# Patient Record
Sex: MALE | Race: WHITE | NOT HISPANIC OR LATINO | Employment: UNEMPLOYED | URBAN - METROPOLITAN AREA
[De-identification: names, ages, dates, MRNs, and addresses within clinical notes are randomized per-mention and may not be internally consistent; named-entity substitution may affect disease eponyms.]

---

## 2021-06-15 ENCOUNTER — TRANSFERRED RECORDS (OUTPATIENT)
Dept: HEALTH INFORMATION MANAGEMENT | Facility: CLINIC | Age: 26
End: 2021-06-15

## 2021-06-21 ENCOUNTER — MEDICAL CORRESPONDENCE (OUTPATIENT)
Dept: HEALTH INFORMATION MANAGEMENT | Facility: CLINIC | Age: 26
End: 2021-06-21

## 2021-06-22 ENCOUNTER — TRANSCRIBE ORDERS (OUTPATIENT)
Dept: OTHER | Age: 26
End: 2021-06-22

## 2021-06-22 DIAGNOSIS — G35 MS (MULTIPLE SCLEROSIS) (H): Primary | ICD-10-CM

## 2021-07-08 NOTE — TELEPHONE ENCOUNTER
RECORDS RECEIVED FROM: External   Date of Appt: 7/29/21   NOTES (FOR ALL VISITS) STATUS DETAILS   OFFICE NOTE from referring provider Care Everywhere Dr Antony Bianchi @ [facility]:  6/15/21  5/18/21  2/18/21  8/14/20  2/14/20  2/14/19   OFFICE NOTE from other specialist Care Everywhere Dr Mary Kay Gonsalez @ [facility]:  3/2/21  12/29/20  3/30/20  11/18/19    Melvin El @ [facility]:  9/9/20  7/2/19  3/25/19    Neuropsych Eval @ [facility]:  1/6/20 11/4/19   DISCHARGE SUMMARY from hospital N/A    DISCHARGE REPORT from the ER N/A    OPERATIVE REPORT N/A    MEDICATION LIST Care Everywhere    IMAGING  (FOR ALL VISITS)     EMG N/A    EEG N/A    LUMBAR PUNCTURE N/A    PATTI SCAN N/A    ULTRASOUND (CAROTID BILAT) *VASCULAR* N/A    MRI (HEAD, NECK, SPINE) Received [facility]:  MRI Lumbar Spine 3/9/21  MRI Cervical Spine 11/9/20  MRI Thoracic Spine 11/9/20  MRI Brain 11/9/20   CT (HEAD, NECK, SPINE) Received [facility]:  CT Head 10/22/20      Action 7/8/21 MV 4.41pm   Action Taken Imaging request faxed to [facility] for:  MRI Lumbar Spine 3/9/21  MRI Cervical Spine 11/9/20  MRI Thoracic Spine 11/9/20  MRI Brain 11/9/20  CT Head 10/22/20     Action 7/19/21 MV 10.05am   Action Taken 2nd request faxed to [facility]     Action 7/26/21 MV 7.53am   Action Taken Images resolved in PACS

## 2021-07-29 ENCOUNTER — PRE VISIT (OUTPATIENT)
Dept: NEUROLOGY | Facility: CLINIC | Age: 26
End: 2021-07-29

## 2021-08-22 ENCOUNTER — HEALTH MAINTENANCE LETTER (OUTPATIENT)
Age: 26
End: 2021-08-22

## 2021-10-17 ENCOUNTER — HEALTH MAINTENANCE LETTER (OUTPATIENT)
Age: 26
End: 2021-10-17

## 2021-12-12 ENCOUNTER — HEALTH MAINTENANCE LETTER (OUTPATIENT)
Age: 26
End: 2021-12-12

## 2021-12-23 ENCOUNTER — TELEPHONE (OUTPATIENT)
Dept: NEUROLOGY | Facility: CLINIC | Age: 26
End: 2021-12-23

## 2021-12-23 ENCOUNTER — LAB (OUTPATIENT)
Dept: LAB | Facility: CLINIC | Age: 26
End: 2021-12-23
Payer: MEDICARE

## 2021-12-23 ENCOUNTER — OFFICE VISIT (OUTPATIENT)
Dept: NEUROLOGY | Facility: CLINIC | Age: 26
End: 2021-12-23
Attending: PSYCHIATRY & NEUROLOGY
Payer: MEDICARE

## 2021-12-23 VITALS
SYSTOLIC BLOOD PRESSURE: 117 MMHG | DIASTOLIC BLOOD PRESSURE: 70 MMHG | OXYGEN SATURATION: 96 % | RESPIRATION RATE: 17 BRPM | TEMPERATURE: 99.3 F | HEART RATE: 103 BPM

## 2021-12-23 DIAGNOSIS — G35 MS (MULTIPLE SCLEROSIS) (H): ICD-10-CM

## 2021-12-23 DIAGNOSIS — U07.1 COVID-19: ICD-10-CM

## 2021-12-23 DIAGNOSIS — E55.9 VITAMIN D DEFICIENCY: ICD-10-CM

## 2021-12-23 DIAGNOSIS — G35 MS (MULTIPLE SCLEROSIS) (H): Primary | ICD-10-CM

## 2021-12-23 DIAGNOSIS — R50.9 FEVER, UNSPECIFIED FEVER CAUSE: ICD-10-CM

## 2021-12-23 PROBLEM — M21.622 TAILOR'S BUNION OF LEFT FOOT: Status: ACTIVE | Noted: 2020-06-30

## 2021-12-23 PROBLEM — M79.672 LEFT FOOT PAIN: Status: ACTIVE | Noted: 2020-06-30

## 2021-12-23 PROBLEM — G47.00 INSOMNIA: Status: ACTIVE | Noted: 2019-01-24

## 2021-12-23 PROBLEM — E66.9 OBESITY WITH BODY MASS INDEX OF 30.0-39.9: Status: ACTIVE | Noted: 2019-06-17

## 2021-12-23 PROBLEM — F41.9 ANXIETY: Status: ACTIVE | Noted: 2019-01-24

## 2021-12-23 PROBLEM — R45.89 SUICIDAL BEHAVIOR: Status: ACTIVE | Noted: 2020-10-23

## 2021-12-23 PROBLEM — F31.60 BIPOLAR 1 DISORDER, MIXED (H): Status: ACTIVE | Noted: 2020-10-23

## 2021-12-23 LAB
ALBUMIN SERPL-MCNC: 4.2 G/DL (ref 3.4–5)
ALP SERPL-CCNC: 61 U/L (ref 40–150)
ALT SERPL W P-5'-P-CCNC: 19 U/L (ref 0–70)
AST SERPL W P-5'-P-CCNC: 20 U/L (ref 0–45)
BASOPHILS # BLD AUTO: 0 10E3/UL (ref 0–0.2)
BASOPHILS NFR BLD AUTO: 0 %
BILIRUB DIRECT SERPL-MCNC: 0.2 MG/DL (ref 0–0.2)
BILIRUB SERPL-MCNC: 0.4 MG/DL (ref 0.2–1.3)
DEPRECATED CALCIDIOL+CALCIFEROL SERPL-MC: 10 UG/L (ref 20–75)
EOSINOPHIL # BLD AUTO: 0 10E3/UL (ref 0–0.7)
EOSINOPHIL NFR BLD AUTO: 0 %
ERYTHROCYTE [DISTWIDTH] IN BLOOD BY AUTOMATED COUNT: 11.6 % (ref 10–15)
HCT VFR BLD AUTO: 46.3 % (ref 40–53)
HGB BLD-MCNC: 15.8 G/DL (ref 13.3–17.7)
IGG SERPL-MCNC: 816 MG/DL (ref 610–1616)
IMM GRANULOCYTES # BLD: 0 10E3/UL
IMM GRANULOCYTES NFR BLD: 0 %
LYMPHOCYTES # BLD AUTO: 1 10E3/UL (ref 0.8–5.3)
LYMPHOCYTES NFR BLD AUTO: 24 %
MCH RBC QN AUTO: 29.4 PG (ref 26.5–33)
MCHC RBC AUTO-ENTMCNC: 34.1 G/DL (ref 31.5–36.5)
MCV RBC AUTO: 86 FL (ref 78–100)
MONOCYTES # BLD AUTO: 0.7 10E3/UL (ref 0–1.3)
MONOCYTES NFR BLD AUTO: 16 %
NEUTROPHILS # BLD AUTO: 2.5 10E3/UL (ref 1.6–8.3)
NEUTROPHILS NFR BLD AUTO: 60 %
NRBC # BLD AUTO: 0 10E3/UL
NRBC BLD AUTO-RTO: 0 /100
PLATELET # BLD AUTO: 133 10E3/UL (ref 150–450)
PROT SERPL-MCNC: 7.3 G/DL (ref 6.8–8.8)
RBC # BLD AUTO: 5.37 10E6/UL (ref 4.4–5.9)
SARS-COV-2 RNA RESP QL NAA+PROBE: POSITIVE
WBC # BLD AUTO: 4.2 10E3/UL (ref 4–11)

## 2021-12-23 PROCEDURE — G0463 HOSPITAL OUTPT CLINIC VISIT: HCPCS

## 2021-12-23 PROCEDURE — 80076 HEPATIC FUNCTION PANEL: CPT | Performed by: PATHOLOGY

## 2021-12-23 PROCEDURE — 99204 OFFICE O/P NEW MOD 45 MIN: CPT | Performed by: PSYCHIATRY & NEUROLOGY

## 2021-12-23 PROCEDURE — 85025 COMPLETE CBC W/AUTO DIFF WBC: CPT | Performed by: PATHOLOGY

## 2021-12-23 PROCEDURE — 82784 ASSAY IGA/IGD/IGG/IGM EACH: CPT | Performed by: PSYCHIATRY & NEUROLOGY

## 2021-12-23 PROCEDURE — 36415 COLL VENOUS BLD VENIPUNCTURE: CPT | Performed by: PATHOLOGY

## 2021-12-23 PROCEDURE — U0003 INFECTIOUS AGENT DETECTION BY NUCLEIC ACID (DNA OR RNA); SEVERE ACUTE RESPIRATORY SYNDROME CORONAVIRUS 2 (SARS-COV-2) (CORONAVIRUS DISEASE [COVID-19]), AMPLIFIED PROBE TECHNIQUE, MAKING USE OF HIGH THROUGHPUT TECHNOLOGIES AS DESCRIBED BY CMS-2020-01-R: HCPCS | Performed by: PSYCHIATRY & NEUROLOGY

## 2021-12-23 PROCEDURE — 82306 VITAMIN D 25 HYDROXY: CPT | Performed by: PSYCHIATRY & NEUROLOGY

## 2021-12-23 RX ORDER — MEPERIDINE HYDROCHLORIDE 25 MG/ML
25 INJECTION INTRAMUSCULAR; INTRAVENOUS; SUBCUTANEOUS EVERY 30 MIN PRN
Status: CANCELLED | OUTPATIENT
Start: 2021-12-23

## 2021-12-23 RX ORDER — DIPHENHYDRAMINE HCL 25 MG
50 CAPSULE ORAL ONCE
Status: CANCELLED | OUTPATIENT
Start: 2021-12-23

## 2021-12-23 RX ORDER — METHYLPREDNISOLONE SODIUM SUCCINATE 125 MG/2ML
125 INJECTION, POWDER, LYOPHILIZED, FOR SOLUTION INTRAMUSCULAR; INTRAVENOUS
Status: CANCELLED
Start: 2021-12-23

## 2021-12-23 RX ORDER — EPINEPHRINE 1 MG/ML
0.3 INJECTION, SOLUTION, CONCENTRATE INTRAVENOUS EVERY 5 MIN PRN
Status: CANCELLED | OUTPATIENT
Start: 2021-12-23

## 2021-12-23 RX ORDER — DIPHENHYDRAMINE HYDROCHLORIDE 50 MG/ML
50 INJECTION INTRAMUSCULAR; INTRAVENOUS
Status: CANCELLED
Start: 2021-12-23

## 2021-12-23 RX ORDER — METHYLPREDNISOLONE SODIUM SUCCINATE 125 MG/2ML
125 INJECTION, POWDER, LYOPHILIZED, FOR SOLUTION INTRAMUSCULAR; INTRAVENOUS ONCE
Status: CANCELLED | OUTPATIENT
Start: 2021-12-23

## 2021-12-23 RX ORDER — HEPARIN SODIUM,PORCINE 10 UNIT/ML
5 VIAL (ML) INTRAVENOUS
Status: CANCELLED | OUTPATIENT
Start: 2021-12-23

## 2021-12-23 RX ORDER — HEPARIN SODIUM (PORCINE) LOCK FLUSH IV SOLN 100 UNIT/ML 100 UNIT/ML
5 SOLUTION INTRAVENOUS
Status: CANCELLED | OUTPATIENT
Start: 2021-12-23

## 2021-12-23 RX ORDER — NALOXONE HYDROCHLORIDE 0.4 MG/ML
0.2 INJECTION, SOLUTION INTRAMUSCULAR; INTRAVENOUS; SUBCUTANEOUS
Status: CANCELLED | OUTPATIENT
Start: 2021-12-23

## 2021-12-23 RX ORDER — DEXTROAMPHETAMINE SACCHARATE, AMPHETAMINE ASPARTATE, DEXTROAMPHETAMINE SULFATE AND AMPHETAMINE SULFATE 2.5; 2.5; 2.5; 2.5 MG/1; MG/1; MG/1; MG/1
10 TABLET ORAL 2 TIMES DAILY
COMMUNITY

## 2021-12-23 RX ORDER — ALBUTEROL SULFATE 90 UG/1
1-2 AEROSOL, METERED RESPIRATORY (INHALATION)
Status: CANCELLED
Start: 2021-12-23

## 2021-12-23 RX ORDER — ALBUTEROL SULFATE 0.83 MG/ML
2.5 SOLUTION RESPIRATORY (INHALATION)
Status: CANCELLED | OUTPATIENT
Start: 2021-12-23

## 2021-12-23 RX ORDER — ACETAMINOPHEN 325 MG/1
650 TABLET ORAL ONCE
Status: CANCELLED | OUTPATIENT
Start: 2021-12-23

## 2021-12-23 NOTE — PATIENT INSTRUCTIONS
1. Blood tests and test for COVID-19 today    2. We will arrange your next Ocrevus infusion in February, target date 2/18/2022    3. Return to clinic in 6 months with MRI scans of the brain and cervical spine prior to visit

## 2021-12-23 NOTE — TELEPHONE ENCOUNTER
Guzman has an office visit today and it was determined he would continue Ocrevus infusions.  Would prefer to infuse at [facility].  Next infusion due 2/18/22. Therapy plan routed to Dr. Gross for review and signature.    Akanksha Gupta RN

## 2021-12-23 NOTE — PROGRESS NOTES
"Date of service: December 23, 2021    Referral source: Self-referred    Chief complaint: Multiple sclerosis    History of the Present Illness: Mr. Guzman Davidson is a 26-year-old man who is evaluated in the Multiple Sclerosis Clinic today at his request for the purpose of establishing care here for ongoing treatment of his diagnosis of multiple sclerosis.  I should note that the patient is registered in this system under the name, Angel HALLLupis  Juanitawing, but indicated to me that his name has been \"legally changed\" to Guzman Davidson, and the latter is the name that he prefers.    The patient's history of symptoms of demyelinating disease dates back to age 15.  He was living in [place] at that time, and none of the records from that initial evaluation are available to me.  In any event, he relates that he developed double vision and gait impairment that he likens to a \"walking like (he) was drunk.\" He relates that within a couple of days of onset of his symptoms, he also developed a severe vertiginous sensation that was bothersome enough that he was vomiting.      Investigations apparently led to a diagnosis of multiple sclerosis, and he was placed on disease-modifying therapy with glatiramer acetate.  He remained on that therapy intermittently over the next several years.  He relates that interruptions in therapy were related to difficulties with insurance and various moves.  In any event, he did have ongoing clinical relapses on this treatment and then was transitioned to ocrelizumab in 2018.  He tells me that since he started these infusions, he has not had any new episodic changes in vision, balance, strength or sensation suggestive of new relapse of multiple sclerosis.  He is tolerating the infusions without difficulty and underwent the last treatment on 08/18/2021.    Regarding neuroimaging, he last had MRIs about one year ago, as described further below.  The official radiology report from the most recent brain scan " suggested that there was a new non-enhancing lesion in the right frontal lobe, but his treating neurologist was not convinced that this was a genuine finding.    The patient recently moved to the [facility] from [facility], and is here today to establish care with our clinic.    Of note, the patient reported fever and active respiratory symptoms on presentation to our clinic.    Past Medical History:    1.  Attention deficit hyperactivity disorder.  2.  Posttraumatic stress disorder, per patient report.  3.  Borderline personality disorder, per patient report.    I also noted that he was diagnosed with bipolar I disorder in the past, but the patient indicates to me that his current psychiatrist does not agree with that diagnosis.    Medications:  1.  Adderall 20 mg p.o. b.i.d.  2.  Vitamin D  3.  [place] medical cannabis products.  4.  Ocrelizumab 600 mg IV every 6 months.    Family History: Positive for a diagnosis of multiple sclerosis in the patient's mother.  His maternal grandfather also had this condition, apparently.    Social History:  The patient relates that he smoked cigarettes in the past, but has not smoked regularly for a number of years.  He quit smoking tobacco altogether about 6 months ago.  He is not currently employed, but indicates that he is pursuing vocational rehabilitation at this time.    PHYSICAL EXAMINATION:    VITAL SIGNS:  Blood pressure 117/70; pulse 103; respiratory rate 17 breaths per minute; temperature 99.3 degrees; oxygen saturation 96%.  GENERAL:  Well-nourished man who presents to the evaluation alone, awake and alert and in no acute distress.    Remainder of physical examination was deferred to avoid close physical contact due to suspicion for COVID-19 infection.    Investigations: I reviewed images from MRI scans of the brain and spinal cord previously performed at an outside facility on 11/09/2020, and the following is my independent  interpretation of those images.  There is no abnormal enhancement with gadolinium contrast throughout the brain and cord parenchyma.  MRI scan of the brain demonstrates multiple periventricular and juxtacortical foci of T2 hyperintensity in the white matter of the cerebral hemispheres bilaterally in a pattern that is highly consistent with demyelinating disease of the type seen in multiple sclerosis.  As noted above, the interpreting radiologist made note of a possible new lesion in the right frontal lobe, but I do not have access to the comparison MRI images and cannot comment independently on that interpretation.  MRI scans of the brain and cervical spine show multiple small short segment foci of T2 hyperintensity in the cord parenchyma, also compatible with demyelinating plaques.    Assessment/plan:    1.  Multiple sclerosis  Per report, the patient has been clinically stable on treatment with ocrelizumab over the past 3 years.  His imaging is entirely consistent with the clinical diagnosis of multiple sclerosis rendered elsewhere, and I do not have any doubt that this diagnosis is accurate.    Today, I will obtain laboratory studies for routine monitoring of this medication to include complete blood counts with differential, hepatic panel and total antibody (IgG) level.  I will also check a vitamin D level and advise him on supplementation of vitamin D as needed to maintain his level within the goal range of 60-80 mcg per liter.    We will submit orders for his next ocrelizumab infusion, which is due to be performed with a target date of 02/18/2022.  He would like to attempt the rapid infusion protocol and this would be fine with me given that he reportedly tolerated previous ocrelizumab infusions without difficulty.    I am going to see him back in 6 months with MRI scans of the brain and cervical spine to be performed prior to that appointment to monitor the radiologic stability of his condition.    2.  Upper  respiratory infection  The patient will obtain a nasal swab for COVID-19 PCR today, and we will contact him with those results when available.    I spent a total of 47 minutes on patient care activities related to this encounter on the date of service, including time spent in reviewing internal and external medical records, independent review of neuroimaging, obtaining history from and counseling the patient, and in documentation in the electronic medical record.    ADDENDUM (2021): Patient tested positive for COVID-19. He has not read the [employer] message sent to him regarding this diagnosis, and will attempt to have our nursing staff contact him by telephone.    Also noted is severely low vitamin D level at 10 mcg/L, and the patient will be advised to add 5000 units of vitamin D daily in addition to any current supplements that he may be taking.    Brandon Gross MD        D: 2021   T: 2021   MT: ANAYELI    Name:     JANAE FLORES  MRN:      [id number]        Account:      [id number]   :      1995           Service Date: 2021       Document: [id number]

## 2021-12-23 NOTE — NURSING NOTE
Chief Complaint   Patient presents with     MS     [facility] NEW MULTIPLE SCLEROSIS        Rizwan Fuller, EMT

## 2021-12-23 NOTE — TELEPHONE ENCOUNTER
Prior Authorization Infusion/Clinic Administered Request    Location: [facility]  Diagnosis and ICD:Multiple Sclerosis, G35  Drug/Therapy: Ocrevus 600 mg    Previously Tried and Failed Therapies: Patient stable on Ocrevus; new to our clinic but has been on Ocrevus since 2018    Date of provider note with supporting information: 12/23/21    Urgency (When is the patient scheduled?):     Would you like to include any research articles?         If yes please call [phone] for further instructions about sending that information

## 2021-12-23 NOTE — LETTER
"12/23/2021      RE: Angel Baker  [address]  [address]  [facility] [address]     Referral source: Self-referred    Chief complaint: Multiple sclerosis    History of the Present Illness: Mr. Guzman Davidson is a 26-year-old man who is evaluated in the Multiple Sclerosis Clinic today at his request for the purpose of establishing care here for ongoing treatment of his diagnosis of multiple sclerosis.  I should note that the patient is registered in this system under the name, Angel Baker, but indicated to me that his name has been \"legally changed\" to Guzman Davidson, and the latter is the name that he prefers.    The patient's history of symptoms of demyelinating disease dates back to age 15.  He was living in [place] at that time, and none of the records from that initial evaluation are available to me.  In any event, he relates that he developed double vision and gait impairment that he likens to a \"walking like (he) was drunk.\" He relates that within a couple of days of onset of his symptoms, he also developed a severe vertiginous sensation that was bothersome enough that he was vomiting.      Investigations apparently led to a diagnosis of multiple sclerosis, and he was placed on disease-modifying therapy with glatiramer acetate.  He remained on that therapy intermittently over the next several years.  He relates that interruptions in therapy were related to difficulties with insurance and various moves.  In any event, he did have ongoing clinical relapses on this treatment and then was transitioned to ocrelizumab in 2018.  He tells me that since he started these infusions, he has not had any new episodic changes in vision, balance, strength or sensation suggestive of new relapse of multiple sclerosis.  He is tolerating the infusions without difficulty and underwent the last treatment on 08/18/2021.    Regarding neuroimaging, he last had MRIs about one year ago, as described further below.  The " official radiology report from the most recent brain scan suggested that there was a new non-enhancing lesion in the right frontal lobe, but his treating neurologist was not convinced that this was a genuine finding.    The patient recently moved to the [facility] from [facility], and is here today to establish care with our clinic.    Of note, the patient reported fever and active respiratory symptoms on presentation to our clinic.      Past Medical History:    1.  Attention deficit hyperactivity disorder.  2.  Posttraumatic stress disorder, per patient report.  3.  Borderline personality disorder, per patient report.    I also noted that he was diagnosed with bipolar I disorder in the past, but the patient indicates to me that his current psychiatrist does not agree with that diagnosis.    Medications:  1.  Adderall 20 mg p.o. b.i.d.  2.  Vitamin D  3.  [place] medical cannabis products.  4.  Ocrelizumab 600 mg IV every 6 months.    Family History: Positive for a diagnosis of multiple sclerosis in the patient's mother.  His maternal grandfather also had this condition, apparently.    Social History:  The patient relates that he smoked cigarettes in the past, but has not smoked regularly for a number of years.  He quit smoking tobacco altogether about 6 months ago.  He is not currently employed, but indicates that he is pursuing vocational rehabilitation at this time.    PHYSICAL EXAMINATION:    VITAL SIGNS:  Blood pressure 117/70; pulse 103; respiratory rate 17 breaths per minute; temperature 99.3 degrees; oxygen saturation 96%.  GENERAL:  Well-nourished man who presents to the evaluation alone, awake and alert and in no acute distress.    Remainder of physical examination was deferred to avoid close physical contact due to suspicion for COVID-19 infection.    Investigations: I reviewed images from MRI scans of the brain and spinal cord previously performed at an outside facility  on 11/09/2020, and the following is my independent interpretation of those images.  There is no abnormal enhancement with gadolinium contrast throughout the brain and cord parenchyma.  MRI scan of the brain demonstrates multiple periventricular and juxtacortical foci of T2 hyperintensity in the white matter of the cerebral hemispheres bilaterally in a pattern that is highly consistent with demyelinating disease of the type seen in multiple sclerosis.  As noted above, the interpreting radiologist made note of a possible new lesion in the right frontal lobe, but I do not have access to the comparison MRI images and cannot comment independently on that interpretation.  MRI scans of the brain and cervical spine show multiple small short segment foci of T2 hyperintensity in the cord parenchyma, also compatible with demyelinating plaques.    Assessment/plan:    1.  Multiple sclerosis  Per report, the patient has been clinically stable on treatment with ocrelizumab over the past 3 years.  His imaging is entirely consistent with the clinical diagnosis of multiple sclerosis rendered elsewhere, and I do not have any doubt that this diagnosis is accurate.    Today, I will obtain laboratory studies for routine monitoring of this medication to include complete blood counts with differential, hepatic panel and total antibody (IgG) level.  I will also check a vitamin D level and advise him on supplementation of vitamin D as needed to maintain his level within the goal range of 60-80 mcg per liter.    We will submit orders for his next ocrelizumab infusion, which is due to be performed with a target date of 02/18/2022.  He would like to attempt the rapid infusion protocol and this would be fine with me given that he reportedly tolerated previous ocrelizumab infusions without difficulty.    I am going to see him back in 6 months with MRI scans of the brain and cervical spine to be performed prior to that appointment to monitor the  radiologic stability of his condition.    2.  Upper respiratory infection  The patient will obtain a nasal swab for COVID-19 PCR today, and we will contact him with those results when available.    I spent a total of 47 minutes on patient care activities related to this encounter on the date of service, including time spent in reviewing internal and external medical records, independent review of neuroimaging, obtaining history from and counseling the patient, and in documentation in the electronic medical record.    ADDENDUM (12-): Patient tested positive for COVID-19. He has not read the [employer] message sent to him regarding this diagnosis, and will attempt to have our nursing staff contact him by telephone.    Also noted is severely low vitamin D level at 10 mcg/L, and the patient will be advised to add 5000 units of vitamin D daily in addition to any current supplements that he may be taking.    Brandon Gross MD   of Neurology  [facility]    Cc:  Patient

## 2021-12-26 ENCOUNTER — MYC MEDICAL ADVICE (OUTPATIENT)
Dept: NEUROLOGY | Facility: CLINIC | Age: 26
End: 2021-12-26
Payer: MEDICARE

## 2021-12-28 ENCOUNTER — TELEPHONE (OUTPATIENT)
Dept: NEUROLOGY | Facility: CLINIC | Age: 26
End: 2021-12-28
Payer: MEDICARE

## 2021-12-28 DIAGNOSIS — G35 MS (MULTIPLE SCLEROSIS) (H): Primary | ICD-10-CM

## 2021-12-28 DIAGNOSIS — E55.9 VITAMIN D DEFICIENCY: ICD-10-CM

## 2021-12-28 NOTE — TELEPHONE ENCOUNTER
Spoke with pt and informed him of his lab results, with instructions from Dr Gross regarding Covid isolation and vitamin D supplementation. Pt was already aware of Covid positive status and has been isolating. Pt requesting vitamin D prescription be sent to [facility] on [address] in [place]. Informed Dr Gross of pt's request.    Angie Blunt RN

## 2022-01-11 NOTE — TELEPHONE ENCOUNTER
Per infusion finance team, no PA required. Sent Guzman a my chart message that he can schedule his infusion at [facility] at this time; target date of 2/18.    Akanksha Gupta RN

## 2022-01-14 DIAGNOSIS — Z11.59 ENCOUNTER FOR SCREENING FOR OTHER VIRAL DISEASES: Primary | ICD-10-CM

## 2022-01-25 ENCOUNTER — DOCUMENTATION ONLY (OUTPATIENT)
Dept: OTHER | Facility: CLINIC | Age: 27
End: 2022-01-25

## 2022-02-05 ENCOUNTER — HOSPITAL ENCOUNTER (INPATIENT)
Age: 27
End: 2022-02-05
Payer: MEDICARE

## 2022-02-05 ENCOUNTER — HOSPITAL ENCOUNTER (EMERGENCY)
Facility: CLINIC | Age: 27
Discharge: HOME | End: 2022-02-06
Attending: EMERGENCY MEDICINE | Admitting: EMERGENCY MEDICINE
Payer: MEDICARE

## 2022-02-05 ENCOUNTER — TELEPHONE (OUTPATIENT)
Dept: BEHAVIORAL HEALTH | Facility: CLINIC | Age: 27
End: 2022-02-05

## 2022-02-05 VITALS
RESPIRATION RATE: 16 BRPM | OXYGEN SATURATION: 97 % | TEMPERATURE: 98.2 F | DIASTOLIC BLOOD PRESSURE: 67 MMHG | SYSTOLIC BLOOD PRESSURE: 119 MMHG | WEIGHT: 200 LBS | HEART RATE: 88 BPM

## 2022-02-05 DIAGNOSIS — R45.1 AGITATION REQUIRING SEDATION PROTOCOL: ICD-10-CM

## 2022-02-05 DIAGNOSIS — F31.9 BIPOLAR AFFECTIVE DISORDER, REMISSION STATUS UNSPECIFIED (H): ICD-10-CM

## 2022-02-05 DIAGNOSIS — F63.81 INTERMITTENT EXPLOSIVE DISORDER IN ADULT: ICD-10-CM

## 2022-02-05 DIAGNOSIS — F30.9 MANIA (H): ICD-10-CM

## 2022-02-05 DIAGNOSIS — Z20.822 COVID-19 RULED OUT BY LABORATORY TESTING: ICD-10-CM

## 2022-02-05 LAB
AMPHETAMINES UR QL SCN: ABNORMAL
BARBITURATES UR QL: ABNORMAL
BENZODIAZ UR QL: ABNORMAL
CANNABINOIDS UR QL SCN: ABNORMAL
COCAINE UR QL: ABNORMAL
OPIATES UR QL SCN: ABNORMAL
SARS-COV-2 RNA RESP QL NAA+PROBE: POSITIVE

## 2022-02-05 PROCEDURE — 99291 CRITICAL CARE FIRST HOUR: CPT | Mod: 25 | Performed by: EMERGENCY MEDICINE

## 2022-02-05 PROCEDURE — 99285 EMERGENCY DEPT VISIT HI MDM: CPT | Performed by: EMERGENCY MEDICINE

## 2022-02-05 PROCEDURE — 90791 PSYCH DIAGNOSTIC EVALUATION: CPT

## 2022-02-05 PROCEDURE — U0005 INFEC AGEN DETEC AMPLI PROBE: HCPCS | Performed by: EMERGENCY MEDICINE

## 2022-02-05 PROCEDURE — 99285 EMERGENCY DEPT VISIT HI MDM: CPT | Mod: 25 | Performed by: EMERGENCY MEDICINE

## 2022-02-05 PROCEDURE — 250N000013 HC RX MED GY IP 250 OP 250 PS 637: Performed by: EMERGENCY MEDICINE

## 2022-02-05 PROCEDURE — 80307 DRUG TEST PRSMV CHEM ANLYZR: CPT | Performed by: EMERGENCY MEDICINE

## 2022-02-05 PROCEDURE — C9803 HOPD COVID-19 SPEC COLLECT: HCPCS | Performed by: EMERGENCY MEDICINE

## 2022-02-05 RX ORDER — LORAZEPAM 0.5 MG/1
1 TABLET ORAL ONCE
Status: COMPLETED | OUTPATIENT
Start: 2022-02-05 | End: 2022-02-05

## 2022-02-05 RX ORDER — GABAPENTIN 300 MG/1
300 CAPSULE ORAL 3 TIMES DAILY
Status: DISCONTINUED | OUTPATIENT
Start: 2022-02-05 | End: 2022-02-06 | Stop reason: HOSPADM

## 2022-02-05 RX ORDER — DEXTROAMPHETAMINE SACCHARATE, AMPHETAMINE ASPARTATE, DEXTROAMPHETAMINE SULFATE AND AMPHETAMINE SULFATE 1.25; 1.25; 1.25; 1.25 MG/1; MG/1; MG/1; MG/1
10 TABLET ORAL 2 TIMES DAILY
Status: DISCONTINUED | OUTPATIENT
Start: 2022-02-05 | End: 2022-02-05

## 2022-02-05 RX ORDER — OLANZAPINE 5 MG/1
10 TABLET, ORALLY DISINTEGRATING ORAL ONCE
Status: COMPLETED | OUTPATIENT
Start: 2022-02-05 | End: 2022-02-05

## 2022-02-05 RX ORDER — DIAZEPAM 5 MG
5 TABLET ORAL 2 TIMES DAILY PRN
Status: DISCONTINUED | OUTPATIENT
Start: 2022-02-05 | End: 2022-02-06 | Stop reason: HOSPADM

## 2022-02-05 RX ORDER — VITAMIN B COMPLEX
125 TABLET ORAL DAILY
Status: DISCONTINUED | OUTPATIENT
Start: 2022-02-06 | End: 2022-02-06 | Stop reason: HOSPADM

## 2022-02-05 RX ORDER — DEXTROAMPHETAMINE SACCHARATE, AMPHETAMINE ASPARTATE, DEXTROAMPHETAMINE SULFATE AND AMPHETAMINE SULFATE 2.5; 2.5; 2.5; 2.5 MG/1; MG/1; MG/1; MG/1
10 TABLET ORAL 2 TIMES DAILY
Status: DISCONTINUED | OUTPATIENT
Start: 2022-02-06 | End: 2022-02-06 | Stop reason: HOSPADM

## 2022-02-05 RX ORDER — DIAZEPAM 5 MG
5 TABLET ORAL 2 TIMES DAILY PRN
COMMUNITY

## 2022-02-05 RX ADMIN — LORAZEPAM 1 MG: 0.5 TABLET ORAL at 14:32

## 2022-02-05 RX ADMIN — OLANZAPINE 10 MG: 5 TABLET, ORALLY DISINTEGRATING ORAL at 14:32

## 2022-02-05 ASSESSMENT — ENCOUNTER SYMPTOMS
ARTHRALGIAS: 0
DYSPHORIC MOOD: 1
VOMITING: 0
CHILLS: 0
POLYDIPSIA: 0
BRUISES/BLEEDS EASILY: 0
ADENOPATHY: 0
COLOR CHANGE: 0
EYE REDNESS: 0
HYPERACTIVE: 1
NECK PAIN: 0
ABDOMINAL PAIN: 0
DIFFICULTY URINATING: 0
SHORTNESS OF BREATH: 0
DECREASED CONCENTRATION: 1
HALLUCINATIONS: 0
CONFUSION: 0
NERVOUS/ANXIOUS: 1
FEVER: 0
NAUSEA: 0
HEADACHES: 0
NECK STIFFNESS: 0
SLEEP DISTURBANCE: 1

## 2022-02-05 NOTE — TELEPHONE ENCOUNTER
Pt presents in [facility] [place].  B: Pt brought self to Ed in fear of him killing someone. Pt states increase in rage episodes of screaming, punching holes in the wall, property damage.  Pt cannot control impuses and very small things will make him go off.  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 Pt appears very intense , intense stares at staff, racing thoughts, pacing. Pt prescribed Medicinal Mj, utox pos MJ only.  Pt escalating in ed, pounding head, posturing  A: Manic behaviors. Cooperating but impulsive outbiurstd in ED Wants help, vol.  R: sanamarin/ 55c  Patient cleared and ready for behavioral bed placement: Yes   Pt Covid positive but was also positive in December, consulting with IP for clearance

## 2022-02-05 NOTE — ED PROVIDER NOTES
"    [place] EMERGENCY DEPARTMENT ([facility])  22  History     Chief Complaint   Patient presents with     Psychiatric Evaluation     The history is provided by the patient, a significant other and medical records.     Guzman Davidson is a 26 year old male with a past medical history significant for MS, bipolar 1 disorder, and anxiety who presents to the Emergency Department due to worsening anger and agitation. Patient reports that he has been having \"serious rage issues\". He states he feels people are against him, which gives him \"serious anxiety\". He states that he has been having a more \"physical response\" to his rage. Patient reports that he will often \"throw tantrums\" when things do not go his way, or things do not happen \"quick enough\" for him. Patient reports that he has been doing this for a while, but more so in the last 1 week. He states that he has \"isolated this down to a childhood issue\" that he cannot stop fixating over. Patient reports that he is prescribed Valium, and took 20 mg of this today as well as medical cannabis. Additionally, patient also reports history of PTSD. Patient denies any active suicidal ideation. He denies any use of street drugs or alcohol use. Patient also reports history of MS and deals with chronic pain which he and his significant other feel as a contributor to his anger.    Past Medical History  No past medical history on file.  No past surgical history on file.  amphetamine-dextroamphetamine (ADDERALL) 10 MG tablet  Cholecalciferol (VITAMIN D-3) 125 MCG (5000 UT) TABS  diazepam (VALIUM) 5 MG tablet  gabapentin (NEURONTIN) 300 MG capsule  NONFORMULARY      Allergies   Allergen Reactions     Cocoa Butter Other (See Comments)     Other reaction(s): Other (See Comments)  Skin irritation with older or , fresh is ok   Skin irritation       Corticosteroids Other (See Comments)     Other reaction(s): Other (See Comments)  Steroids \"homocidal rage.\"  Steroids " "\"homicidal rage.\"  States 'rage'       Lactase Diarrhea     Bloating, gas     Sulfa Drugs Diarrhea     Bloating, gas  Bloating, gas     Family History  No family history on file.  Social History   Social History     Tobacco Use     Smoking status: Not on file     Smokeless tobacco: Not on file   Substance Use Topics     Alcohol use: Not on file     Drug use: Not on file      Past medical history, past surgical history, medications, allergies, family history, and social history were reviewed with the patient. No additional pertinent items.     I have reviewed the Medications, Allergies, Past Medical and Surgical History, and Social History in the Epic system.    Review of Systems   Constitutional: Negative for chills and fever.   HENT: Negative for congestion.    Eyes: Negative for redness.   Respiratory: Negative for shortness of breath.    Cardiovascular: Negative for chest pain.   Gastrointestinal: Negative for abdominal pain, nausea and vomiting.   Endocrine: Negative for polydipsia and polyuria.   Genitourinary: Negative for difficulty urinating.   Musculoskeletal: Negative for arthralgias, neck pain and neck stiffness.   Skin: Negative for color change.   Allergic/Immunologic: Negative for immunocompromised state.   Neurological: Negative for headaches.   Hematological: Negative for adenopathy. Does not bruise/bleed easily.   Psychiatric/Behavioral: Positive for behavioral problems, decreased concentration, dysphoric mood and sleep disturbance. Negative for confusion, hallucinations, self-injury and suicidal ideas. The patient is nervous/anxious and is hyperactive.    All other systems reviewed and are negative.    A complete review of systems was performed with pertinent positives and negatives noted in the HPI, and all other systems negative.    Physical Exam   BP: (!) 200/161  Pulse: 83  Temp: 97.8  F (36.6  C)  Resp: 18  Weight: 90.7 kg (200 lb)  SpO2: 99 %      Physical Exam  Vitals and nursing note " reviewed.   Constitutional:       General: He is not in acute distress.     Appearance: Normal appearance. He is obese. He is not ill-appearing, toxic-appearing or diaphoretic.      Comments: Patient is awake, alert, appears his stated age, and reasonably well-groomed.  He is pacing the room upon my entering.   HENT:      Head: Normocephalic and atraumatic.   Eyes:      General: No scleral icterus.     Extraocular Movements: Extraocular movements intact.      Conjunctiva/sclera: Conjunctivae normal.      Pupils: Pupils are equal, round, and reactive to light.   Cardiovascular:      Rate and Rhythm: Normal rate.      Pulses: Normal pulses.      Heart sounds: Normal heart sounds.   Pulmonary:      Effort: Pulmonary effort is normal. No respiratory distress.      Breath sounds: Normal breath sounds.   Abdominal:      General: Abdomen is flat.      Palpations: Abdomen is soft.      Tenderness: There is no abdominal tenderness.   Musculoskeletal:         General: No tenderness. Normal range of motion.      Cervical back: Normal range of motion and neck supple.   Skin:     General: Skin is warm.      Findings: No rash.   Neurological:      General: No focal deficit present.      Mental Status: He is alert and oriented to person, place, and time.      GCS: GCS eye subscore is 4. GCS verbal subscore is 5. GCS motor subscore is 6.      Cranial Nerves: No cranial nerve deficit.      Coordination: Coordination normal.   Psychiatric:      Comments: Patient pacing the room and difficult to calm down.  Unable to sit during the interview.  Pressured speech.  No obvious response to internal stimuli.  No active suicidal or homicidal thoughts.  Tangential thought process.  Manic.         ED Course     At 11:40 AM the patient was seen and examined by Cassidy Bonilla MD in Room ED29.       Procedures      Results for orders placed or performed during the hospital encounter of 02/05/22 (from the past 24 hour(s))   Asymptomatic COVID-19  Virus (Coronavirus) by PCR Nasopharyngeal    Specimen: Nasopharyngeal; Swab   Result Value Ref Range    SARS CoV2 PCR Positive (A) Negative, Testing sent to reference lab. Results will be returned via unsolicited result    Narrative    Testing was performed using the Xpert Xpress SARS-CoV-2 Assay on the  Cepheid Gene-Xpert Instrument Systems. Additional information about  this Emergency Use Authorization (EUA) assay can be found via the Lab  Guide. This test should be ordered for the detection of SARS-CoV-2 in  individuals who meet SARS-CoV-2 clinical and/or epidemiological  criteria. Test performance is unknown in asymptomatic patients. This  test is for in vitro diagnostic use under the FDA EUA for  laboratories certified under CLIA to perform high complexity testing.  This test has not been FDA cleared or approved. A negative result  does not rule out the presence of PCR inhibitors in the specimen or  target RNA in concentration below the limit of detection for the  assay. The possibility of a false negative should be considered if  the patient's recent exposure or clinical presentation suggests  COVID-19. This test was validated by the [facility]. This laboratory is certified under  the Clinical Laboratory Improvement Amendments of 1988 (CLIA-88) as  qualified to perform high complexity laboratory testing.     Urine Drugs of Abuse Screen    Narrative    The following orders were created for panel order Urine Drugs of Abuse Screen.  Procedure                               Abnormality         Status                     ---------                               -----------         ------                     Drug abuse screen 1 urin...[[id number]]  Abnormal            Final result                 Please view results for these tests on the individual orders.   Drug abuse screen 1 urine (ED)   Result Value Ref Range    Amphetamines Urine Screen Negative Screen Negative     Barbiturates Urine Screen Negative Screen Negative    Benzodiazepines Urine Screen Negative Screen Negative    Cannabinoids Urine Screen Positive (A) Screen Negative    Cocaine Urine Screen Negative Screen Negative    Opiates Urine Screen Negative Screen Negative     Medications   gabapentin (NEURONTIN) capsule 300 mg (has no administration in time range)   amphetamine-dextroamphetamine (ADDERALL) per tablet 10 mg (has no administration in time range)   diazepam (VALIUM) tablet 5 mg (has no administration in time range)   cholecalciferol (VITAMIN D3) 125 mcg (5000 units) capsule 125 mcg (has no administration in time range)   OLANZapine zydis (zyPREXA) ODT tab 10 mg (10 mg Oral Given 2/5/22 1432)   LORazepam (ATIVAN) tablet 1 mg (1 mg Oral Given 2/5/22 1432)             Assessments & Plan (with Medical Decision Making)   26-year-old man presenting to the emergency department with mental health complaint.  Differential diagnosis: Acute megha, schizophrenia, schizoaffective disorder, bipolar disorder.    After thorough history and physical exam patient appears to be no acute distress, however, he is pacing the room rapidly and exhibits signs of megha during the exam.  Despite his taking oral Valium prior to arriving I do believe that he needs additional medication to help control his behavior.  He will be given oral Zyprexa. DEC assessment was ordered for mental health evaluation.    Patient was seen and evaluated by DEC . Recommendations were made to admit the patient to inpatient psychiatry unit. I agree with this plan. Patient agrees with this plan as well.    4:40 PM  I spoke to mental health intake; it is unlikely the patient will receive a bed/room today.  He will be transferred to [place] emergency department to board awaiting mental health admission.  Case was discussed with the charge nurse and Dr. Alvarado who agreed to accept the patient.    This part of the medical record was transcribed by Sena  Kunal Medical Scribe, from a dictation done by Cassidy Bonilla MD.    I have reviewed the nursing notes.    I have reviewed the findings, diagnosis, plan and need for follow up with the patient.    New Prescriptions    No medications on file       Final diagnoses:   Leann (H)       IRamiro am serving as a trained medical scribe to document services personally performed by Cassidy Bonilla MD, based on the provider's statements to me.      I, Cassidy Bonilla MD, was physically present and have reviewed and verified the accuracy of this note documented by Ramiro Barba.     Cassidy Bonilla MD  2/5/2022   [facility] EMERGENCY DEPARTMENT     Cassidy Bonilla MD  02/05/22 1640
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
## 2022-02-05 NOTE — ED TRIAGE NOTES
"Seeking evaluation and help with \"Rage issues\". States he has trauma in his past that is creating problems that risk longterm. Cooperative in triage, reports his Rx for valium is not working, endorses depression, denies SI.   "

## 2022-02-06 PROCEDURE — 96372 THER/PROPH/DIAG INJ SC/IM: CPT | Performed by: EMERGENCY MEDICINE

## 2022-02-06 PROCEDURE — 250N000013 HC RX MED GY IP 250 OP 250 PS 637: Performed by: EMERGENCY MEDICINE

## 2022-02-06 PROCEDURE — 250N000011 HC RX IP 250 OP 636: Performed by: EMERGENCY MEDICINE

## 2022-02-06 RX ORDER — OLANZAPINE 10 MG/1
10 TABLET, ORALLY DISINTEGRATING ORAL ONCE
Status: DISCONTINUED | OUTPATIENT
Start: 2022-02-06 | End: 2022-02-06 | Stop reason: HOSPADM

## 2022-02-06 RX ORDER — OLANZAPINE 10 MG/2ML
10 INJECTION, POWDER, FOR SOLUTION INTRAMUSCULAR ONCE
Status: COMPLETED | OUTPATIENT
Start: 2022-02-06 | End: 2022-02-06

## 2022-02-06 RX ORDER — OLANZAPINE 10 MG/2ML
INJECTION, POWDER, FOR SOLUTION INTRAMUSCULAR
Status: DISCONTINUED
Start: 2022-02-06 | End: 2022-02-06 | Stop reason: HOSPADM

## 2022-02-06 RX ADMIN — OLANZAPINE 10 MG: 10 INJECTION, POWDER, FOR SOLUTION INTRAMUSCULAR at 01:34

## 2022-02-06 RX ADMIN — DEXTROAMPHETAMINE SACCHARATE, AMPHETAMINE ASPARTATE, DEXTROAMPHETAMINE SULFATE AND AMPHETAMINE SULFATE 10 MG: 2.5; 2.5; 2.5; 2.5 TABLET ORAL at 09:45

## 2022-02-06 RX ADMIN — GABAPENTIN 300 MG: 300 CAPSULE ORAL at 08:47

## 2022-02-06 RX ADMIN — Medication 125 MCG: at 08:47

## 2022-02-06 NOTE — ED NOTES
Pt awake and wanting to know the plan for today and was told of the plan to talk to the DEC .  Pt upset about being here and states he is fine and that he just wants to go home but agreed to talk to the DEC .

## 2022-02-06 NOTE — ED NOTES
"MD and psych associate in room talking with patient. Patient was agitated about waiting here for hours and that no bed was available yet. Patient was becoming more and more agitated about needing help and that \"YOU are not helping me by keeping me in this room, I am going crazy, and I want to leave\". Patient was offered that if he was calm and cooperative with staff that he may be able to go on his own terms as he did come in voluntarily looking for help. Patient was still getting very agitated and starting clenching his fist towards the MD. MD moved out of the room into safety and verbalized an order for a health officer hold. Code team was present during this time as well as security. RN's were present as well as writer.   "

## 2022-02-06 NOTE — ED NOTES
Pt talking to the DEC  via iPad after being given his PO meds.  Pt deferred the Atteral until closer to 10am.

## 2022-02-06 NOTE — ED NOTES
Patient appears calm and asleep, not in any physical distress., patient was taken out of seclusions at 0245 am

## 2022-02-06 NOTE — ED NOTES
Pt asked for something for anxiety about waiting.  ED provide made aware and was told the Pt was now up for discharge.  Pt cleared to be discharge and given discharge instructions and stated he was fine with not getting something for anxiety.  Pt left without incident but did decline vitals at discharge.

## 2022-02-06 NOTE — ED NOTES
"Patient looked at writer and stated \"I am a prisoner here, admit it\". And then went on to say, \"You did this to me\". And then said again, \"admitt it, just admit it\".   "

## 2022-02-06 NOTE — PROGRESS NOTES
Guzman Davidson is reviewed for [facility] service. Will follow and meet with patient/family/care team as able or requested.     Lisa Hall, [facility], [facility] Extended Care   [phone]

## 2022-02-06 NOTE — ED NOTES
"Patient yelling in room about the bathroom door being locked. \"Why does it have to be such a thing where I have to wait for a bathroom door to be unlocked?? Why is this such a big deal?!\" Patient supported and writer apologized for the wait. Patient went to the bathroom and came out, but continued to yell loudly at staff and went into room. MD was notified of patient escalating and was going to order PO Zyprexa if patient was willing to take.   "

## 2022-02-06 NOTE — ED NOTES
Pt was seen by the DEC  and the ED provider who reportedly talked to the Pt significant other who feels the Pt should stay in Pt and has come and talk to the Pt about considering admission.  Pt significant other is here now talking to the Pt about possibly staying.

## 2022-02-06 NOTE — ED NOTES
"Patient was given the choice to take PO Zyprexa or and injection of Zyprexa to help calm himself down. Patient refused to take either, and continued to get more agitated with staff. Patient threatening to \"hurt someone if anyone tries to stop me from getting out of here\". Patient was very loud, agitated, and not tracking well with staff.   "

## 2022-02-06 NOTE — DISCHARGE INSTRUCTIONS
Psychiatry Appointment    Date: Friday, 2/11/2022  Time: 10:00 am - 11:00 am  Provider: Chelsey MISTRY PA-C  Location: [facility], [address] [place] [address] [address], [address], [place] [address]  Phone: [phone]  Type: Medication Mgmt - Initial (In-Person)    Scheduling Instructions  THERE HAS TO BE CORRECT INSURANCE AND CORRECT PHONE NUMBER TO SCHEDULE AN APPT. Chelsey accepts ages up to 72- NO EXCEPTIONS!! NO DEMENTIA, ALZHEIMER'S OR PARKINSON'S! Patients must live in [place].  Patient Instructions  Please arrive 10-15 minutes prior to your scheduled appointment time. Also, please bring the following items: Insurance Card and 's license. If language other than English will need to supply their own     [facility] will contact you in the next 24-48 hours to go over other resources including partial hospitalization or day treatment options.           Aftercare Plan  If I am feeling unsafe or I am in a crisis, I will:   Contact my established care providers   Call the National Suicide Prevention Lifeline: 720.846.8481   Go to the nearest emergency room   Call 490     Warning signs that I or other people might notice when a crisis is developing for me: feels blood pulsing faster, experiencing tunnel vision     Things I am able to do on my own to cope or help me feel better: taking walks or going for a run; riding bike     Things that I am able to do with others to cope or help me better: yoga with girlfriend      Things I can use or do for distraction: reading, listen to music     Changes I can make to support my mental health and wellness: get connected to trauma therapists      People in my life that I can ask for help: girlfriend    Your [facility] has a mental health crisis team you can call 24/7: [facility] Mobile Crisis  [phone] (adults)  [phone] (children)    Other things that are important when I m in crisis: notices a desire to go and hide when in crisis       Crisis  "Lines  Crisis Text Line  Text 228634  You will be connected with a trained live crisis counselor to provide support.    National Hope Line  1.800.SUICIDE [0670031]      Community Resources  Fast Tracker  Linking people to mental health and substance use disorder resources  [employer]     [place] Mental Health Warm Line  Peer to peer support  Monday thru Saturday, 12 pm to 10 pm  171.238.5758 or 5.802.095.3536  Text \"Support\" to 39495    National [place] [employer] Mental Illness (ARCHIE)  286.738.3503 or 1.888.ARCHIE.HELPS        Mental Health Apps  My3  https://myVita Soundpp.org/    VirtualHopeBox  https://[employer].org/apps/virtual-hope-box/  .  "

## 2022-02-06 NOTE — ED PROVIDER NOTES
"  History     Chief Complaint   Patient presents with     Psychiatric Evaluation     HPI  Guzman Davidson is a 26 year old male with PMH notable for bipolar one, PTSD, intermittent explosive disorder who presented to the [facility] ED on 2/5 with agitation.  Patient was evaluated by emergency department personnel and mental health .  Patient had noted that he is a trained fighter and gets episodes of rage where he \"does not know how to stop\", further noted being more triggered into his fits of rage recently.  The mental health  recommended admission, and plans were made for the patient to admit on a voluntary basis.      Patient had been boarding here in the [facility] ED while awaiting inpatient psychiatric bed availability.  He then began feeling increasingly frustrated at the wait.  Patient desired to leave.    Past Medical and Surgical History, Medications, Allergies, and Social History were reviewed in the electronic medical record. Review with the patient was attempted but limited due to altered mental status.      Review of Systems  A complete review of systems was attempted but limited due to altered mental status.    Physical Exam   BP: (!) 200/161  Pulse: 83  Temp: 97.8  F (36.6  C)  Resp: 18  Weight: 90.7 kg (200 lb)  SpO2: 99 %    Physical Exam  General: agitated. Fists clenched. Appears stated age.   HENT: MMM, no oropharyngeal lesions  Eyes: PERRL, normal sclerae   Cardio: Regular rate, extremities well perfused  Resp: Normal work of breathing, normal respiratory rate  Neuro: alert and fully oriented. CN II-XII grossly intact. Grossly normal strength and sensation in all extremities.   MSK: no deformities. Grossly normal ROM in extremities.   Integumentary/Skin: no rash visualized, normal color  Psych: very agitated affect, erratic and somewhat violent behavior. Insight poor. Judgement poor.       ED Course      Procedures       --  Subsequent Critical Care Addendum (Modifier -25)  This " patient had an Emergency Department evaluation and management performed by Dr. Bonilla at an earlier time that the patient did not require critical care. Subsequently, the patient's state changed such that critical care was medically necessary. The critical care provided was separate and distinct from the Emergency Department evaluation and management performed prior.     Based on my evaluation of the patient, Guzman Davidson has severe agitation, which requires immediate intervention, and therefore he is critically ill.     The care team initiated behavioral code 21, administered IM olanzapine, and initiated restraints/seclusion to provide stabilization care. Due to the critical nature of this patient, I reassessed vital signs, physical exam and mental status multiple times prior to his disposition.     Time also spent performing documentation, reviewing test results and coordination of care.     Critical care time (excluding teaching time and procedures): 40 minutes.        Labs Ordered and Resulted from Time of ED Arrival to Time of ED Departure   COVID-19 VIRUS (CORONAVIRUS) BY PCR - Abnormal       Result Value    SARS CoV2 PCR Positive (*)    DRUG ABUSE SCREEN 1 URINE (ED) - Abnormal    Amphetamines Urine Screen Negative      Barbiturates Urine Screen Negative      Benzodiazepines Urine Screen Negative      Cannabinoids Urine Screen Positive (*)     Cocaine Urine Screen Negative      Opiates Urine Screen Negative       No orders to display          Assessments & Plan (with Medical Decision Making)   Patient was noted by nursing to be getting progressively more angry. This provider assessed the patient, who was noted to be clenching his fists and shouting.  Attempted de-escalation and discussion with the patient to determine if he was safe to discharge versus requiring a hold.  Patient was unable to calm himself, rushed providers and went toward one of the doors in the emergency department.  Patient demonstrating  high risk of harm to self and others in his current state.  Health officer authority hold placed.  Behavioral code 21 was called.  Security and behavioral health personnel responded promptly.  Patient was unable to de-escalate, required restraints and IM sedating medications.    Upon reassessment following the interventions, the patient had calmed sufficiently for restraints to be discontinued.  Patient showed no signs of negative outcomes related to the restraint/seclusion.    Patient signed out to oncoming ED provider with plan for continued boarding for inpatient psychiatry, repeat DEC assessment with the patient now more calm in the morning for potential need of a 72-hour hold versus patient potentially being able to discharge should he desire to.        Final diagnoses:   Leann (H)   Agitation requiring sedation protocol   Intermittent explosive disorder in adult     New Prescriptions    No medications on file       --  Jose Daniel Hernandez MD   Emergency Medicine   [facility] EMERGENCY DEPARTMENT  2/5/2022     Jose Daniel Hernandez MD  02/06/22 0701

## 2022-02-06 NOTE — ED NOTES
GF called for updates. No information could be given to her at this time as there was no note for consent from patient in chart. Patient asleep at this time.

## 2022-02-06 NOTE — ED NOTES
Within an hour after restraint an in person face to face assessment was completed at 0208, including an evaluation of the patient's immediate reaction to the intervention, behavioral assessment and review/assessment of history, drugs and medications, recent labs, etc., and behavioral condition.  The patient experienced: No adverse physical outcome from seclusion/restraint initiation.  The intervention of restraint may discontinue, seclusion should continue.    Jose Daniel Hernandez MD  Emergency Medicine       Jose Daniel Hernandez MD  02/06/22 0202

## 2022-02-06 NOTE — ED NOTES
"Patient starting making threats to staff. Patient very agitated at this time. Patient yelling \"If you dont let me leave, I will hurt somebody\". Patient continued to be in room but make multiple threats at various staff members. Various staff members talking with patient, acknowloging his frustrations.   "

## 2022-02-06 NOTE — ED NOTES
[facility] ED Mental Health Handoff Note:       Brief HPI:  This is a 26 year old male signed out to me by Dr. Bonilla.  See initial ED Provider note for full details of the presentation.     Home meds reviewed and ordered/administered: Yes    Medically stable for inpatient mental health admission: Yes.    Evaluated by mental health: Yes. The recommendation is for inpatient mental health treatment. Bed search in process    Safety concerns: At the time I received sign out, there were no safety concerns.    Hold Status:  Active Orders   N/A           Exam:   Patient Vitals for the past 24 hrs:   BP Temp Temp src Pulse Resp SpO2 Weight   02/05/22 1839 119/67 98.2  F (36.8  C) Oral 88 16 97 % --   02/05/22 1805 (!) 121/99 -- -- 100 18 97 % --   02/05/22 1750 (!) 121/99 -- -- 100 18 97 % --   02/05/22 1540 110/83 -- -- 63 -- 98 % --   02/05/22 1107 (!) 200/161 97.8  F (36.6  C) -- 83 18 99 % 90.7 kg (200 lb)           ED Course:    Medications   gabapentin (NEURONTIN) capsule 300 mg ( Oral Canceled Entry 2/5/22 1900)   diazepam (VALIUM) tablet 5 mg (has no administration in time range)   Vitamin D3 (CHOLECALCIFEROL) tablet 125 mcg (has no administration in time range)   amphetamine-dextroamphetamine (ADDERALL) per tablet 10 mg (has no administration in time range)   OLANZapine zydis (zyPREXA) ODT tab 10 mg (10 mg Oral Given 2/5/22 1432)   LORazepam (ATIVAN) tablet 1 mg (1 mg Oral Given 2/5/22 1432)            There were no significant events during my shift.    Patient was signed out to the oncoming provider, Dr. Hernandez      Impression:    ICD-10-CM    1. Leann (H)  F30.9        Plan:    1. Awaiting inpatient mental health admission/transfer.      RESULTS:   Results for orders placed or performed during the hospital encounter of 02/05/22 (from the past 24 hour(s))   Asymptomatic COVID-19 Virus (Coronavirus) by PCR Nasopharyngeal     Status: Abnormal    Collection Time: 02/05/22  1:05 PM    Specimen: Nasopharyngeal;  Swab   Result Value Ref Range    SARS CoV2 PCR Positive (A) Negative, Testing sent to reference lab. Results will be returned via unsolicited result    Narrative    Testing was performed using the Xpert Xpress SARS-CoV-2 Assay on the  Cepheid Gene-Xpert Instrument Systems. Additional information about  this Emergency Use Authorization (EUA) assay can be found via the Lab  Guide. This test should be ordered for the detection of SARS-CoV-2 in  individuals who meet SARS-CoV-2 clinical and/or epidemiological  criteria. Test performance is unknown in asymptomatic patients. This  test is for in vitro diagnostic use under the FDA EUA for  laboratories certified under CLIA to perform high complexity testing.  This test has not been FDA cleared or approved. A negative result  does not rule out the presence of PCR inhibitors in the specimen or  target RNA in concentration below the limit of detection for the  assay. The possibility of a false negative should be considered if  the patient's recent exposure or clinical presentation suggests  COVID-19. This test was validated by the [facility]. This laboratory is certified under  the Clinical Laboratory Improvement Amendments of 1988 (CLIA-88) as  qualified to perform high complexity laboratory testing.     Urine Drugs of Abuse Screen     Status: Abnormal    Collection Time: 02/05/22  1:06 PM    Narrative    The following orders were created for panel order Urine Drugs of Abuse Screen.  Procedure                               Abnormality         Status                     ---------                               -----------         ------                     Drug abuse screen 1 urin...[[id number]]  Abnormal            Final result                 Please view results for these tests on the individual orders.   Drug abuse screen 1 urine (ED)     Status: Abnormal    Collection Time: 02/05/22  1:06 PM   Result Value Ref Range     Amphetamines Urine Screen Negative Screen Negative    Barbiturates Urine Screen Negative Screen Negative    Benzodiazepines Urine Screen Negative Screen Negative    Cannabinoids Urine Screen Positive (A) Screen Negative    Cocaine Urine Screen Negative Screen Negative    Opiates Urine Screen Negative Screen Negative             MD Javier Gonzalez Gregory Townley, MD  02/05/22 3654

## 2022-02-06 NOTE — SAFE
Guzman IRAIS Davidson  February 5, 2022  SAFE Note    Critical Safety Issues: Pt presents in the ED today with clear severe anger/rage emotions. Pt appears to be alert and insightful, as well as was oriented x4. During time of assessment, Pt stated her plan to act was  starting my car up in the garage and passing out.  Pt endorses active anger/rage episodes with severe intensity. Although Pt does not have an intended target, Pt expressed inability to  stop [his] anger.  Recommend inpatient psychiatric hospitalization for acute stabilization.      Current Suicidal Ideation/Self-Injurious Concerns/Methods: None - N/A      Current or Historical Inappropriate Sexual Behavior: No      Current or Historical Aggression/Homicidal Ideation: Agitation/Hyperactivity and Rage      Triggers:  Pt reports  little things like this is what causes me to go into a rage.  For example, Pt reports he was triggered today when attempted to purchase silver, but his bank flagged his purchase, thus preventing him from making the purchase.    Updated care team: Yes:  notified central intake and coordinator of inpatient admission.    For additional details see full [facility] assessment.       Santiago Horan, [facility]

## 2022-02-06 NOTE — ED NOTES
Pt in talking with the ED provider and the DEC  has agreed to an outpatient program and is awaiting the DEC  in setting that up before discharge.

## 2022-02-06 NOTE — ED NOTES
Pt after to talking to his significant other had asked her to leave and she has request to talk to the provider taking care of the Pt.  Pt significant other moved to the consult room and ED provider made aware.

## 2022-02-06 NOTE — ED NOTES
SIGN-OUT:  - Assumed care of this patient from Dr. Hernandez.  In short he was a voluntary mental health admission for specific anger and PTSD symptoms.  No reported suicidal or homicidal ideation.  Overnight he became quite agitated requiring chemical sedation, was wanting to leave.  Plan was to reassess once patient was calm and cooperative.  - Pending at shift change: Patient awaiting mental health admission.  He is voluntary at this time.  Would need reassessment if you want to leave to decide if he is holdable.      UPDATES / REASSESSMENT:  - Results:   -Patient was reassessed by the mental health .  Patient denies any suicidal or homicidal ideation.  Patient does have anger issues which he identifies but does not have a specific targets that he identifies.  Does not feel that he is a threat to any specific person.  Discussed the case extensively with his girlfriend who also corroborates history stating she does not feel that she is physically in danger for him and that often his anger out versus results in destruction of property but is not violent towards people.  She also denies any suicidal concerns.  Patient went back and forth on whether or not he wanted to be admitted citing the long wait in the ER.  Ultimately patient was deemed not to be holdable per mental health assessment and my own assessment.  Patient felt like he did not want to wait any longer for an inpatient mental health bed so he was provided with outpatient resources including psychiatric appointment and referral for an outpatient day treatment program.  Both patient and his girlfriend are in agreement with this plan.  They were instructed to call 911 and return to the ER if they have any concerns about safety.    General: awake, alert, NAD  Head: normal cephalic  HEENT: pupils equal, conjugate gaze intact  Neck: Supple  CV: regular rate   Lungs: Breathing comfortably on room air  Neuro: awake, answers questions appropriately. No focal  deficits noted   Psych: Patient is calm and cooperative with me on exam.  Makes good eye contact.  Denies suicidal or homicidal ideation.  Has normal speech fluency does not appear to be responding to internal stimuli.  No pressured speech, no flight of ideas.    - Reassessment:   -Review mental health assessment performed, patient was deemed not holdable.  Outpatient recommendations were made.  --- No acute issues or interventions necessary while still in the emergency department.    DISCUSSIONS:  - w/ Patient:   --- Reviewed available findings, discussions/recommendations, plan, need for and importance of close follow-up, strict return/safety precautions.   ---     DISPOSITION:  - IMPRESSION: PTSD  - DISPOSITION: Home with outpatient follow-up  - FOLLOW-UP: Psychiatric appointment and referral for day treatment center    MD Odilia Zuñiga, Sylvain Centeno MD  02/06/22 0194

## 2022-02-06 NOTE — ED NOTES
"[facility] Crisis Reassessment      Guzman Davidson was reassessed at the request of Dr. Hernandez for the following reasons: rage and agitation. Pt was first seen on 2/5/2022 by Santiago Horan; see the initial assessment note for details.      Patient Presentation    Initial ED presentation details: Pt attends the ED due to ongoing concerns of rage and agitation that he's experienced for most of his life. While in the ED pt became agitated and hostile requiring a code 21 and needing to be placed in restraints. He began making threats towards staff of hurting them if \"you don't let me leave.\" Pt was seen clenching his fists. He required a PRN and was able to calm after being taken out of restraints.     Current patient presentation: Pt is highly cooperative and engaged in discussion. He does not display any agitation in behavior or speech remaining quite calm throughout the interview.  Patient believes that the main source of his frustration yesterday was due to the long wait of being admitted into the hospital.  Patient was anticipating only having to wait 6 to 8 hours but once he was told he could be at 24 hours he began to experience high levels of frustration.  He feels that he is actively in the empathy and support the hospital so wishes to discharge and seek out outpatient therapy with those who specialize in trauma therapy.  He feels that a lot of his anger outbursts that he experiences in life are largely due to unresolved trauma from his childhood.  He currently has no thoughts or plans to harm others or himself.      Changes observed since initial assessment: Patient is more calm and cooperative in conversation and appearing more levelheaded.  He has not been making any threats toward staff and is more coherent in his speech.  Patient able to demonstrate the usefulness of future trauma therapy to help address unresolved issues.       Risk of Harm    Is the patient experiencing current suicidal ideation: No    Does the " "patient have thoughts of harming others? No       Mental Status Exam   Affect: Appropriate   Appearance: Appropriate    Attention Span/Concentration: Attentive?    Eye Contact: Engaged   Fund of Knowledge: Appropriate    Language /Speech Content: Fluent   Language /Speech Volume: Normal    Language /Speech Rate/Productions: Normal    Recent Memory: Intact   Remote Memory: Intact   Mood: Normal    Orientation to Person: Yes    Orientation to Place: Yes   Orientation to Time of Day: Yes    Orientation to Date: Yes    Situation (Do they understand why they are here?): Yes    Psychomotor Behavior: Normal    Thought Content: Clear   Thought Form: Intact       Additional Collateral Information   Met with pt's partner, Estela, who feels pt needs to have more consistent follow through with their mental health sx. Pt feels that he's good at \"passing assessments\" in order to get out of things, yet also acknowledges that he is quite motivated to improve his anger outbursts. She feels that admitting the pt into the hospital doesn't seem necessary, although highly encourages setting up consistent outpatient resources for him going forward in order to stabilize PTSD sx. She denies feeling unsafe with him discharging back to their apartment.       Therapeutic Intervention  The following therapeutic methodologies were employed when working with the patient: Establishing rapport, Active listening and Assess dimensions of crisis. Patient response to intervention: positive.      Disposition  Recommended disposition: Individual Therapy and Medication Management      Reviewed case and recommendations with attending provider. Attending Name: Dr. Hartley    Attending concurs with disposition: Yes      Patient concurs with disposition: Yes      Final disposition: Individual therapy  and Medication management.       Clinical Substantiation of Recommendations  Pt will plan on discharging with the resource of medication management, but " ultimately would highly benefit from outpatient therapy with a heavy focus on pt's trauma background. Pt tends to struggle with emotional dysregulation in the context of feeling hs reacts to threats in his environment that trigger his defense mechanisms. Pt does not feel the hospital has the capacity to provide him with the help that he needs at the moment.     While writing this, pt feels now that admission may be appropriate for him at this time. Dr. Finley is open to the pt admitting, yet states the pt is free to discharge from the hospital if they wish as pt does not meet sufficient criteria to be placed on a hold.       Assessment Details  Total duration spent on the patient case in minutes: .50 hrs     CPT code(s) utilized: 40984 - Psychotherapy for Crisis - 60 (30-74*) min       Domenic Loyd       Aftercare Plan  If I am feeling unsafe or I am in a crisis, I will:   Contact my established care providers   Call the National Suicide Prevention Lifeline: 555.329.4704   Go to the nearest emergency room   Call 911     Warning signs that I or other people might notice when a crisis is developing for me: feels blood pulsing faster, experiencing tunnel vision     Things I am able to do on my own to cope or help me feel better: taking walks or going for a run; riding bike     Things that I am able to do with others to cope or help me better: yoga with girlfriend      Things I can use or do for distraction: reading, listen to music     Changes I can make to support my mental health and wellness: get connected to trauma therapists      People in my life that I can ask for help: girlfriend    Your [facility] has a mental health crisis team you can call 24/7: [facility] Mobile Crisis  [phone] (adults)  [phone] (children)    Other things that are important when I m in crisis: notices a desire to go and hide when in crisis       Crisis Lines  Crisis Text Line  Text 269422  You will be connected with a  "trained live crisis counselor to provide support.    National Hope Line  1.800.SUICIDE [3808595]      Community Resources  Fast Tracker  Linking people to mental health and substance use disorder resources  [place].org     [place] Mental Health Warm Line  Peer to peer support  Monday thru Saturday, 12 pm to 10 pm  551.598.5568 or 4.901.936.0448  Text \"Support\" to 62399    National [place] [employer] Mental [employer]ness (ARCHIE)  231.522.0180 or 1.888.ARCHIE.HELPS        Mental Health Apps  My3  https://MONTAJpp.org/    VirtualHopeBox  https://[email or web address]/apps/virtual-hope-box/        "

## 2022-02-10 ENCOUNTER — MEDICAL CORRESPONDENCE (OUTPATIENT)
Dept: HEALTH INFORMATION MANAGEMENT | Facility: CLINIC | Age: 27
End: 2022-02-10
Payer: MEDICARE

## 2022-02-15 ENCOUNTER — INFUSION THERAPY VISIT (OUTPATIENT)
Dept: INFUSION THERAPY | Facility: CLINIC | Age: 27
End: 2022-02-15
Attending: PSYCHIATRY & NEUROLOGY
Payer: MEDICARE

## 2022-02-15 VITALS
WEIGHT: 206 LBS | OXYGEN SATURATION: 97 % | DIASTOLIC BLOOD PRESSURE: 71 MMHG | HEART RATE: 76 BPM | SYSTOLIC BLOOD PRESSURE: 121 MMHG | TEMPERATURE: 98.4 F | RESPIRATION RATE: 16 BRPM

## 2022-02-15 DIAGNOSIS — G35 MULTIPLE SCLEROSIS (H): Primary | ICD-10-CM

## 2022-02-15 PROCEDURE — 96375 TX/PRO/DX INJ NEW DRUG ADDON: CPT

## 2022-02-15 PROCEDURE — 250N000011 HC RX IP 250 OP 636: Performed by: PSYCHIATRY & NEUROLOGY

## 2022-02-15 PROCEDURE — 96365 THER/PROPH/DIAG IV INF INIT: CPT

## 2022-02-15 PROCEDURE — 258N000003 HC RX IP 258 OP 636: Performed by: PSYCHIATRY & NEUROLOGY

## 2022-02-15 PROCEDURE — 96366 THER/PROPH/DIAG IV INF ADDON: CPT

## 2022-02-15 PROCEDURE — 250N000013 HC RX MED GY IP 250 OP 250 PS 637: Performed by: PSYCHIATRY & NEUROLOGY

## 2022-02-15 PROCEDURE — 999N000128 HC STATISTIC PERIPHERAL IV START W/O US GUIDANCE

## 2022-02-15 RX ORDER — METHYLPREDNISOLONE SODIUM SUCCINATE 125 MG/2ML
125 INJECTION, POWDER, LYOPHILIZED, FOR SOLUTION INTRAMUSCULAR; INTRAVENOUS ONCE
Status: COMPLETED | OUTPATIENT
Start: 2022-02-15 | End: 2022-02-15

## 2022-02-15 RX ORDER — ACETAMINOPHEN 325 MG/1
650 TABLET ORAL ONCE
Status: CANCELLED | OUTPATIENT
Start: 2022-08-14

## 2022-02-15 RX ORDER — DIPHENHYDRAMINE HYDROCHLORIDE 50 MG/ML
50 INJECTION INTRAMUSCULAR; INTRAVENOUS
Status: CANCELLED
Start: 2022-08-14

## 2022-02-15 RX ORDER — NALOXONE HYDROCHLORIDE 0.4 MG/ML
0.2 INJECTION, SOLUTION INTRAMUSCULAR; INTRAVENOUS; SUBCUTANEOUS
Status: CANCELLED | OUTPATIENT
Start: 2022-08-14

## 2022-02-15 RX ORDER — MEPERIDINE HYDROCHLORIDE 25 MG/ML
25 INJECTION INTRAMUSCULAR; INTRAVENOUS; SUBCUTANEOUS EVERY 30 MIN PRN
Status: CANCELLED | OUTPATIENT
Start: 2022-08-14

## 2022-02-15 RX ORDER — ALBUTEROL SULFATE 90 UG/1
1-2 AEROSOL, METERED RESPIRATORY (INHALATION)
Status: CANCELLED
Start: 2022-08-14

## 2022-02-15 RX ORDER — DIPHENHYDRAMINE HCL 25 MG
50 CAPSULE ORAL ONCE
Status: COMPLETED | OUTPATIENT
Start: 2022-02-15 | End: 2022-02-15

## 2022-02-15 RX ORDER — METHYLPREDNISOLONE SODIUM SUCCINATE 125 MG/2ML
125 INJECTION, POWDER, LYOPHILIZED, FOR SOLUTION INTRAMUSCULAR; INTRAVENOUS
Status: CANCELLED
Start: 2022-08-14

## 2022-02-15 RX ORDER — EPINEPHRINE 1 MG/ML
0.3 INJECTION, SOLUTION INTRAMUSCULAR; SUBCUTANEOUS EVERY 5 MIN PRN
Status: CANCELLED | OUTPATIENT
Start: 2022-08-14

## 2022-02-15 RX ORDER — ACETAMINOPHEN 325 MG/1
650 TABLET ORAL ONCE
Status: COMPLETED | OUTPATIENT
Start: 2022-02-15 | End: 2022-02-15

## 2022-02-15 RX ORDER — DIPHENHYDRAMINE HCL 25 MG
50 CAPSULE ORAL ONCE
Status: CANCELLED | OUTPATIENT
Start: 2022-08-14

## 2022-02-15 RX ORDER — ALBUTEROL SULFATE 0.83 MG/ML
2.5 SOLUTION RESPIRATORY (INHALATION)
Status: CANCELLED | OUTPATIENT
Start: 2022-08-14

## 2022-02-15 RX ORDER — HEPARIN SODIUM,PORCINE 10 UNIT/ML
5 VIAL (ML) INTRAVENOUS
Status: CANCELLED | OUTPATIENT
Start: 2022-08-14

## 2022-02-15 RX ORDER — HEPARIN SODIUM (PORCINE) LOCK FLUSH IV SOLN 100 UNIT/ML 100 UNIT/ML
5 SOLUTION INTRAVENOUS
Status: CANCELLED | OUTPATIENT
Start: 2022-08-14

## 2022-02-15 RX ORDER — METHYLPREDNISOLONE SODIUM SUCCINATE 125 MG/2ML
125 INJECTION, POWDER, LYOPHILIZED, FOR SOLUTION INTRAMUSCULAR; INTRAVENOUS ONCE
Status: CANCELLED | OUTPATIENT
Start: 2022-08-14

## 2022-02-15 RX ADMIN — DIPHENHYDRAMINE HYDROCHLORIDE 50 MG: 25 CAPSULE ORAL at 11:09

## 2022-02-15 RX ADMIN — SODIUM CHLORIDE 50 ML: 9 INJECTION, SOLUTION INTRAVENOUS at 15:38

## 2022-02-15 RX ADMIN — ACETAMINOPHEN 650 MG: 325 TABLET ORAL at 11:09

## 2022-02-15 RX ADMIN — METHYLPREDNISOLONE SODIUM SUCCINATE 125 MG: 125 INJECTION, POWDER, FOR SOLUTION INTRAMUSCULAR; INTRAVENOUS at 11:27

## 2022-02-15 RX ADMIN — OCRELIZUMAB 600 MG: 300 INJECTION INTRAVENOUS at 11:51

## 2022-02-15 NOTE — PATIENT INSTRUCTIONS
Dear Guzman Davidson    Thank you for choosing [facility] ([facility]) for your infusion.  The following information is a summary of our appointment as well as important reminders.      We look forward in seeing you on your next appointment here at [facility] ([facility]).  Please don t hesitate to call us at [phone] to reschedule any of your appointments or to speak with one of the [facility] registered nurses.  It was a pleasure taking care of you today.    Sincerely,    [facility]  [address] [place] [address]  [id number]  Phone:  [phone]    Patient Education     Ocrelizumab Injection 30 mg/mL  Uses  For multiple sclerosis.  Instructions  This is an IV medicine. It is given through a sterile tube directly into the vein by a healthcare provider.  This medicine is given gradually through the IV line.  Always inspect the medicine before using.  Check the medicine before each use. If the liquid medicine has any particles in it, appears discolored, or if the vial appears damaged, do not use it.  Do not shake the medicine before using.  Keep this medicine in the refrigerator. Do not freeze.  Protect medicine from light.  This medicine should be given by a trained health care provider.  You must be monitored by a healthcare professional for at least 1 hour after each dose is given.  It is important that you keep taking each dose of this medicine on time even if you are feeling well.  If you miss a dose, contact your doctor for instructions.  Please tell your doctor and pharmacist about all the medicines you take. Include both prescription and over-the-counter medicines. Also tell them about any vitamins, herbal medicines, or anything else you take for your health.  If your symptoms do not improve or they worsen while on this medicine, contact your doctor.  Your  doctor may prescribe other medications to reduce side effects. Follow instructions carefully.  Talk to your doctor before taking other medicines, including aspirins and ibuprofen containing products. Speak to your doctor about which medicines are safe to use while you are on this medicine.  Do not suddenly stop taking this medicine. Check with your doctor before stopping.  It is very important that you follow your doctor's instructions for all blood tests.  It is very important that you keep all appointments for medical exams and tests while on this medicine.  Do not take the medicine more than once during 24 hours.  Cautions  Tell your doctor and pharmacist if you ever had an allergic reaction to a medicine. Symptoms of an allergic reaction can include trouble breathing, skin rash, itching, swelling, or severe dizziness.  Your doctor should check you for hepatitis before you start this medicine and while you are using it. Tell your doctor if you are being treated for hepatitis or any other infection.  Some patients on this medicine have developed severe, life-threatening infections. Please speak with your doctor about the risks and benefits of using this medicine.  Some patients taking this medicine have experienced serious side effects. Please speak with your doctor to understand the risks and benefits associated with this medicine.  This medicine may increase the risk of some types of cancer. Please speak with your doctor about the risks and benefits of using this medicine.  This medicine may cause dizziness or fainting, especially after exercising or in hot weather. Be very careful when standing or sitting up quickly.  Your ability to stay alert or to react quickly may be impaired by this medicine. Do not drive or operate machinery until you know how this medicine will affect you.  Please check with your doctor before drinking alcohol while on this medicine.  If you drink more than a few alcoholic beverages each  day, ask your doctor whether you should be on this medicine.  If possible, avoid using with marijuana or other medicines that can cause dizziness or drowsiness. These include allergy/cold products, muscle relaxers, sleep aids, and pain relievers.  Call the doctor if there are any signs of confusion or unusual changes in behavior.  This medicine may reduce your body's ability to fight infections. Try to avoid contact with people with colds, flu or other infections.  Contact your doctor if you develop any signs of a new infection such as fever, cough, sore throat, or chills.  Wash your hands often and avoid close contact with people with infections such as colds and flu.  Speak with your health care provider before receiving any vaccinations.  Tell the doctor or pharmacist if you are pregnant, planning to be pregnant, or breastfeeding.  Do not use this medicine if you are pregnant. If you become pregnant while on this medicine, contact your doctor immediately.  This medicine can hurt a new baby in the womb. If you become pregnant while on this medicine, tell your doctor immediately. Your doctor may switch you to a different medicine.  Women must use reliable forms of birth control while taking this medicine and for 6 months after stopping to prevent pregnancy.  Ask your pharmacist if this medicine can interact with any of your other medicines. Be sure to tell them about all the medicines you take.  Please tell all your doctors and dentists that you are on this medicine before they provide care.  Do not start or stop any other medicines without first speaking to your doctor or pharmacist.  This medicine can cause serious side effects in some patients. Important information from the [place] Food and Drug Administration (FDA) is available from your pharmacist. Please review it carefully with your pharmacist to understand the risks associated with this medicine.  Side Effects  The following is a list of some common side  effects from this medicine. Please speak with your doctor about what you should do if you experience these or other side effects.    coughing    reaction at the area of the injection (pain, redness, swelling)    stuffy nose  Call your doctor or get medical help right away if you notice any of these more serious side effects:    severe allergic reaction    loss of balance    breast lump or discharge    breast pain or swelling    difficulty concentrating    confusion    cough that does not go away    dizziness    fainting    feeling of heat or flushing    fever or chills    severe or persistent headache    fast or irregular heart beats    itching    memory problems or loss    muscle weakness    skin irritation such as redness, itching, rash, or burning    seizures    shortness of breath    difficulty speaking    unusual or unexplained tiredness or weakness    unsteadiness while walking    upper respiratory infection    blurring or changes of vision  A few people may have an allergic reactions to this medicine. Symptoms can include difficulty breathing, skin rash, itching, swelling, or severe dizziness. If you notice any of these symptoms, seek medical help quickly.  Extra  Please speak with your doctor, nurse, or pharmacist if you have any questions about this medicine.  [email or web address]  IMPORTANT NOTE: This document tells you briefly how to take your medicine, but it does not tell you all there is to know about it.Your doctor or pharmacist may give you other documents about your medicine. Please talk to them if you have any questions.Always follow their advice. There is a more complete description of this medicine available in English.Scan this code on your smartphone or tablet or use the web address below. You can also ask your pharmacist for a printout. If you have any questions, please ask your pharmacist.     2021 [employer].

## 2022-02-15 NOTE — PROGRESS NOTES
Nursing Note  Guzman Davidson [facility]:   Chief Complaint   Patient presents with     Infusion     Ocrevus     During [facility]'s [facility] appointment, orders from Dr Kirk were completed.  Frequency: every 6 months    Progress note:  Patient identification verified by name and date of birth.  Assessment completed.  Vitals recorded in Doc Flowsheets.  Patient was provided with education regarding medication/procedure and possible side effects.  Patient verbalized understanding.    Treatment Conditions: ~~~ NOTE: If the patient answers yes to any of the questions below, hold the infusion and contact ordering provider or on-call provider.    1. Have you recently had an elevated temperature, fever, chills, productive cough, coughing for 3 weeks or longer or hemoptysis, abnormal vital signs, night sweats, chest pain or have you noticed a decrease in your appetite, unexplained weight loss or fatigue? No  2. Do you have any open wounds or new incisions? No  3. Do you have any recent or upcoming hospitalizations, surgeries or dental procedures? No  4. Do you currently have or recently have had any signs of illness or infection or are you on any antibiotics? No  5. Have you had any new, sudden or worsening abdominal pain? No  6. Have you or anyone in your household received a live vaccination in the past 4 weeks? Please note: No live vaccines while on biologic/chemotherapy until 6 months after the last treatment.  Patient can receive the flu vaccine (shot only) and the pneumovax.  It is optimal for the patient to get these vaccines mid cycle, but they can be given at any time as long as it is not on the day of the infusion. No  7. Have you recently been diagnosed with any new nervous system diseases (ie. Multiple sclerosis, Guillain [place], seizures, neurological changes) or cancer diagnosis? No  8. Are you on any form of radiation or  chemotherapy? No  9. Are you pregnant or breast feeding or do you have plans of pregnancy in the future? No  10. Have you been having any signs of worsening depression or suicidal ideations? Not applicable  11. Have there been any other new onset medical symptoms? Yes, COVID positive in late December and again 2/5. Has cold like symptoms, runny nose. Had eye exam today and there is a small possibility that he could have glaucoma (enlarged optical nerve).  Dr Kirk paged for concern of COVID and new opthamalogy finding. He would like patient to proceed with infusion today.    Premedications: administered per order. Per patient, he can tolerate solumedrol but he does not do well with long term predisone    Drug Waste Record: No    Infusion length and rate:  infusion starts at 40 ml/hr, then increased by 40 ml/hr every 30 minutes to final rate of 200 ml/hr. Total infusion time about 3.5 hours    Labs: were not ordered for this appointment.    Vascular access: peripheral IV was placed by vascular access nurse.    Is the next appt scheduled? Not applicable  Asymptomatic COVID test completed? no    Post Infusion Assessment:  Patient tolerated infusion without incident.  Site patent and intact, free from redness, edema or discomfort.  Access discontinued per protocol.  Patient left after infusion finished without doing the 1 hour observation that was ordered. Dr Kirk was notified.  Biologic Infusion Post Education: Call the triage nurse at your clinic or seek medical attention if you have chills and/or temperature greater than or equal to 100.5, uncontrolled nausea/vomiting, diarrhea, constipation, dizziness, shortness of breath, chest pain, heart palpitations, weakness or any other new or concerning symptoms, questions or concerns.  You cannot have any live virus vaccines prior to or during treatment or up to 6 months post infusion.  If you have an upcoming surgery, medical procedure or dental procedure  during treatment, this should be discussed with your ordering physician and your surgeon/dentist.  If you are having any concerning symptom, if you are unsure if you should get your next infusion or wish to speak to a provider before your next infusion, please call your care coordinator or triage nurse at your clinic to notify them so we can adequately serve you.     Discharge Plan:   Follow up plan of care with: ongoing infusions at [facility]. and ordering provider as scheduled.  Discharge instructions were reviewed with patient.  Patient/representative verbalized understanding of discharge instructions and all questions answered.  Patient discharged [facility] in stable condition.    Gill Camarena RN       Administrations This Visit     acetaminophen (TYLENOL) tablet 650 mg     Admin Date  02/15/2022 Action  Given Dose  650 mg Route  Oral Administered By  Gill Camarena RN          diphenhydrAMINE (BENADRYL) capsule 50 mg     Admin Date  02/15/2022 Action  Given Dose  50 mg Route  Oral Administered By  Gill Camarena RN          methylPREDNISolone sodium succinate (solu-MEDROL) injection 125 mg     Admin Date  02/15/2022 Action  Given Dose  125 mg Route  Intravenous Administered By  Gill Camarena RN          ocrelizumab (OCREVUS) 600 mg in sodium chloride 0.9 % 500 mL infusion     Admin Date  02/15/2022 Action  New Bag Dose  600 mg Route  Intravenous Administered By  Gill Camarena RN                /71 (BP Location: Left arm)   Pulse 57   Temp 98.4  F (36.9  C)   Resp 16   Wt 93.4 kg (206 lb)   SpO2 99%

## 2022-02-15 NOTE — LETTER
2/15/2022         RE: Guzman Davidson  [address]  [address]  [facility] [address]        Dear Colleague,    Thank you for referring your patient, Guzman Davidson, to the [facility]. Please see a copy of my visit note below.    Nursing Note  Guzman Davidson [facility] for:   Chief Complaint   Patient presents with     Infusion     Ocrevus     During [facility]'s [facility] appointment, orders from Dr Kirk were completed.  Frequency: every 6 months    Progress note:  Patient identification verified by name and date of birth.  Assessment completed.  Vitals recorded in Doc Flowsheets.  Patient was provided with education regarding medication/procedure and possible side effects.  Patient verbalized understanding.    Treatment Conditions: ~~~ NOTE: If the patient answers yes to any of the questions below, hold the infusion and contact ordering provider or on-call provider.    1. Have you recently had an elevated temperature, fever, chills, productive cough, coughing for 3 weeks or longer or hemoptysis, abnormal vital signs, night sweats, chest pain or have you noticed a decrease in your appetite, unexplained weight loss or fatigue? No  2. Do you have any open wounds or new incisions? No  3. Do you have any recent or upcoming hospitalizations, surgeries or dental procedures? No  4. Do you currently have or recently have had any signs of illness or infection or are you on any antibiotics? No  5. Have you had any new, sudden or worsening abdominal pain? No  6. Have you or anyone in your household received a live vaccination in the past 4 weeks? Please note: No live vaccines while on biologic/chemotherapy until 6 months after the last treatment.  Patient can receive the flu vaccine (shot only) and the pneumovax.  It is optimal for the patient to get these vaccines mid cycle, but they can be given  at any time as long as it is not on the day of the infusion. No  7. Have you recently been diagnosed with any new nervous system diseases (ie. Multiple sclerosis, Guillain [place], seizures, neurological changes) or cancer diagnosis? No  8. Are you on any form of radiation or chemotherapy? No  9. Are you pregnant or breast feeding or do you have plans of pregnancy in the future? No  10. Have you been having any signs of worsening depression or suicidal ideations? Not applicable  11. Have there been any other new onset medical symptoms? Yes, COVID positive in late December and again 2/5. Has cold like symptoms, runny nose. Had eye exam today and there is a small possibility that he could have glaucoma (enlarged optical nerve).  Dr Kirk paged for concern of COVID and new opthamalogy finding. He would like patient to proceed with infusion today.    Premedications: administered per order. Per patient, he can tolerate solumedrol but he does not do well with long term predisone    Drug Waste Record: No    Infusion length and rate:  infusion starts at 40 ml/hr, then increased by 40 ml/hr every 30 minutes to final rate of 200 ml/hr. Total infusion time about 3.5 hours    Labs: were not ordered for this appointment.    Vascular access: peripheral IV was placed by vascular access nurse.    Is the next appt scheduled? Not applicable  Asymptomatic COVID test completed? no    Post Infusion Assessment:  Patient tolerated infusion without incident.  Site patent and intact, free from redness, edema or discomfort.  Access discontinued per protocol.  Patient left after infusion finished without doing the 1 hour observation that was ordered. Dr Kirk was notified.  Biologic Infusion Post Education: Call the triage nurse at your clinic or seek medical attention if you have chills and/or temperature greater than or equal to 100.5, uncontrolled nausea/vomiting, diarrhea, constipation, dizziness, shortness of breath, chest  pain, heart palpitations, weakness or any other new or concerning symptoms, questions or concerns.  You cannot have any live virus vaccines prior to or during treatment or up to 6 months post infusion.  If you have an upcoming surgery, medical procedure or dental procedure during treatment, this should be discussed with your ordering physician and your surgeon/dentist.  If you are having any concerning symptom, if you are unsure if you should get your next infusion or wish to speak to a provider before your next infusion, please call your care coordinator or triage nurse at your clinic to notify them so we can adequately serve you.     Discharge Plan:   Follow up plan of care with: ongoing infusions at [facility]. and ordering provider as scheduled.  Discharge instructions were reviewed with patient.  Patient/representative verbalized understanding of discharge instructions and all questions answered.  Patient discharged from [facility] in stable condition.    Gill Camarena RN       Administrations This Visit     acetaminophen (TYLENOL) tablet 650 mg     Admin Date  02/15/2022 Action  Given Dose  650 mg Route  Oral Administered By  Gill Camarena RN          diphenhydrAMINE (BENADRYL) capsule 50 mg     Admin Date  02/15/2022 Action  Given Dose  50 mg Route  Oral Administered By  Gill Camarena RN          methylPREDNISolone sodium succinate (solu-MEDROL) injection 125 mg     Admin Date  02/15/2022 Action  Given Dose  125 mg Route  Intravenous Administered By  Gill Camarena RN          ocrelizumab (OCREVUS) 600 mg in sodium chloride 0.9 % 500 mL infusion     Admin Date  02/15/2022 Action  New Bag Dose  600 mg Route  Intravenous Administered By  Gill Camarena RN                /71 (BP Location: Left arm)   Pulse 57   Temp 98.4  F (36.9  C)   Resp 16   Wt 93.4  kg (206 lb)   SpO2 99%         Again, thank you for allowing me to participate in the care of your patient.        Sincerely,        [facility]

## 2022-02-24 ENCOUNTER — OFFICE VISIT (OUTPATIENT)
Dept: ANESTHESIOLOGY | Facility: CLINIC | Age: 27
End: 2022-02-24
Attending: PSYCHIATRY & NEUROLOGY
Payer: MEDICARE

## 2022-02-24 ENCOUNTER — TRANSFERRED RECORDS (OUTPATIENT)
Dept: HEALTH INFORMATION MANAGEMENT | Facility: CLINIC | Age: 27
End: 2022-02-24

## 2022-02-24 VITALS — OXYGEN SATURATION: 99 % | SYSTOLIC BLOOD PRESSURE: 104 MMHG | DIASTOLIC BLOOD PRESSURE: 74 MMHG | HEART RATE: 67 BPM

## 2022-02-24 DIAGNOSIS — G89.4 CHRONIC PAIN SYNDROME: ICD-10-CM

## 2022-02-24 LAB — PHQ9 SCORE: 16

## 2022-02-24 PROCEDURE — 99207 PR NO BILLABLE SERVICE THIS VISIT: CPT | Performed by: ANESTHESIOLOGY

## 2022-02-24 ASSESSMENT — PAIN SCALES - GENERAL: PAINLEVEL: MILD PAIN (3)

## 2022-02-24 NOTE — NURSING NOTE
Patient presents with:  Consult: UMP NEW, RM 4, patient reports, 3/10 pain in his low back and neck      Mild Pain (3)         What medications are you using for pain?   Medicanal cannabis    (New patients only) Have you been seen by another pain clinic/ provider? N/A      Patient is looking to treat pain to the point where he is not effected mentally.      Connor Ochoa, EMT

## 2022-02-24 NOTE — LETTER
2/24/2022       RE: Guzman Davidson  [address]  [address]  [facility] [address]     Dear Colleague,    Thank you for referring your patient, Guzman Davidson, to the [facility]. Please see a copy of my visit note below.    The patient left the clinic before I could see him.       Again, thank you for allowing me to participate in the care of your patient.      Sincerely,    Lois Walker MD

## 2022-02-24 NOTE — LETTER
Date:February 28, 2022      Provider requested that no letter be sent. Do not send.       [facility]

## 2022-04-05 ENCOUNTER — TELEPHONE (OUTPATIENT)
Dept: NEUROLOGY | Facility: CLINIC | Age: 27
End: 2022-04-05
Payer: MEDICARE

## 2022-04-05 NOTE — TELEPHONE ENCOUNTER
[facility] Call Center    Phone Message    May a detailed message be left on voicemail: yes     Reason for Call: Other: Patient requests a referral for a sleep specialists.  He is not sleeping.  Please call patient back with referral  [phone]     Action Taken: Message routed to:  [facility] ANGIE MS    Travel Screening: Not Applicable

## 2022-04-06 DIAGNOSIS — G47.00 INSOMNIA, UNSPECIFIED TYPE: Primary | ICD-10-CM

## 2022-04-11 NOTE — TELEPHONE ENCOUNTER
Called and left patient a message about referral being placed, also left phone number for patient to call if he would like to schedule his sleep study  Nancy Vang MA

## 2022-04-27 ENCOUNTER — TRANSFERRED RECORDS (OUTPATIENT)
Dept: HEALTH INFORMATION MANAGEMENT | Facility: CLINIC | Age: 27
End: 2022-04-27
Payer: MEDICARE

## 2022-05-06 ENCOUNTER — TRANSFERRED RECORDS (OUTPATIENT)
Dept: HEALTH INFORMATION MANAGEMENT | Facility: CLINIC | Age: 27
End: 2022-05-06
Payer: MEDICARE

## 2022-09-01 ENCOUNTER — OFFICE VISIT (OUTPATIENT)
Dept: NEUROLOGY | Facility: CLINIC | Age: 27
End: 2022-09-01
Attending: PSYCHIATRY & NEUROLOGY
Payer: MEDICARE

## 2022-09-01 VITALS
DIASTOLIC BLOOD PRESSURE: 73 MMHG | WEIGHT: 204 LBS | HEART RATE: 61 BPM | SYSTOLIC BLOOD PRESSURE: 109 MMHG | OXYGEN SATURATION: 95 %

## 2022-09-01 DIAGNOSIS — E55.9 VITAMIN D DEFICIENCY: ICD-10-CM

## 2022-09-01 DIAGNOSIS — G35 MULTIPLE SCLEROSIS (H): ICD-10-CM

## 2022-09-01 DIAGNOSIS — G35 MS (MULTIPLE SCLEROSIS) (H): Primary | ICD-10-CM

## 2022-09-01 PROCEDURE — 99214 OFFICE O/P EST MOD 30 MIN: CPT | Mod: GC | Performed by: PSYCHIATRY & NEUROLOGY

## 2022-09-01 RX ORDER — MULTIVITAMIN
1 TABLET ORAL DAILY
Qty: 90 TABLET | Refills: 3 | Status: SHIPPED | OUTPATIENT
Start: 2022-09-01

## 2022-09-01 RX ORDER — GABAPENTIN 300 MG/1
CAPSULE ORAL
Qty: 90 CAPSULE | Refills: 11 | Status: SHIPPED | OUTPATIENT
Start: 2022-09-01

## 2022-09-01 RX ORDER — ACETAMINOPHEN 325 MG/1
650 TABLET ORAL EVERY 6 HOURS PRN
COMMUNITY
Start: 2022-08-24

## 2022-09-01 RX ORDER — PSEUDOEPHEDRINE HCL 30 MG
TABLET ORAL EVERY 4 HOURS PRN
COMMUNITY

## 2022-09-01 RX ORDER — METAPROTERENOL SULFATE 10 MG
1000 TABLET ORAL DAILY
Qty: 180 CAPSULE | Refills: 3 | Status: SHIPPED | OUTPATIENT
Start: 2022-09-01

## 2022-09-01 NOTE — PROGRESS NOTES
Multiple Sclerosis Clinic Visit  09/01/2022    Reason: Multiple Sclerosis     Source of information: Patient and chart review. Accompanied by his girlfriend Elicia     History of Present Symptom:  Guzman Davidson is a 27 year old male with a PMH significant for tobacco use and multiple sclerosis who presents today for follow up.     States he is no longer homeless as of the past 4 months. He had a significant injury jumping off a wall doing parkour. States he did not see a similar wall a few feet in front of him which he collided with. He met with an orthopedist and was provided a brace for a fractured patella.     Has has pain, mainly back pain. Using marijuana for pain on his own.     Taking Zoloft 50 mg daily. Reports inability to climax. This has been off and on in the past few years, wonders if it is associated with the medication. Has tried viagra in the past. Not currently following with a psychiatrist but does have a mental health appointment coming up.     Disease onset: age 15 with double vision and gait abnormalities and severe vertigo and vomitting.   Most recent relapse: Reports a few minor relapses since initial symptoms. Mainly loss of function of the left leg.   Previous disease modifying therapy:   Glatiramer acetate (intermitent use due to insurance and moving) resulting in subsequent relapse.  Ocrelizumab started 2018, last infusion 2/15/2022     There was concern for new non-enhancing lesion in the right frontal lobe but this was questioned by his previous neurologist. No comparison available for our review.     He is taking vitamin D dose unclear, level was 10 mcg/L in the past   Uses cannabis, helps with spasticity.      The patient's medical, surgical, social, and family history were personally reviewed with the patient.       No family history on file.  Current Outpatient Medications   Medication    amphetamine-dextroamphetamine (ADDERALL) 10 MG tablet    Cholecalciferol (VITAMIN D-3) 125  "MCG (5000 UT) TABS    diazepam (VALIUM) 5 MG tablet    gabapentin (NEURONTIN) 300 MG capsule    NONFORMULARY     No current facility-administered medications for this visit.     Allergies   Allergen Reactions    Cocoa Butter Other (See Comments)     Other reaction(s): Other (See Comments)  Skin irritation with older or , fresh is ok   Skin irritation      Corticosteroids Other (See Comments)     Other reaction(s): With long term prednisone usage    Steroids \"homicidal rage.\"  States 'rage'    Lactase Diarrhea     Bloating, gas    Sulfa Drugs Diarrhea     Bloating, gas  Bloating, gas         Review of Systems:  14-point review of systems was completed. The pertinent positives and negatives are in the HPI.    Physical Examination   General: Patient appears comfortable in no acute distress.   HEENT: NC/AT, no icterus, moist mucous membranes  Chest: non-labored on RA  Extremities: Warm, no edema  Skin: No rash or lesion   Psych: Affect appropriate for situation   Neuro:  Mental status: Awake, alert, attentive. Language is fluent with intact comprehension of commands.  Cranial nerves: PERRL with no relative afferent pupillary defect, conjugate gaze, EOMI, visual fields intact, face symmetric, shoulder shrug strong, tongue protrusion/uvula midline, no dysarthria.   Motor: Normal muscle bulk and tone. No abnormal movements. 5/5 strength in 4/4 extremities.     R L  Deltoid  5 5  Biceps  5 5  Triceps  5 5  Wrist ext 5 5  Finger ext 5 5  Finger abd 5 5    Hip flexion 5 5  Knee flexion 5 5  Knee ext 5 5  Dorsiflexion 5 5    Reflexes: brisk reflexes symmetric in biceps, brachioradialis, 3+ patellae with cross, and achilles. No clonus.  Sensory: Intact to light touch, vibration. Romberg is negative.   Coordination: FNF without ataxia or dysmetria.    Gait: Wide base gait, stride length. Tandem walk impaired. Some difficulty balancing on right foot.     Laboratory:  All laboratory data reviewed    Imaging:    MRI  " reviewed in PACs.     Assessment/Plan:  Guzman Davidson is a 27 year old male who presents for follow up for multiple sclerosis. He has been stable on Ocrevus clinically although he has not had imaging recently. He is tolerating the infusions well. He reports he has mild balance difficulty and a few falls, particularly on stairs. He also endorses sexual dysfunction. We discussed that his cervical cord lesions may be contributing to this.     We discussed that considering his complex mental health history including recent episodes of megha and mood swings it would be best if he continues his zoloft as prescribed for now and establish with a regular mental health provider who can best advise him on how to procceed. We discussed that something in another drug class like Wellbutrin may not have similar side effects but that decision and transition should be followed closely by a specialist.     Gabapentin was helpful in the past for spasticity but he has run out.     -we will re-order gabapentin 300 mg TID   -MRI   -continue ocrevus (scheduled for this month)   -blood tests on day of infusion   -we ordered vitamin D and other multivitamins for him     Return to clinic in 6 months     Patient seen and discussed with Dr. Kirk.   I have reviewed the plan with the patient, who is in agreement.      Akanksha Hardin DO  Multiple Sclerosis Fellow     Attending physician: I saw and evaluated the patient with Dr. Hardin and I agree with her findings and plan of care as documented above, with the following additions.    Mr. Davidson has transferred his ocrelizumab infusions to our center and will proceed with the next infusion later this month. Prior to the infusion I will check routine laboratory studies for monitoring of this medication to include complete blood counts with differential, hepatic panel, and CD19 count. I will also check COVID-19 spike protein antibody and advise him as to whether he needs to receive the  Evusheld antibody product for prevention of COVID-19 infection based on these results.    We will also obtain MRI scans of his brain and cervical spine at his convenience to monitor the radiologic stability of his condition.    He requests prescription for gabapentin 300 TID today, which he perceives as helpful for pain related to spasticity, and this was provided.    Return to clinic in 6 months. Remainder as above.    Brandon Gross MD   of Neurology  [facility]    Diagnoses:    1) Multiple sclerosis  2) Spasticity

## 2022-09-01 NOTE — Clinical Note
9/1/2022       RE: Guzman Davidson  [address]  [id number]  [facility] [address]     Dear Colleague,    Thank you for referring your patient, Guzman Davidson, to the [facility] [place] [facility]. Please see a copy of my visit note below.    Multiple Sclerosis Clinic Visit  09/01/2022    Reason: Multiple Sclerosis     Source of information: Patient and chart review. Accompanied by his girlfriend Elicia     History of Present Symptom:  Guzman Davidson is a 27 year old male with a PMH significant for tobacco use and multiple sclerosis who presents today for follow up.     States he is no longer homeless as of the past 4 months. He had a significant injury jumping off a wall doing parkour. States he did not see a similar wall a few feet in front of him which he collided with. He met with an orthopedist and was provided a brace for a fractured patella.     Has has pain, mainly back pain. Using marijuana for pain on his own.     Taking Zoloft 50 mg daily. Reports inability to climax. This has been off and on in the past few years, wonders if it is associated with the medication. Has tried viagra in the past. Not currently following with a psychiatrist but does have a mental health appointment coming up.     Disease onset: age 15 with double vision and gait abnormalities and severe vertigo and vomitting.   Most recent relapse: Reports a few minor relapses since initial symptoms. Mainly loss of function of the left leg.   Previous disease modifying therapy:   Glatiramer acetate (intermitent use due to insurance and moving) resulting in subsequent relapse.  Ocrelizumab started 2018, last infusion 2/15/2022     There was concern for new non-enhancing lesion in the right frontal lobe but this was questioned by his previous neurologist. No comparison available for our review.     He is taking vitamin D dose unclear, level was 10 mcg/L  "in the past   Uses cannabis, helps with spasticity.      The patient's medical, surgical, social, and family history were personally reviewed with the patient.       No family history on file.  Current Outpatient Medications   Medication    amphetamine-dextroamphetamine (ADDERALL) 10 MG tablet    Cholecalciferol (VITAMIN D-3) 125 MCG (5000 UT) TABS    diazepam (VALIUM) 5 MG tablet    gabapentin (NEURONTIN) 300 MG capsule    NONFORMULARY     No current facility-administered medications for this visit.     Allergies   Allergen Reactions    Cocoa Butter Other (See Comments)     Other reaction(s): Other (See Comments)  Skin irritation with older or , fresh is ok   Skin irritation      Corticosteroids Other (See Comments)     Other reaction(s): With long term prednisone usage    Steroids \"homicidal rage.\"  States 'rage'    Lactase Diarrhea     Bloating, gas    Sulfa Drugs Diarrhea     Bloating, gas  Bloating, gas         Review of Systems:  14-point review of systems was completed. The pertinent positives and negatives are in the HPI.    Physical Examination   General: Patient appears comfortable in no acute distress.   HEENT: NC/AT, no icterus, moist mucous membranes  Chest: non-labored on RA  Extremities: Warm, no edema  Skin: No rash or lesion   Psych: Affect appropriate for situation   Neuro:  Mental status: Awake, alert, attentive. Language is fluent with intact comprehension of commands.  Cranial nerves: PERRL with no relative afferent pupillary defect, conjugate gaze, EOMI, visual fields intact, face symmetric, shoulder shrug strong, tongue protrusion/uvula midline, no dysarthria.   Motor: Normal muscle bulk and tone. No abnormal movements. 5/5 strength in 4/4 extremities.     R L  Deltoid  5 5  Biceps  5 5  Triceps  5 5  Wrist ext 5 5  Finger ext 5 5  Finger abd 5 5    Hip flexion 5 5  Knee flexion 5 5  Knee ext 5 5  Dorsiflexion 5 5    Reflexes: brisk reflexes symmetric in biceps, brachioradialis, 3+ " patellae with cross, and achilles. No clonus.  Sensory: Intact to light touch, vibration. Romberg is negative.   Coordination: FNF without ataxia or dysmetria.    Gait: Wide base gait, stride length. Tandem walk impaired. Some difficulty balancing on right foot.     Laboratory:  All laboratory data reviewed    Imaging:    MRI 2021 reviewed in PACs.     Assessment/Plan:  Guzman Davidson is a 27 year old male who presents for follow up for multiple sclerosis. He has been stable on Ocrevus clinically although he has not had imaging recently. He is tolerating the infusions well. He reports he has mild balance difficulty and a few falls, particularly on stairs. He also endorses sexual dysfunction. We discussed that his cervical cord lesions may be contributing to this.     We discussed that considering his complex mental health history including recent episodes of megha and mood swings it would be best if he continues his zoloft as prescribed for now and establish with a regular mental health provider who can best advise him on how to procceed. We discussed that something in another drug class like Wellbutrin may not have similar side effects but that decision and transition should be followed closely by a specialist.     Gabapentin was helpful in the past for spasticity but he has run out.     -we will re-order gabapentin 300 mg TID   -MRI   -continue ocrevus (scheduled for this month)   -blood tests on day of infusion   -we ordered vitamin D and other multivitamins for him     Return to clinic in 6 months     Patient seen and discussed with Dr. Kirk.   I have reviewed the plan with the patient, who is in agreement.      Akanksha Hardin DO  Multiple Sclerosis Fellow     Attending physician: I saw and evaluated the patient with Dr. Hardin and I agree with her findings and plan of care as documented above, with the following additions.    Mr. Davidson has transferred his ocrelizumab infusions to our center and will  proceed with the next infusion later this month. Prior to the infusion I will check routine laboratory studies for monitoring of this medication to include complete blood counts with differential, hepatic panel, and CD19 count. I will also check COVID-19 spike protein antibody and advise him as to whether he needs to receive the Evusheld antibody product for prevention of COVID-19 infection based on these results.    We will also obtain MRI scans of his brain and cervical spine at his convenience to monitor the radiologic stability of his condition.    He requests prescription for gabapentin 300 TID today, which he perceives as helpful for pain related to spasticity, and this was provided.    Return to clinic in 6 months. Remainder as above.    Brandon Gross MD   of Neurology  [facility]    Diagnoses:    1) Multiple sclerosis  2) Spasticity            Again, thank you for allowing me to participate in the care of your patient.      Sincerely,    Brandon Gross MD

## 2022-09-01 NOTE — LETTER
9/1/2022      RE: Guzman Davidson  [address]  [id number]  [facility] [address]       Multiple Sclerosis Clinic Visit  09/01/2022    Reason: Multiple Sclerosis     Source of information: Patient and chart review. Accompanied by his girlfriend Elicia     History of Present Symptom:  Guzman Davidson is a 27 year old male with a PMH significant for tobacco use and multiple sclerosis who presents today for follow up.     States he is no longer homeless as of the past 4 months. He had a significant injury jumping off a wall doing parkour. States he did not see a similar wall a few feet in front of him which he collided with. He met with an orthopedist and was provided a brace for a fractured patella.     Has has pain, mainly back pain. Using marijuana for pain on his own.     Taking Zoloft 50 mg daily. Reports inability to climax. This has been off and on in the past few years, wonders if it is associated with the medication. Has tried viagra in the past. Not currently following with a psychiatrist but does have a mental health appointment coming up.     Disease onset: age 15 with double vision and gait abnormalities and severe vertigo and vomitting.   Most recent relapse: Reports a few minor relapses since initial symptoms. Mainly loss of function of the left leg.   Previous disease modifying therapy:   Glatiramer acetate (intermitent use due to insurance and moving) resulting in subsequent relapse.  Ocrelizumab started 2018, last infusion 2/15/2022     There was concern for new non-enhancing lesion in the right frontal lobe but this was questioned by his previous neurologist. No comparison available for our review.     He is taking vitamin D dose unclear, level was 10 mcg/L in the past   Uses cannabis, helps with spasticity.      The patient's medical, surgical, social, and family history were personally reviewed with the patient.       No family history on file.  Current Outpatient Medications   Medication  "    amphetamine-dextroamphetamine (ADDERALL) 10 MG tablet     Cholecalciferol (VITAMIN D-3) 125 MCG (5000 UT) TABS     diazepam (VALIUM) 5 MG tablet     gabapentin (NEURONTIN) 300 MG capsule     NONFORMULARY     No current facility-administered medications for this visit.     Allergies   Allergen Reactions     Cocoa Butter Other (See Comments)     Other reaction(s): Other (See Comments)  Skin irritation with older or , fresh is ok   Skin irritation       Corticosteroids Other (See Comments)     Other reaction(s): With long term prednisone usage    Steroids \"homicidal rage.\"  States 'rage'     Lactase Diarrhea     Bloating, gas     Sulfa Drugs Diarrhea     Bloating, gas  Bloating, gas         Review of Systems:  14-point review of systems was completed. The pertinent positives and negatives are in the HPI.    Physical Examination   General: Patient appears comfortable in no acute distress.   HEENT: NC/AT, no icterus, moist mucous membranes  Chest: non-labored on RA  Extremities: Warm, no edema  Skin: No rash or lesion   Psych: Affect appropriate for situation   Neuro:  Mental status: Awake, alert, attentive. Language is fluent with intact comprehension of commands.  Cranial nerves: PERRL with no relative afferent pupillary defect, conjugate gaze, EOMI, visual fields intact, face symmetric, shoulder shrug strong, tongue protrusion/uvula midline, no dysarthria.   Motor: Normal muscle bulk and tone. No abnormal movements. 5/5 strength in 4/4 extremities.     R L  Deltoid  5 5  Biceps  5 5  Triceps  5 5  Wrist ext 5 5  Finger ext 5 5  Finger abd 5 5    Hip flexion 5 5  Knee flexion 5 5  Knee ext 5 5  Dorsiflexion 5 5    Reflexes: brisk reflexes symmetric in biceps, brachioradialis, 3+ patellae with cross, and achilles. No clonus.  Sensory: Intact to light touch, vibration. Romberg is negative.   Coordination: FNF without ataxia or dysmetria.    Gait: Wide base gait, stride length. Tandem walk impaired. Some " difficulty balancing on right foot.     Laboratory:  All laboratory data reviewed    Imaging:    MRI 2021 reviewed in PACs.     Assessment/Plan:  Guzman Davidson is a 27 year old male who presents for follow up for multiple sclerosis. He has been stable on Ocrevus clinically although he has not had imaging recently. He is tolerating the infusions well. He reports he has mild balance difficulty and a few falls, particularly on stairs. He also endorses sexual dysfunction. We discussed that his cervical cord lesions may be contributing to this.     We discussed that considering his complex mental health history including recent episodes of megha and mood swings it would be best if he continues his zoloft as prescribed for now and establish with a regular mental health provider who can best advise him on how to procceed. We discussed that something in another drug class like Wellbutrin may not have similar side effects but that decision and transition should be followed closely by a specialist.     Gabapentin was helpful in the past for spasticity but he has run out.     -we will re-order gabapentin 300 mg TID   -MRI   -continue ocrevus (scheduled for this month)   -blood tests on day of infusion   -we ordered vitamin D and other multivitamins for him     Return to clinic in 6 months     Patient seen and discussed with Dr. Kirk.   I have reviewed the plan with the patient, who is in agreement.      Akanksha Hardin DO  Multiple Sclerosis Fellow     Attending physician: I saw and evaluated the patient with Dr. Hardin and I agree with her findings and plan of care as documented above, with the following additions.    Mr. Davidson has transferred his ocrelizumab infusions to our center and will proceed with the next infusion later this month. Prior to the infusion I will check routine laboratory studies for monitoring of this medication to include complete blood counts with differential, hepatic panel, and CD19 count. I  will also check COVID-19 spike protein antibody and advise him as to whether he needs to receive the Evusheld antibody product for prevention of COVID-19 infection based on these results.    We will also obtain MRI scans of his brain and cervical spine at his convenience to monitor the radiologic stability of his condition.    He requests prescription for gabapentin 300 TID today, which he perceives as helpful for pain related to spasticity, and this was provided.    Return to clinic in 6 months. Remainder as above.    Brandon Gross MD   of Neurology  [facility]    Diagnoses:    1) Multiple sclerosis  2) Spasticity    Cc:  NAZ Young (PCP)  Patient          Brandon Gross MD

## 2022-09-01 NOTE — PATIENT INSTRUCTIONS
Proceed with Ocrevus infusion later this month as scheduled    2.   Restart gabapentin 300 mg three times daily    3.   Take vitamin D 5000 units daily, multivitamin, and fish oil    4.   Blood tests on day of Ocrevus infusion prior to appointment    5.   Schedule MRI scans of the brain and cervical spine at your convenience    6.   Return to clinic in 6 months

## 2022-09-12 ENCOUNTER — INFUSION THERAPY VISIT (OUTPATIENT)
Dept: INFUSION THERAPY | Facility: CLINIC | Age: 27
End: 2022-09-12
Attending: PSYCHIATRY & NEUROLOGY
Payer: MEDICARE

## 2022-09-12 VITALS
WEIGHT: 215.2 LBS | TEMPERATURE: 98 F | RESPIRATION RATE: 16 BRPM | SYSTOLIC BLOOD PRESSURE: 112 MMHG | HEART RATE: 64 BPM | OXYGEN SATURATION: 98 % | DIASTOLIC BLOOD PRESSURE: 72 MMHG

## 2022-09-12 DIAGNOSIS — G35 MULTIPLE SCLEROSIS (H): Primary | ICD-10-CM

## 2022-09-12 DIAGNOSIS — E55.9 VITAMIN D DEFICIENCY: ICD-10-CM

## 2022-09-12 DIAGNOSIS — G35 MS (MULTIPLE SCLEROSIS) (H): ICD-10-CM

## 2022-09-12 LAB
ALBUMIN SERPL BCG-MCNC: 4.1 G/DL (ref 3.5–5.2)
ALP SERPL-CCNC: 65 U/L (ref 40–129)
ALT SERPL W P-5'-P-CCNC: 19 U/L (ref 10–50)
AST SERPL W P-5'-P-CCNC: 23 U/L (ref 10–50)
BASOPHILS # BLD AUTO: 0 10E3/UL (ref 0–0.2)
BASOPHILS NFR BLD AUTO: 0 %
BILIRUB DIRECT SERPL-MCNC: <0.2 MG/DL (ref 0–0.3)
BILIRUB SERPL-MCNC: 0.2 MG/DL
CD19 CELLS # BLD: 3 CELLS/UL (ref 107–698)
CD19 CELLS NFR BLD: <1 % (ref 6–27)
DEPRECATED CALCIDIOL+CALCIFEROL SERPL-MC: 22 UG/L (ref 20–75)
EOSINOPHIL # BLD AUTO: 0.1 10E3/UL (ref 0–0.7)
EOSINOPHIL NFR BLD AUTO: 2 %
ERYTHROCYTE [DISTWIDTH] IN BLOOD BY AUTOMATED COUNT: 12.3 % (ref 10–15)
HCT VFR BLD AUTO: 43.6 % (ref 40–53)
HGB BLD-MCNC: 14.8 G/DL (ref 13.3–17.7)
IMM GRANULOCYTES # BLD: 0 10E3/UL
IMM GRANULOCYTES NFR BLD: 0 %
LYMPHOCYTES # BLD AUTO: 1.8 10E3/UL (ref 0.8–5.3)
LYMPHOCYTES NFR BLD AUTO: 31 %
MCH RBC QN AUTO: 29.7 PG (ref 26.5–33)
MCHC RBC AUTO-ENTMCNC: 33.9 G/DL (ref 31.5–36.5)
MCV RBC AUTO: 87 FL (ref 78–100)
MONOCYTES # BLD AUTO: 0.7 10E3/UL (ref 0–1.3)
MONOCYTES NFR BLD AUTO: 12 %
NEUTROPHILS # BLD AUTO: 3.2 10E3/UL (ref 1.6–8.3)
NEUTROPHILS NFR BLD AUTO: 55 %
NRBC # BLD AUTO: 0 10E3/UL
NRBC BLD AUTO-RTO: 0 /100
PLATELET # BLD AUTO: 205 10E3/UL (ref 150–450)
PROT SERPL-MCNC: 6.2 G/DL (ref 6.4–8.3)
RBC # BLD AUTO: 4.99 10E6/UL (ref 4.4–5.9)
WBC # BLD AUTO: 5.8 10E3/UL (ref 4–11)

## 2022-09-12 PROCEDURE — 96375 TX/PRO/DX INJ NEW DRUG ADDON: CPT

## 2022-09-12 PROCEDURE — 96366 THER/PROPH/DIAG IV INF ADDON: CPT

## 2022-09-12 PROCEDURE — 82306 VITAMIN D 25 HYDROXY: CPT | Performed by: PSYCHIATRY & NEUROLOGY

## 2022-09-12 PROCEDURE — 36415 COLL VENOUS BLD VENIPUNCTURE: CPT

## 2022-09-12 PROCEDURE — 96365 THER/PROPH/DIAG IV INF INIT: CPT

## 2022-09-12 PROCEDURE — 250N000013 HC RX MED GY IP 250 OP 250 PS 637: Performed by: PSYCHIATRY & NEUROLOGY

## 2022-09-12 PROCEDURE — 258N000003 HC RX IP 258 OP 636: Performed by: PSYCHIATRY & NEUROLOGY

## 2022-09-12 PROCEDURE — 250N000011 HC RX IP 250 OP 636: Performed by: PSYCHIATRY & NEUROLOGY

## 2022-09-12 PROCEDURE — 86769 SARS-COV-2 COVID-19 ANTIBODY: CPT

## 2022-09-12 PROCEDURE — 86355 B CELLS TOTAL COUNT: CPT | Performed by: PSYCHIATRY & NEUROLOGY

## 2022-09-12 PROCEDURE — 82040 ASSAY OF SERUM ALBUMIN: CPT

## 2022-09-12 PROCEDURE — 999N000248 HC STATISTIC IV INSERT WITH US BY RN

## 2022-09-12 PROCEDURE — 85025 COMPLETE CBC W/AUTO DIFF WBC: CPT

## 2022-09-12 RX ORDER — HEPARIN SODIUM,PORCINE 10 UNIT/ML
5 VIAL (ML) INTRAVENOUS
Status: CANCELLED | OUTPATIENT
Start: 2023-02-10

## 2022-09-12 RX ORDER — DIPHENHYDRAMINE HCL 25 MG
50 CAPSULE ORAL ONCE
Status: CANCELLED | OUTPATIENT
Start: 2023-02-10

## 2022-09-12 RX ORDER — MEPERIDINE HYDROCHLORIDE 25 MG/ML
25 INJECTION INTRAMUSCULAR; INTRAVENOUS; SUBCUTANEOUS EVERY 30 MIN PRN
Status: CANCELLED | OUTPATIENT
Start: 2023-02-10

## 2022-09-12 RX ORDER — EPINEPHRINE 1 MG/ML
0.3 INJECTION, SOLUTION INTRAMUSCULAR; SUBCUTANEOUS EVERY 5 MIN PRN
Status: CANCELLED | OUTPATIENT
Start: 2023-02-10

## 2022-09-12 RX ORDER — ALBUTEROL SULFATE 0.83 MG/ML
2.5 SOLUTION RESPIRATORY (INHALATION)
Status: CANCELLED | OUTPATIENT
Start: 2023-02-10

## 2022-09-12 RX ORDER — METHYLPREDNISOLONE SODIUM SUCCINATE 125 MG/2ML
125 INJECTION, POWDER, LYOPHILIZED, FOR SOLUTION INTRAMUSCULAR; INTRAVENOUS ONCE
Status: COMPLETED | OUTPATIENT
Start: 2022-09-12 | End: 2022-09-12

## 2022-09-12 RX ORDER — NALOXONE HYDROCHLORIDE 0.4 MG/ML
0.2 INJECTION, SOLUTION INTRAMUSCULAR; INTRAVENOUS; SUBCUTANEOUS
Status: CANCELLED | OUTPATIENT
Start: 2023-02-10

## 2022-09-12 RX ORDER — HEPARIN SODIUM (PORCINE) LOCK FLUSH IV SOLN 100 UNIT/ML 100 UNIT/ML
5 SOLUTION INTRAVENOUS
Status: CANCELLED | OUTPATIENT
Start: 2023-02-10

## 2022-09-12 RX ORDER — METHYLPREDNISOLONE SODIUM SUCCINATE 125 MG/2ML
125 INJECTION, POWDER, LYOPHILIZED, FOR SOLUTION INTRAMUSCULAR; INTRAVENOUS ONCE
Status: CANCELLED | OUTPATIENT
Start: 2023-02-10

## 2022-09-12 RX ORDER — DIPHENHYDRAMINE HCL 25 MG
50 CAPSULE ORAL ONCE
Status: COMPLETED | OUTPATIENT
Start: 2022-09-12 | End: 2022-09-12

## 2022-09-12 RX ORDER — METHYLPREDNISOLONE SODIUM SUCCINATE 125 MG/2ML
125 INJECTION, POWDER, LYOPHILIZED, FOR SOLUTION INTRAMUSCULAR; INTRAVENOUS
Status: CANCELLED
Start: 2023-02-10

## 2022-09-12 RX ORDER — ACETAMINOPHEN 325 MG/1
650 TABLET ORAL ONCE
Status: CANCELLED | OUTPATIENT
Start: 2023-02-10

## 2022-09-12 RX ORDER — DIPHENHYDRAMINE HYDROCHLORIDE 50 MG/ML
50 INJECTION INTRAMUSCULAR; INTRAVENOUS
Status: CANCELLED
Start: 2023-02-10

## 2022-09-12 RX ORDER — ACETAMINOPHEN 325 MG/1
650 TABLET ORAL ONCE
Status: COMPLETED | OUTPATIENT
Start: 2022-09-12 | End: 2022-09-12

## 2022-09-12 RX ORDER — ALBUTEROL SULFATE 90 UG/1
1-2 AEROSOL, METERED RESPIRATORY (INHALATION)
Status: CANCELLED
Start: 2023-02-10

## 2022-09-12 RX ADMIN — METHYLPREDNISOLONE SODIUM SUCCINATE 125 MG: 125 INJECTION, POWDER, FOR SOLUTION INTRAMUSCULAR; INTRAVENOUS at 08:16

## 2022-09-12 RX ADMIN — ACETAMINOPHEN 650 MG: 325 TABLET ORAL at 08:07

## 2022-09-12 RX ADMIN — DIPHENHYDRAMINE HYDROCHLORIDE 50 MG: 25 CAPSULE ORAL at 08:07

## 2022-09-12 RX ADMIN — OCRELIZUMAB 600 MG: 300 INJECTION INTRAVENOUS at 08:36

## 2022-09-12 NOTE — PROGRESS NOTES
Infusion Nursing Note:  Guzman Davidson presents today for Ocrevus q6mo.    Patient seen by provider today: No   present during visit today: Not Applicable.    Note:   -Premedications: Tylenol, Benadryl, Solu-Medrol  -Patient qualifies for rapid infusion today. Ocrevus started at 100 mL/hr for the first 15 minutes, then increase to 200 mL/hr for the next 15 minutes, then increase to 250 mL/hr for the next 30 minutes, then increase to 300 mL/hr for the remaining 60 minutes    Intravenous Access:  Labs drawn without difficulty.  Peripheral IV placed.    Treatment Conditions:  Biological Infusion Checklist:  ~~~ NOTE: If the patient answers yes to any of the questions below, hold the infusion and contact ordering provider or on-call provider.    1. Have you recently had an elevated temperature, fever, chills, productive cough, coughing for 3 weeks or longer or hemoptysis, abnormal vital signs, night sweats,  chest pain or have you noticed a decrease in your appetite, unexplained weight loss or fatigue? No  2. Do you have any open wounds or new incisions? No  3. Do you have any recent or upcoming hospitalizations, surgeries or dental procedures? No  4. Do you currently have or recently have had any signs of illness or infection or are you on any antibiotics? No  5. Have you had any new, sudden or worsening abdominal pain? No  6. Have you or anyone in your household received a live vaccination in the past 4 weeks? Please note:  No live vaccines while on biologic/chemotherapy until 6 months after the last treatment.  Patient can receive the flu vaccine (shot only) and the pneumovax.  It is optimal for the patient to get these vaccines mid cycle, but they can be given at any time as long as it is not on the day of the infusion. No  7. Have you recently been diagnosed with any new nervous system diseases (ie. Multiple sclerosis, Guillain [place], seizures, neurological changes) or cancer diagnosis? No  8. Are you on  any form of radiation or chemotherapy? No  9. Are you pregnant or breast feeding or do you have plans of pregnancy in the future? No  10. Have you been having any signs of worsening depression or suicidal ideations?  (benlysta only) No  11. Have there been any other new onset medical symptoms? No    Post Infusion Assessment:  Patient tolerated infusion without incident.  Patient observed for 30 minutes post infusion. Pt did not stay for the last 30 minutes of observation - AMA form signed.   Site patent and intact, free from redness, edema or discomfort.  No evidence of extravasations.  Access discontinued per protocol.  Biologic Infusion Post Education: Call the triage nurse at your clinic or seek medical attention if you have chills and/or temperature greater than or equal to 100.5, uncontrolled nausea/vomiting, diarrhea, constipation, dizziness, shortness of breath, chest pain, heart palpitations, weakness or any other new or concerning symptoms, questions or concerns.  You cannot have any live virus vaccines prior to or during treatment or up to 6 months post infusion.  If you have an upcoming surgery, medical procedure or dental procedure during treatment, this should be discussed with your ordering physician and your surgeon/dentist.  If you are having any concerning symptom, if you are unsure if you should get your next infusion or wish to speak to a provider before your next infusion, please call your care coordinator or triage nurse at your clinic to notify them so we can adequately serve you.     Discharge Plan:   AVS to patient via [employer].  Patient will return in 6 months for next appointment, message sent to   Patient discharged in stable condition accompanied by: friend.  Departure Mode: Ambulatory.    Jena Álvarez RN    /75 (BP Location: Left arm)   Pulse 68   Temp 98  F (36.7  C) (Oral)   Resp 16   Wt 97.6 kg (215 lb 3.2 oz)   SpO2 98%     Administrations This Visit      acetaminophen (TYLENOL) tablet 650 mg     Admin Date  09/12/2022 Action  Given Dose  650 mg Route  Oral Administered By  Jena Álvarez RN          diphenhydrAMINE (BENADRYL) capsule 50 mg     Admin Date  09/12/2022 Action  Given Dose  50 mg Route  Oral Administered By  Jena Álvarez RN          methylPREDNISolone sodium succinate (solu-MEDROL) injection 125 mg     Admin Date  09/12/2022 Action  Given Dose  125 mg Route  Intravenous Administered By  Jena Álvarez RN          ocrelizumab (OCREVUS) 600 mg in sodium chloride 0.9 % 500 mL infusion     Admin Date  09/12/2022 Action  New Bag Dose  600 mg Route  Intravenous Administered By  Jena Álvarez RN           Admin Date  09/12/2022 Action  Restarted Dose   Route  Intravenous Administered By  Jena Álvarez RN

## 2022-09-12 NOTE — PATIENT INSTRUCTIONS
Dear Guzman Davidson    Thank you for choosing [facility] for your infusion.  The following information is a summary of our appointment as well as important reminders.      We look forward in seeing you on your next appointment here at [facility]  Please don t hesitate to call us at [phone] to reschedule any of your appointments or to speak with one of the [facility] registered nurses.  It was a pleasure taking care of you today.    Sincerely,    [facility]  [address] [place] [address]  [id number]  Phone:  [phone]

## 2022-09-12 NOTE — LETTER
9/12/2022         RE: Guzman Davidson  [address]  [address]  [facility] [address]        Dear Colleague,    Thank you for referring your patient, Guzman Davidson, to the [facility]. Please see a copy of my visit note below.    Infusion Nursing Note:  Guzman Davidson presents today for Ocrevus q6mo.    Patient seen by provider today: No   present during visit today: Not Applicable.    Note:   -Premedications: Tylenol, Benadryl, Solu-Medrol  -Patient qualifies for rapid infusion today. Ocrevus started at 100 mL/hr for the first 15 minutes, then increase to 200 mL/hr for the next 15 minutes, then increase to 250 mL/hr for the next 30 minutes, then increase to 300 mL/hr for the remaining 60 minutes    Intravenous Access:  Labs drawn without difficulty.  Peripheral IV placed.    Treatment Conditions:  Biological Infusion Checklist:  ~~~ NOTE: If the patient answers yes to any of the questions below, hold the infusion and contact ordering provider or on-call provider.    1. Have you recently had an elevated temperature, fever, chills, productive cough, coughing for 3 weeks or longer or hemoptysis, abnormal vital signs, night sweats,  chest pain or have you noticed a decrease in your appetite, unexplained weight loss or fatigue? No  2. Do you have any open wounds or new incisions? No  3. Do you have any recent or upcoming hospitalizations, surgeries or dental procedures? No  4. Do you currently have or recently have had any signs of illness or infection or are you on any antibiotics? No  5. Have you had any new, sudden or worsening abdominal pain? No  6. Have you or anyone in your household received a live vaccination in the past 4 weeks? Please note:  No live vaccines while on biologic/chemotherapy until 6 months after the last treatment.  Patient can receive the flu vaccine (shot only) and the pneumovax.  It is optimal for the patient to get these  vaccines mid cycle, but they can be given at any time as long as it is not on the day of the infusion. No  7. Have you recently been diagnosed with any new nervous system diseases (ie. Multiple sclerosis, Guillain [place], seizures, neurological changes) or cancer diagnosis? No  8. Are you on any form of radiation or chemotherapy? No  9. Are you pregnant or breast feeding or do you have plans of pregnancy in the future? No  10. Have you been having any signs of worsening depression or suicidal ideations?  (benlysta only) No  11. Have there been any other new onset medical symptoms? No    Post Infusion Assessment:  Patient tolerated infusion without incident.  Patient observed for 30 minutes post infusion. Pt did not stay for the last 30 minutes of observation - AMA form signed.   Site patent and intact, free from redness, edema or discomfort.  No evidence of extravasations.  Access discontinued per protocol.  Biologic Infusion Post Education: Call the triage nurse at your clinic or seek medical attention if you have chills and/or temperature greater than or equal to 100.5, uncontrolled nausea/vomiting, diarrhea, constipation, dizziness, shortness of breath, chest pain, heart palpitations, weakness or any other new or concerning symptoms, questions or concerns.  You cannot have any live virus vaccines prior to or during treatment or up to 6 months post infusion.  If you have an upcoming surgery, medical procedure or dental procedure during treatment, this should be discussed with your ordering physician and your surgeon/dentist.  If you are having any concerning symptom, if you are unsure if you should get your next infusion or wish to speak to a provider before your next infusion, please call your care coordinator or triage nurse at your clinic to notify them so we can adequately serve you.     Discharge Plan:   AVS to patient via [employer].  Patient will return in 6 months for next appointment, message sent to    Patient discharged in stable condition accompanied by: friend.  Departure Mode: Ambulatory.    Jena Álvarez RN    /75 (BP Location: Left arm)   Pulse 68   Temp 98  F (36.7  C) (Oral)   Resp 16   Wt 97.6 kg (215 lb 3.2 oz)   SpO2 98%     Administrations This Visit     acetaminophen (TYLENOL) tablet 650 mg     Admin Date  09/12/2022 Action  Given Dose  650 mg Route  Oral Administered By  Jena Álvarez RN          diphenhydrAMINE (BENADRYL) capsule 50 mg     Admin Date  09/12/2022 Action  Given Dose  50 mg Route  Oral Administered By  Jena Álvarez RN          methylPREDNISolone sodium succinate (solu-MEDROL) injection 125 mg     Admin Date  09/12/2022 Action  Given Dose  125 mg Route  Intravenous Administered By  Jena Álvarez RN          ocrelizumab (OCREVUS) 600 mg in sodium chloride 0.9 % 500 mL infusion     Admin Date  09/12/2022 Action  New Bag Dose  600 mg Route  Intravenous Administered By  Jena Álvarez RN           Admin Date  09/12/2022 Action  Restarted Dose   Route  Intravenous Administered By  Jena Álvarez RN                                  Again, thank you for allowing me to participate in the care of your patient.        Sincerely,        [facility]

## 2022-09-13 LAB
SARS-COV-2 AB SERPL IA-ACNC: 5.1 U/ML
SARS-COV-2 AB SERPL-IMP: POSITIVE

## 2022-09-28 ENCOUNTER — TELEPHONE (OUTPATIENT)
Dept: NEUROLOGY | Facility: CLINIC | Age: 27
End: 2022-09-28

## 2022-09-28 RX ORDER — METHYLPREDNISOLONE SODIUM SUCCINATE 125 MG/2ML
125 INJECTION, POWDER, LYOPHILIZED, FOR SOLUTION INTRAMUSCULAR; INTRAVENOUS ONCE
Status: CANCELLED | OUTPATIENT
Start: 2022-09-28

## 2022-09-28 RX ORDER — ALBUTEROL SULFATE 90 UG/1
1-2 AEROSOL, METERED RESPIRATORY (INHALATION)
Status: CANCELLED
Start: 2022-09-28

## 2022-09-28 RX ORDER — HEPARIN SODIUM (PORCINE) LOCK FLUSH IV SOLN 100 UNIT/ML 100 UNIT/ML
5 SOLUTION INTRAVENOUS
Status: CANCELLED | OUTPATIENT
Start: 2022-09-28

## 2022-09-28 RX ORDER — DIPHENHYDRAMINE HCL 25 MG
50 CAPSULE ORAL ONCE
Status: CANCELLED | OUTPATIENT
Start: 2022-09-28

## 2022-09-28 RX ORDER — EPINEPHRINE 1 MG/ML
0.3 INJECTION, SOLUTION, CONCENTRATE INTRAVENOUS EVERY 5 MIN PRN
Status: CANCELLED | OUTPATIENT
Start: 2022-09-28

## 2022-09-28 RX ORDER — ACETAMINOPHEN 325 MG/1
650 TABLET ORAL ONCE
Status: CANCELLED | OUTPATIENT
Start: 2022-09-28

## 2022-09-28 RX ORDER — DIPHENHYDRAMINE HYDROCHLORIDE 50 MG/ML
50 INJECTION INTRAMUSCULAR; INTRAVENOUS
Status: CANCELLED
Start: 2022-09-28

## 2022-09-28 RX ORDER — ALBUTEROL SULFATE 0.83 MG/ML
2.5 SOLUTION RESPIRATORY (INHALATION)
Status: CANCELLED | OUTPATIENT
Start: 2022-09-28

## 2022-09-28 RX ORDER — METHYLPREDNISOLONE SODIUM SUCCINATE 125 MG/2ML
125 INJECTION, POWDER, LYOPHILIZED, FOR SOLUTION INTRAMUSCULAR; INTRAVENOUS
Status: CANCELLED
Start: 2022-09-28

## 2022-09-28 RX ORDER — HEPARIN SODIUM,PORCINE 10 UNIT/ML
5 VIAL (ML) INTRAVENOUS
Status: CANCELLED | OUTPATIENT
Start: 2022-09-28

## 2022-09-28 NOTE — TELEPHONE ENCOUNTER
Guzman will be due for his next Ocrevus infusion in March; target date of 3/12/23. Ordered pended to Dr. Gross for signature. Once signed, a message will be sent to [facility] so they can assist Guzman with scheduling.    Akanksha Gupta RN

## 2022-10-03 ENCOUNTER — HEALTH MAINTENANCE LETTER (OUTPATIENT)
Age: 27
End: 2022-10-03

## 2023-02-11 ENCOUNTER — HEALTH MAINTENANCE LETTER (OUTPATIENT)
Age: 28
End: 2023-02-11

## 2023-02-19 ENCOUNTER — NURSE TRIAGE (OUTPATIENT)
Dept: NURSING | Facility: CLINIC | Age: 28
End: 2023-02-19
Payer: MEDICARE

## 2023-02-19 NOTE — TELEPHONE ENCOUNTER
Nurse Triage SBAR    Is this a 2nd Level Triage? NO    Situation/background: Patient having flank pain.     Assessment: Patient rates his pain a constant 3/10, but in the mornings it is at a 5/10. When he feels the urge to urinate, it takes him 20 minutes for the urine to start coming out. Does feel some light burning while urinating. He does not have a thermometer so unable to check for fever.    Protocol Recommended Disposition:   See HCP Within 4 Hours (Or PCP Triage)    Recommendation: Advised [facility]. Patient says he will call his insurance and see if he can get a medical ride. Discussed when to call us back. Patient verbalizes understanding.     Julian Cadena RN on 2/19/2023 at 1:02 PM    Reason for Disposition    Pain or burning with passing urine (urination)    Additional Information    Negative: Passed out (i.e., lost consciousness, collapsed and was not responding)    Negative: Shock suspected (e.g., cold/pale/clammy skin, too weak to stand, low BP, rapid pulse)    Negative: Difficult to awaken or acting confused (e.g., disoriented, slurred speech)    Negative: Sounds like a life-threatening emergency to the triager    Negative: [1] SEVERE pain (e.g., excruciating, scale 8-10) AND [2] present > 1 hour    Negative: [1] SEVERE pain (e.g., excruciating, scale 8-10) AND [2] not improved after pain medicine    Negative: [1] Sudden onset of severe flank pain AND [2] age > 60 years    Negative: [1] Abdominal pain AND [2] age > 60 years    Negative: [1] Unable to urinate (or only a few drops) > 4 hours AND [2] bladder feels very full (e.g., palpable bladder or strong urge to urinate)    Negative: Vomiting    Negative: Weakness of a leg or foot (e.g., unable to bear weight, dragging foot)    Negative: Patient sounds very sick or weak to the triager    Negative: Fever > 100.4 F (38.0 C)     No thermometer    Protocols used: FLANK PAIN-A-AH

## 2023-03-08 NOTE — TELEPHONE ENCOUNTER
Left [facility] for Guzman, letting him know that he has an Ocrevus infusion scheduled at [facility] on 3/17 at 0700.    Akanksha Gupta RN

## 2023-03-17 ENCOUNTER — PATIENT OUTREACH (OUTPATIENT)
Dept: CARE COORDINATION | Facility: CLINIC | Age: 28
End: 2023-03-17
Payer: MEDICARE

## 2023-03-17 ENCOUNTER — INFUSION THERAPY VISIT (OUTPATIENT)
Dept: INFUSION THERAPY | Facility: CLINIC | Age: 28
End: 2023-03-17
Attending: PSYCHIATRY & NEUROLOGY
Payer: MEDICARE

## 2023-03-17 VITALS
DIASTOLIC BLOOD PRESSURE: 77 MMHG | OXYGEN SATURATION: 98 % | SYSTOLIC BLOOD PRESSURE: 112 MMHG | RESPIRATION RATE: 16 BRPM | WEIGHT: 231.7 LBS | HEART RATE: 60 BPM

## 2023-03-17 DIAGNOSIS — G35 MULTIPLE SCLEROSIS (H): Primary | ICD-10-CM

## 2023-03-17 DIAGNOSIS — K59.00 CONSTIPATION, UNSPECIFIED CONSTIPATION TYPE: Primary | ICD-10-CM

## 2023-03-17 PROCEDURE — 258N000003 HC RX IP 258 OP 636: Performed by: PSYCHIATRY & NEUROLOGY

## 2023-03-17 PROCEDURE — 96375 TX/PRO/DX INJ NEW DRUG ADDON: CPT

## 2023-03-17 PROCEDURE — 999N000248 HC STATISTIC IV INSERT WITH US BY RN

## 2023-03-17 PROCEDURE — 96366 THER/PROPH/DIAG IV INF ADDON: CPT

## 2023-03-17 PROCEDURE — 250N000011 HC RX IP 250 OP 636: Performed by: PSYCHIATRY & NEUROLOGY

## 2023-03-17 PROCEDURE — 999N000128 HC STATISTIC PERIPHERAL IV START W/O US GUIDANCE

## 2023-03-17 PROCEDURE — 96365 THER/PROPH/DIAG IV INF INIT: CPT

## 2023-03-17 PROCEDURE — 250N000013 HC RX MED GY IP 250 OP 250 PS 637: Performed by: PSYCHIATRY & NEUROLOGY

## 2023-03-17 RX ORDER — ALBUTEROL SULFATE 0.83 MG/ML
2.5 SOLUTION RESPIRATORY (INHALATION)
OUTPATIENT
Start: 2023-09-13

## 2023-03-17 RX ORDER — ACETAMINOPHEN 325 MG/1
650 TABLET ORAL ONCE
Status: COMPLETED | OUTPATIENT
Start: 2023-03-17 | End: 2023-03-17

## 2023-03-17 RX ORDER — EPINEPHRINE 1 MG/ML
0.3 INJECTION, SOLUTION INTRAMUSCULAR; SUBCUTANEOUS EVERY 5 MIN PRN
OUTPATIENT
Start: 2023-09-13

## 2023-03-17 RX ORDER — ALBUTEROL SULFATE 90 UG/1
1-2 AEROSOL, METERED RESPIRATORY (INHALATION)
Start: 2023-09-13

## 2023-03-17 RX ORDER — DIPHENHYDRAMINE HYDROCHLORIDE 50 MG/ML
50 INJECTION INTRAMUSCULAR; INTRAVENOUS
Start: 2023-09-13

## 2023-03-17 RX ORDER — ACETAMINOPHEN 325 MG/1
650 TABLET ORAL ONCE
Status: CANCELLED | OUTPATIENT
Start: 2023-09-13

## 2023-03-17 RX ORDER — DIPHENHYDRAMINE HCL 25 MG
50 CAPSULE ORAL ONCE
Status: CANCELLED | OUTPATIENT
Start: 2023-09-13

## 2023-03-17 RX ORDER — METHYLPREDNISOLONE SODIUM SUCCINATE 125 MG/2ML
125 INJECTION, POWDER, LYOPHILIZED, FOR SOLUTION INTRAMUSCULAR; INTRAVENOUS ONCE
Status: COMPLETED | OUTPATIENT
Start: 2023-03-17 | End: 2023-03-17

## 2023-03-17 RX ORDER — METHYLPREDNISOLONE SODIUM SUCCINATE 125 MG/2ML
125 INJECTION, POWDER, LYOPHILIZED, FOR SOLUTION INTRAMUSCULAR; INTRAVENOUS
Start: 2023-09-13

## 2023-03-17 RX ORDER — ASPIRIN 81 MG
TABLET, DELAYED RELEASE (ENTERIC COATED) ORAL
Qty: 30 TABLET | Refills: 0 | Status: SHIPPED | OUTPATIENT
Start: 2023-03-17

## 2023-03-17 RX ORDER — HEPARIN SODIUM,PORCINE 10 UNIT/ML
5 VIAL (ML) INTRAVENOUS
OUTPATIENT
Start: 2023-09-13

## 2023-03-17 RX ORDER — DIPHENHYDRAMINE HCL 25 MG
50 CAPSULE ORAL ONCE
Status: COMPLETED | OUTPATIENT
Start: 2023-03-17 | End: 2023-03-17

## 2023-03-17 RX ORDER — HEPARIN SODIUM (PORCINE) LOCK FLUSH IV SOLN 100 UNIT/ML 100 UNIT/ML
5 SOLUTION INTRAVENOUS
OUTPATIENT
Start: 2023-09-13

## 2023-03-17 RX ORDER — METHYLPREDNISOLONE SODIUM SUCCINATE 125 MG/2ML
125 INJECTION, POWDER, LYOPHILIZED, FOR SOLUTION INTRAMUSCULAR; INTRAVENOUS ONCE
Status: CANCELLED | OUTPATIENT
Start: 2023-09-13

## 2023-03-17 RX ADMIN — METHYLPREDNISOLONE SODIUM SUCCINATE 125 MG: 125 INJECTION, POWDER, FOR SOLUTION INTRAMUSCULAR; INTRAVENOUS at 07:43

## 2023-03-17 RX ADMIN — DIPHENHYDRAMINE HYDROCHLORIDE 50 MG: 25 CAPSULE ORAL at 07:43

## 2023-03-17 RX ADMIN — OCRELIZUMAB 600 MG: 300 INJECTION INTRAVENOUS at 07:58

## 2023-03-17 RX ADMIN — ACETAMINOPHEN 650 MG: 325 TABLET ORAL at 07:42

## 2023-03-17 NOTE — PROGRESS NOTES
Social Work Intervention   Health Clinics    Data/Intervention:    Patient Name:  Guzman Davidson  /Age:  1995 (27 year old)    Visit Type: telephone  Referral Source: Infusion Center  Reason for Referral:  Can't afford medications    Collaborated With:    -Guzman- [phone]    Psychosocial Information/Concerns:  Message received from Infusion Center RN Marilin reporting that Guzman was seen in clinic today and reported he is not taking any of his medications because he can't afford them and can't even afford a multivitamin.   Marilin also reported that Guzman now has a home and is no longer homeless.     Intervention/Education/Resources Provided:  I spoke with Guzman and he was still in the infusion center but ok with talking and said he is doing ok.   Guzman reported he doesn't take vitamins because he can't afford them and they are never covered by insurance. Guzman said he does not take his mental health medication, and hasn't been for the past 6 months, because he doesn't like the way it makes him feel and he hasn't been able to find the right medication yet. Guzman reported he would rather deal with some pain and be able to be productive. Guzman reported feeling his mental health symptoms have been ok without medications and said that without the medications he has trouble finding words or ideas sometimes, but when on mental health medication he has a hard time getting out of bed, feels lazy, and has a tendency to fixate on things he can't fix. Guzman said that overall, the symptoms are worse for him when on medication and said that without mental health medications he is able to feel emotions and get over it. Guzman reported he has a mental health therapist he sees and noted this person is supportive of what he is and is not taking medication matt.     Guzman reported he would like to be back on his vitamins (vitamin D, fish oil, multi vitamin) and said he would like something for skin health as well. Guzman  reported his income is SSDI and half of his monthly check goes to rent and almost the rest to food. Guzman said he reapplied for SNAP benefits last week which he really hopes will get approved, and this would free up money for him to use for vitamins. I explained I will see what I can find in terms of options for ways to get vitamins for cheaper and Guzman asked that I call him with any information.     Guzman denied further SW needs or questions at this time.    Guzman then had to end the visit because a vascular access staff person arrived to adjust his IV in the infusion center.     I updated VALENTÍN Gaston and Dr. Gross as to my conversation with Guzman regarding medications.     I asked Dr. Gross if vitamins in Guzman's med list (and one for skin health/rashes) would be medically recommended for Guzman, and if so I indicated they could be officially prescribed which would help getting Medicare to cover them. Awaiting response before updating Guzman.       Assessment/Plan:  I will explore ways to get cheaper cost vitamins and update Guzman with what I find.     Provided patient/family with contact information and availability.    ADIS Burt, [facility]    [facility]  Ph: [phone], Pgr: [phone]  3/17/2023

## 2023-03-17 NOTE — PROGRESS NOTES
Nursing Note  Guzman Davidson [facility] for:   Chief Complaint   Patient presents with     Infusion     Ocrevus       During today's [facility] appointment, orders from Dr. Gross were completed.  Frequency: every 6 months    Progress note:  Patient identification verified by name and date of birth.  Assessment completed.  Vitals recorded in Doc Flowsheets.  Patient was provided with education regarding medication/procedure and possible side effects.  Patient verbalized understanding.     present during visit today: Not Applicable.    Treatment Conditions: ~~~ NOTE: If the patient answers yes to any of the questions below, hold the infusion and contact ordering provider or on-call provider.    1. Have you recently had an elevated temperature, fever, chills, productive cough, coughing for 3 weeks or longer or hemoptysis, abnormal vital signs, night sweats,  chest pain or have you noticed a decrease in your appetite, unexplained weight loss or fatigue? No  2. Do you have any open wounds or new incisions? Yes, area between fingers open area, healing  3. Do you have any recent or upcoming hospitalizations, surgeries or dental procedures? No  4. Do you currently have or recently have had any signs of illness or infection or are you on any antibiotics? Yes, chest congestion, breathing in allergens  5. Have you had any new, sudden or worsening abdominal pain? No  6. Have you or anyone in your household received a live vaccination in the past 4 weeks? Please note:  No live vaccines while on biologic/chemotherapy until 6 months after the last treatment.  Patient can receive the flu vaccine (shot only) and the pneumovax.  It is optimal for the patient to get these vaccines mid cycle, but they can be given at any time as long as it is not on the day of the infusion. No  7. Have you recently been diagnosed with any new nervous system  diseases (ie. Multiple sclerosis, Guillain Bennett, seizures, neurological changes) or cancer diagnosis? No  8. Are you on any form of radiation or chemotherapy? No  9. Are you pregnant or breast feeding or do you have plans of pregnancy in the future? No  10. Have you been having any signs of worsening depression or suicidal ideations?  (benlysta only) No  11. Have there been any other new onset medical symptoms? Yes, chest congestion due to allergens    Premedications: administered per order.    Drug Waste Record: No    Infusion length and rate:  Rapid rate:    100 ml/hr x 15 minutes  200 ml/hr x 15 minutes  250 ml/hr x 30 minutes  30 ml/hr for remaining hour    Patient refused one hour observation period and signed AMA form. Patient understands the need to go to the ED if symptoms arise.     Labs: were not ordered for this appointment.    Vascular access: peripheral IV was placed by vascular access nurse.    Is the next appt scheduled? No, message sent to scheduling team.    Post Infusion Assessment:  Patient tolerated infusion without incident.     Discharge Plan:   Follow up plan of care with: ongoing infusions at [facility]. and ordering provider as scheduled.  Discharge instructions were reviewed with patient.  Patient/representative verbalized understanding of discharge instructions and all questions answered.  Patient discharged from [facility] in stable condition.    Shalonda Kumar RN       Administrations This Visit     acetaminophen (TYLENOL) tablet 650 mg     Admin Date  03/17/2023 Action  $Given Dose  650 mg Route  Oral Administered By  Shalonda Kumar RN          diphenhydrAMINE (BENADRYL) capsule 50 mg     Admin Date  03/17/2023 Action  $Given Dose  50 mg Route  Oral Administered By  Shalonda Kumar, VALENTÍN          methylPREDNISolone sodium succinate (solu-MEDROL) injection 125 mg     Admin Date  03/17/2023 Action  $Given Dose  125 mg  Route  Intravenous Administered By  Shalonda Kumar, RN          ocrelizumab (OCREVUS) 600 mg in sodium chloride 0.9 % 500 mL infusion     Admin Date  03/17/2023 Action  $New Bag Dose  600 mg Route  Intravenous Administered By  Shalonda Kumar, RN                /72 (BP Location: Left arm, Patient Position: Sitting, Cuff Size: Adult Large)   Pulse 71   Resp 16   Wt 105.1 kg (231 lb 11.2 oz)   SpO2 98%

## 2023-03-22 ENCOUNTER — PATIENT OUTREACH (OUTPATIENT)
Dept: CARE COORDINATION | Facility: CLINIC | Age: 28
End: 2023-03-22
Payer: MEDICARE

## 2023-03-22 NOTE — PROGRESS NOTES
Social Work Follow-Up  [facility]    Data/Intervention:  Patient Name:  Guzman Davidson  /Age:  1995 (27 year old)    Reason for Follow-Up:  Concerns about cost of vitamins    Collaborated With:    -Guzman- [phone]  -Dr. Gross and his RN Angie    Intervention/Education/Resources Provided:  I followed up with Guzman regarding ways to obtain vitamins for a cheaper cruz. Guzman reported he just got a job so is doing really well!    I explained having a message out to Dr. Gross regarding writing an official prescription for vitamins as this would help with insurance coverage and Guzman was very appreciative of this. Guzman also said that if he can't find a way to afford the vitamins he will focus on getting them through various foods. I asked if Guzman would be interested in meeting with a Dietician to go over his diet and he said when he was first diagnosed with MS he spent so much time in the hospital so isn't interested in this. When I explained that seeing a Dietician wouldn't involve the hospital Guzman said he was open to it then. I explained I will request a referral.   Guzman asked that he be called if a prescription is sent in for vitamins as he doesn't have a way to check My Chart.     Guzman asked for help establishing a PCP and I provided him with the [facility] phone number.     Guzman denied having further SW needs or questions at this time.     I sent a message to Dr. Gross and his RN Angie explaining that if vitamins are medically recommended for Guzman, a prescription could be sent to Guzman's pharmacy and this could help with insurance coverage. I also asked that Angie call Guzman to notify him if a prescription is submitted as he doesn't have My Chart access.   I also asked for a Dietician referral for Guzman.     Assessment/Plan:  No follow up is planned on my part. I will remain available if further assistance is needed.     Previously provided patient/family with writer's  contact information and availability.       ADIS Burt, [facility]    [facility]  Ph: [phone], Pgr: [phone]  3/22/2023

## 2023-05-25 ENCOUNTER — OFFICE VISIT (OUTPATIENT)
Dept: NEUROLOGY | Facility: CLINIC | Age: 28
End: 2023-05-25
Attending: PSYCHIATRY & NEUROLOGY
Payer: MEDICARE

## 2023-05-25 VITALS
WEIGHT: 215.2 LBS | HEART RATE: 66 BPM | OXYGEN SATURATION: 95 % | SYSTOLIC BLOOD PRESSURE: 109 MMHG | DIASTOLIC BLOOD PRESSURE: 71 MMHG

## 2023-05-25 DIAGNOSIS — E55.9 VITAMIN D DEFICIENCY: ICD-10-CM

## 2023-05-25 DIAGNOSIS — G35 MS (MULTIPLE SCLEROSIS) (H): Primary | ICD-10-CM

## 2023-05-25 PROCEDURE — 99214 OFFICE O/P EST MOD 30 MIN: CPT | Mod: GC | Performed by: PSYCHIATRY & NEUROLOGY

## 2023-05-25 PROCEDURE — G0463 HOSPITAL OUTPT CLINIC VISIT: HCPCS

## 2023-05-25 ASSESSMENT — PAIN SCALES - GENERAL: PAINLEVEL: NO PAIN (0)

## 2023-05-25 NOTE — PATIENT INSTRUCTIONS
Proceed with next Ocrevus infusion in September on or around September 17, 2023: you can call [phone] to schedule    2.   Blood tests on the day of infusion    3.   I would suggest adding 5000 units of vitamin D on a daily basis     4.   MRI scans in 4 months    5.  Return to clinic in 6 months

## 2023-05-25 NOTE — PROGRESS NOTES
Multiple Sclerosis Clinic Visit  5/25/2023    Reason: Multiple Sclerosis     Source of information: Patient and chart review    History of Present Symptom:  Guzman Davidson is a 27 year old male with a PMH significant for tobacco use and multiple sclerosis who presents today for follow up.      His last Ocrevus infusion was on 03/17/2023 without any side effects. Overall, he feels a lot better than before, mostly related to not being homeless. He has not been using gabapentin as he feels his spasticity occurs at night only.  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 He can walk as much as he wants and does not feel fatigued. He feels he has a 'little bit of a wobble' and sometimes will have to catch his step when he is trying to go upstairs or move around somebody mostly when he is distracted. He has not had any double vision. Sometimes he has episodes of b/l blurry vision (like adjusting binoculars and comes back in a few seconds when he focuses) that he noted since March 2023 during his work.    He continues to have issues with inability to climax. Feels part of it might be his choices in partner, related to boredom or stimulation. He has not used viagra. He feels he might need to try for hours and will not climax. He stopped zoloft as soon as it was prescribed and feels he read enough about the medication on the internet. He felt mood swings, increased paranoia and difficulty maintaining erection. Off the medication he feels he might get more tired before he loses his ability to maintain erection.     There was concern for new non-enhancing lesion in the right frontal lobe on MRI in 2020 but this was questioned by his previous neurologist. No comparison available for our review. He is not taking vitamin D, feels he cannot afford multivitamin. He did not get his recent MRI done as he feels he cannot take 1 out his 2 days off a week for an MRI.     Disease onset:  "age 15 with double vision, gait abnormalities, severe vertigo and vomitting.   Most recent relapse: Reports a few minor relapses since initial symptoms. Mainly loss of function of the left leg.   Previous disease modifying therapy:   Glatiramer acetate (intermitent use due to insurance and moving) resulting in subsequent relapse.  Ocrelizumab started 2018, last infusion 3/17/2022     The patient's medical, surgical, social, and family history were personally reviewed with the patient.  No past medical history on file.   No past surgical history on file.  Social History     Tobacco Use     Smoking status: Every Day     Years: 12.00     Types: Cigarettes     Smokeless tobacco: Never   Substance Use Topics     Alcohol use: Yes     Comment: SOCIAL     Drug use: Yes     Frequency: 7.0 times per week     Types: Marijuana     No family history on file.  Current Outpatient Medications   Medication     acetaminophen (TYLENOL) 325 MG tablet     amphetamine-dextroamphetamine (ADDERALL) 10 MG tablet     Cholecalciferol (VITAMIN D-3) 125 MCG (5000 UT) TABS     diazepam (VALIUM) 5 MG tablet     docusate sodium (COLACE) 100 MG tablet     gabapentin (NEURONTIN) 300 MG capsule     multivitamin (ONE-DAILY) tablet     NONFORMULARY     Omega-3 Fish Oil 500 MG capsule     pseudoePHEDrine (SUDAFED) 30 MG tablet     sertraline (ZOLOFT) 50 MG tablet     No current facility-administered medications for this visit.     Allergies   Allergen Reactions     Cocoa Butter Other (See Comments)     Other reaction(s): Other (See Comments)  Skin irritation with older or , fresh is ok   Skin irritation       Corticosteroids Other (See Comments)     Other reaction(s): With long term prednisone usage    Steroids \"homicidal rage.\"  States 'rage'     Sulfa Antibiotics Diarrhea     Bloating, gas  Bloating, gas     Tilactase Diarrhea     Bloating, gas       Physical Examination    General: Patient appears comfortable in no acute distress.   HEENT: " NC/AT, no icterus, moist mucous membranes  Chest: non-labored on RA  Extremities: Warm, no edema  Skin: No rash or lesion   Psych: Affect appropriate for situation   Neuro:  Mental status: Awake, alert, attentive. Language is fluent with intact comprehension.   Cranial nerves: PERRL with no relative afferent pupillary defect, conjugate gaze, EOMI, visual fields intact, face symmetric, shoulder shrug strong, tongue protrusion/uvula midline, no dysarthria.   Motor: Normal muscle bulk and tone. No abnormal movements. 5/5 strength in 4/4 extremities.     R L  Deltoid  5 5  Biceps  5 5  Triceps 5 5  Wrist ext 5 5  Finger ext 5 5  Finger abd 5 5    Hip flexion 5 5  Knee flexion 5 5  Knee ext 5 5  Dorsiflexion 5 5    Reflexes: Brisk reflexes symmetric in biceps, brachioradialis, patellae, and achilles. No clonus, toes down-going.  Sensory: Intact to light touch, pin, vibration, and proprioception. Romberg is negative.   Coordination: FNF and HS without ataxia or dysmetria.    Gait: Wide base gait, stride length. Tandem walk impaired. Some difficulty balancing on right foot.     Laboratory:  2022  CD 19 <1% (absolute 3)  Vit D 10  Imagin2020  MRI brain  Single new inactive demyelinating lesion in the RIGHT frontal periventricular white matter. Otherwise stable demyelinating disease burden compared to the prior MRI of 2019. No actively demyelinating lesions on today's MRI.   MRI C & T spine  There are four inactive demyelinating plaques within the cervical cord and one inactive demyelinating plaque in the thoracic cord. No actively demyelinating lesions within the visualized cervical or thoracic spinal cord. No comparison exams available to evaluate for possible progression.     Assessment/Plan:  Guzman Davidosn is a 27 year old male who presents for follow up for multiple sclerosis. He has been stable on Ocrevus clinically although he has not had imaging recently. He is tolerating the infusions well. He  reports he has mild balance difficulty and no falls. He also endorses sexual dysfunction. We discussed that this is likely related to MS and there are not great medication options. Vibratory stimulation may help. He is not taking Vitamin D for cost concerns for 'multi vitamins'. We discussed that we do not see indication for replacement of other vitamins at this time and he can take Vitamin D 5000 international unit(s) daily and the patient agreed. We discussed that he could potentially schedule his next MRI the same day of his Ocrevus infusion so he does not need to take another day off work.      - MRI Brain and C spine with and without contrast in 4 months  - Ocrevus infusion in 4 months  - Blood work on day of next Ocrevus infusion  - Vitamin D 5000 international unit(s) daily     Return to clinic in 6 months      Patient seen and discussed with Dr. Gross.     I have reviewed the plan with the patient, who is in agreement.    Chapito Bailey  Neurology Resident PGY-2    Attending physician: I saw and evaluated the patient with Dr. Bailey, and I agree with his findings and plan of care as documented above, with the following additions.    The patient is clinically stable with no evidence of active inflammatory demyelination on current disease modifying therapy with ocrelizumab. He will remain on this medication, with the next infusion due on or about 9-. He would like to have follow-up MRI imaging performed on the same date, which is fine, and will order MRI scans of the brain and cervical spine to be performed at that time.    Additionally, on the day of the infusion I will check routine blood tests for monitoring of this medication to include CBC with differential, hepatic panel, and total antibody (IgG) level. Patient relates vague history of having been told to take vitamin B12 previously, and will also check a B12 level to make sure that there is no indication of deficiency of this  vitamin.    I did advise him to take 5000 units of vitamin D daily, and will recheck this value at the time of the next infusion to follow up on known vitamin D deficiency.    Brandon Gross MD   of Neurology  [facility]    Diagnoses:  1) MS  2) Vitamin D deficiency

## 2023-05-25 NOTE — LETTER
5/25/2023      RE: Guzman Davidson  [address] [id number]       Multiple Sclerosis Clinic Visit  5/25/2023    Reason: Multiple Sclerosis     Source of information: Patient and chart review    History of Present Symptom:  Guzman Davidson is a 27 year old male with a PMH significant for tobacco use and multiple sclerosis who presents today for follow up.      His last Ocrevus infusion was on 03/17/2023 without any side effects. Overall, he feels a lot better than before, mostly related to not being homeless. He has not been using gabapentin as he feels his spasticity occurs at night only.  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 He can walk as much as he wants and does not feel fatigued. He feels he has a 'little bit of a wobble' and sometimes will have to catch his step when he is trying to go upstairs or move around somebody mostly when he is distracted. He has not had any double vision. Sometimes he has episodes of b/l blurry vision (like adjusting binoculars and comes back in a few seconds when he focuses) that he noted since March 2023 during his work.    He continues to have issues with inability to climax. Feels part of it might be his choices in partner, related to boredom or stimulation. He has not used viagra. He feels he might need to try for hours and will not climax. He stopped zoloft as soon as it was prescribed and feels he read enough about the medication on the internet. He felt mood swings, increased paranoia and difficulty maintaining erection. Off the medication he feels he might get more tired before he loses his ability to maintain erection.     There was concern for new non-enhancing lesion in the right frontal lobe on MRI in 2020 but this was questioned by his previous neurologist. No comparison available for our review. He is not taking vitamin D, feels he cannot afford multivitamin. He did not get his recent MRI done as  "he feels he cannot take 1 out his 2 days off a week for an MRI.     Disease onset: age 15 with double vision, gait abnormalities, severe vertigo and vomitting.   Most recent relapse: Reports a few minor relapses since initial symptoms. Mainly loss of function of the left leg.   Previous disease modifying therapy:   Glatiramer acetate (intermitent use due to insurance and moving) resulting in subsequent relapse.  Ocrelizumab started 2018, last infusion 3/17/2022     The patient's medical, surgical, social, and family history were personally reviewed with the patient.  No past medical history on file.   No past surgical history on file.  Social History     Tobacco Use     Smoking status: Every Day     Years: 12.00     Types: Cigarettes     Smokeless tobacco: Never   Substance Use Topics     Alcohol use: Yes     Comment: SOCIAL     Drug use: Yes     Frequency: 7.0 times per week     Types: Marijuana     No family history on file.  Current Outpatient Medications   Medication     acetaminophen (TYLENOL) 325 MG tablet     amphetamine-dextroamphetamine (ADDERALL) 10 MG tablet     Cholecalciferol (VITAMIN D-3) 125 MCG (5000 UT) TABS     diazepam (VALIUM) 5 MG tablet     docusate sodium (COLACE) 100 MG tablet     gabapentin (NEURONTIN) 300 MG capsule     multivitamin (ONE-DAILY) tablet     NONFORMULARY     Omega-3 Fish Oil 500 MG capsule     pseudoePHEDrine (SUDAFED) 30 MG tablet     sertraline (ZOLOFT) 50 MG tablet     No current facility-administered medications for this visit.     Allergies   Allergen Reactions     Cocoa Butter Other (See Comments)     Other reaction(s): Other (See Comments)  Skin irritation with older or , fresh is ok   Skin irritation       Corticosteroids Other (See Comments)     Other reaction(s): With long term prednisone usage    Steroids \"homicidal rage.\"  States 'rage'     Sulfa Antibiotics Diarrhea     Bloating, gas  Bloating, gas     Tilactase Diarrhea     Bloating, gas       Physical " Examination    General: Patient appears comfortable in no acute distress.   HEENT: NC/AT, no icterus, moist mucous membranes  Chest: non-labored on RA  Extremities: Warm, no edema  Skin: No rash or lesion   Psych: Affect appropriate for situation   Neuro:  Mental status: Awake, alert, attentive. Language is fluent with intact comprehension.   Cranial nerves: PERRL with no relative afferent pupillary defect, conjugate gaze, EOMI, visual fields intact, face symmetric, shoulder shrug strong, tongue protrusion/uvula midline, no dysarthria.   Motor: Normal muscle bulk and tone. No abnormal movements. 5/5 strength in 4/4 extremities.     R L  Deltoid  5 5  Biceps  5 5  Triceps  5 5  Wrist ext 5 5  Finger ext 5 5  Finger abd 5 5    Hip flexion 5 5  Knee flexion 5 5  Knee ext 5 5  Dorsiflexion 5 5    Reflexes: Brisk reflexes symmetric in biceps, brachioradialis, patellae, and achilles. No clonus, toes down-going.  Sensory: Intact to light touch, pin, vibration, and proprioception. Romberg is negative.   Coordination: FNF and HS without ataxia or dysmetria.    Gait: Wide base gait, stride length. Tandem walk impaired. Some difficulty balancing on right foot.     Laboratory:  2022  CD 19 <1% (absolute 3)  Vit D 10  Imagin2020  MRI brain  Single new inactive demyelinating lesion in the RIGHT frontal periventricular white matter. Otherwise stable demyelinating disease burden compared to the prior MRI of 2019. No actively demyelinating lesions on today's MRI.   MRI C & T spine  There are four inactive demyelinating plaques within the cervical cord and one inactive demyelinating plaque in the thoracic cord. No actively demyelinating lesions within the visualized cervical or thoracic spinal cord. No comparison exams available to evaluate for possible progression.       Assessment/Plan:  Guzman Davidson is a 27 year old male who presents for follow up for multiple sclerosis. He has been stable on Ocrevus  clinically although he has not had imaging recently. He is tolerating the infusions well. He reports he has mild balance difficulty and no falls. He also endorses sexual dysfunction. We discussed that this is likely related to MS and there are not great medication options. Vibratory stimulation may help. He is not taking Vitamin D for cost concerns for 'multi vitamins'. We discussed that we do not see indication for replacement of other vitamins at this time and he can take Vitamin D 5000 international unit(s) daily and the patient agreed. We discussed that he could potentially schedule his next MRI the same day of his Ocrevus infusion so he does not need to take another day off work.      - MRI Brain and C spine with and without contrast in 4 months  - Ocrevus infusion in 4 months  - Blood work on day of next Ocrevus infusion  - Vitamin D 5000 international unit(s) daily     Return to clinic in 6 months      Patient seen and discussed with Dr. Gross.     I have reviewed the plan with the patient, who is in agreement.    Chapito Bailey  Neurology Resident PGY-2    Attending physician: I saw and evaluated the patient with Dr. Bailey, and I agree with his findings and plan of care as documented above, with the following additions.    The patient is clinically stable with no evidence of active inflammatory demyelination on current disease modifying therapy with ocrelizumab. He will remain on this medication, with the next infusion due on or about 9-. He would like to have follow-up MRI imaging performed on the same date, which is fine, and will order MRI scans of the brain and cervical spine to be performed at that time.    Additionally, on the day of the infusion I will check routine blood tests for monitoring of this medication to include CBC with differential, hepatic panel, and total antibody (IgG) level. Patient relates vague history of having been told to take vitamin B12 previously, and will also  check a B12 level to make sure that there is no indication of deficiency of this vitamin.    I did advise him to take 5000 units of vitamin D daily, and will recheck this value at the time of the next infusion to follow up on known vitamin D deficiency.    Brandon Gross MD   of Neurology  [facility]    Diagnoses:  1) MS  2) Vitamin D deficiency    Cc:  Patient

## 2023-05-25 NOTE — NURSING NOTE
Chief Complaint   Patient presents with     MS     RECHECK     MS follow up      Vitals were taken and medications were reconciled.   Santos Tovar, EMT  8:37 AM

## 2023-05-25 NOTE — Clinical Note
5/25/2023       RE: Guzman Davidson  [address] [facility] [facility] [address]     Dear Colleague,    Thank you for referring your patient, Guzman Davidson, to the [facility]. Please see a copy of my visit note below.      Neurology Clinic Visit    Reason: Multiple Sclerosis ***      05/25/2023   Source of information: Patient and chart review    History of Present Symptom:  Guzman Davidson is a 27 year old male with a PMH significant for tobacco use and multiple sclerosis who presents today for follow up.      His last Ocrevus infusion was on 03/17/2023. He was restarted on Gabapentin for his spasticity.    States he is no longer homeless as of the past 4 months. He had a significant injury jumping off a wall doing parkour. States he did not see a similar wall a few feet in front of him which he collided with. He met with an orthopedist and was provided a brace for a fractured patella.      Has has pain, mainly back pain. Using marijuana for pain on his own.      Taking Zoloft 50 mg daily. Reports inability to climax. This has been off and on in the past few years, wonders if it is associated with the medication. Has tried viagra in the past. Not currently following with a psychiatrist but does have a mental health appointment coming up.     There was concern for new non-enhancing lesion in the right frontal lobe on MRI in 2020 but this was questioned by his previous neurologist. No comparison available for our review.     He is taking vitamin D dose unclear, level was 10 mcg/L in the past   Uses cannabis, helps with spasticity.       Disease onset: age 15 with double vision, gait abnormalities, severe vertigo and vomitting.   Most recent relapse: Reports a few minor relapses since initial symptoms. Mainly loss of function of the left leg.   Previous disease modifying therapy:   Glatiramer acetate (intermitent use  "due to insurance and moving) resulting in subsequent relapse.  Ocrelizumab started , last infusion 2/15/2022      Symptom management:  Fatigue:  Mood:  Bowel/bladder changes:  Gait/balance:     The patient's medical, surgical, social, and family history were personally reviewed with the patient.  No past medical history on file.   No past surgical history on file.  Social History     Tobacco Use     Smoking status: Every Day     Years: 12.00     Types: Cigarettes     Smokeless tobacco: Never   Substance Use Topics     Alcohol use: Yes     Comment: SOCIAL     Drug use: Yes     Frequency: 7.0 times per week     Types: Marijuana     No family history on file.  Current Outpatient Medications   Medication     acetaminophen (TYLENOL) 325 MG tablet     amphetamine-dextroamphetamine (ADDERALL) 10 MG tablet     Cholecalciferol (VITAMIN D-3) 125 MCG (5000 UT) TABS     diazepam (VALIUM) 5 MG tablet     docusate sodium (COLACE) 100 MG tablet     gabapentin (NEURONTIN) 300 MG capsule     multivitamin (ONE-DAILY) tablet     NONFORMULARY     Omega-3 Fish Oil 500 MG capsule     pseudoePHEDrine (SUDAFED) 30 MG tablet     sertraline (ZOLOFT) 50 MG tablet     No current facility-administered medications for this visit.     Allergies   Allergen Reactions     Cocoa Butter Other (See Comments)     Other reaction(s): Other (See Comments)  Skin irritation with older or , fresh is ok   Skin irritation       Corticosteroids Other (See Comments)     Other reaction(s): With long term prednisone usage    Steroids \"homicidal rage.\"  States 'rage'     Sulfa Antibiotics Diarrhea     Bloating, gas  Bloating, gas     Tilactase Diarrhea     Bloating, gas       Physical Examination   Vitals: There were no vitals taken for this visit.   General: Patient appears comfortable in no acute distress.   HEENT: NC/AT, no icterus, moist mucous membranes  Chest: non-labored on RA  Extremities: Warm, no edema  Skin: No rash or lesion   Psych: Affect " appropriate for situation   Neuro:  Mental status: Awake, alert, attentive. Language is fluent with intact comprehension.   Cranial nerves: PERRL with no relative afferent pupillary defect, conjugate gaze, EOMI, visual fields intact, face symmetric, shoulder shrug strong, tongue protrusion/uvula midline, no dysarthria.   Motor: Normal muscle bulk and tone. No abnormal movements. 5/5 strength in 4/4 extremities.     R L  Deltoid  5 5  Biceps  5 5  Triceps 5 5  Wrist ext 5 5  Finger ext 5 5  Finger abd 5 5    Hip flexion 5 5  Knee flexion 5 5  Knee ext 5 5  Dorsiflexion 5 5    Reflexes: Brisk reflexes symmetric in biceps, brachioradialis, patellae, and achilles. No clonus, toes down-going.  Sensory: Intact to light touch, pin, vibration, and proprioception. Romberg is negative.   Coordination: FNF and HS without ataxia or dysmetria.    Gait: Wide base gait, stride length. Tandem walk impaired. Some difficulty balancing on right foot.     Laboratory:  2022  CD 19 <1% (absolute 3)  Vit D 10      Imagin2020  MRI brain  Single new inactive demyelinating lesion in the RIGHT frontal periventricular white matter. Otherwise stable demyelinating disease burden compared to the prior MRI of 2019. No actively demyelinating lesions on today's MRI.   MRI C & T spine  There are four inactive demyelinating plaques within the cervical cord and one inactive demyelinating plaque in the thoracic cord. No actively demyelinating lesions within the visualized cervical or thoracic spinal cord. No comparison exams available to evaluate for possible progression.         Assessment/Plan:  Guzman Davidson is a 27 year old male who presents for follow up for multiple sclerosis. He has been stable on Ocrevus clinically although he has not had imaging recently. He is tolerating the infusions well. He reports he has mild balance difficulty and a few falls, particularly on stairs. He also endorses sexual dysfunction. We  discussed that his cervical cord lesions may be contributing to this.      We discussed that considering his complex mental health history including recent episodes of megha and mood swings it would be best if he continues his zoloft as prescribed for now and establish with a regular mental health provider who can best advise him on how to procceed. We discussed that something in another drug class like Wellbutrin may not have similar side effects but that decision and transition should be followed closely by a specialist.      Gabapentin was helpful in the past for spasticity but he has run out.      -we will re-order gabapentin 300 mg TID   -MRI   -continue ocrevus (scheduled for this month)   -blood tests on day of infusion   -we ordered vitamin D and other multivitamins for him      Return to clinic in 6 months      Patient seen and discussed with Dr. Kirk.   I have reviewed the plan with the patient, who is in agreement.      Chapito Bailey  Neurology Resident PGY-2          Again, thank you for allowing me to participate in the care of your patient.      Sincerely,    Brandon Gross MD

## 2023-09-18 ENCOUNTER — INFUSION THERAPY VISIT (OUTPATIENT)
Dept: INFUSION THERAPY | Facility: CLINIC | Age: 28
End: 2023-09-18
Attending: PSYCHIATRY & NEUROLOGY
Payer: MEDICARE

## 2023-09-18 VITALS
RESPIRATION RATE: 16 BRPM | TEMPERATURE: 98.5 F | OXYGEN SATURATION: 98 % | HEART RATE: 71 BPM | WEIGHT: 215.7 LBS | DIASTOLIC BLOOD PRESSURE: 78 MMHG | SYSTOLIC BLOOD PRESSURE: 123 MMHG

## 2023-09-18 DIAGNOSIS — G35 MS (MULTIPLE SCLEROSIS) (H): ICD-10-CM

## 2023-09-18 DIAGNOSIS — E55.9 VITAMIN D DEFICIENCY: ICD-10-CM

## 2023-09-18 DIAGNOSIS — G35 MULTIPLE SCLEROSIS (H): Primary | ICD-10-CM

## 2023-09-18 LAB
ALBUMIN SERPL BCG-MCNC: 4.5 G/DL (ref 3.5–5.2)
ALP SERPL-CCNC: 71 U/L (ref 40–129)
ALT SERPL W P-5'-P-CCNC: 13 U/L (ref 0–70)
AST SERPL W P-5'-P-CCNC: 22 U/L (ref 0–45)
BASOPHILS # BLD AUTO: 0 10E3/UL (ref 0–0.2)
BASOPHILS NFR BLD AUTO: 0 %
BILIRUB DIRECT SERPL-MCNC: <0.2 MG/DL (ref 0–0.3)
BILIRUB SERPL-MCNC: 0.2 MG/DL
EOSINOPHIL # BLD AUTO: 0.1 10E3/UL (ref 0–0.7)
EOSINOPHIL NFR BLD AUTO: 1 %
ERYTHROCYTE [DISTWIDTH] IN BLOOD BY AUTOMATED COUNT: 12.5 % (ref 10–15)
HCT VFR BLD AUTO: 42.7 % (ref 40–53)
HGB BLD-MCNC: 15 G/DL (ref 13.3–17.7)
IMM GRANULOCYTES # BLD: 0 10E3/UL
IMM GRANULOCYTES NFR BLD: 0 %
LYMPHOCYTES # BLD AUTO: 1.7 10E3/UL (ref 0.8–5.3)
LYMPHOCYTES NFR BLD AUTO: 16 %
MCH RBC QN AUTO: 29.8 PG (ref 26.5–33)
MCHC RBC AUTO-ENTMCNC: 35.1 G/DL (ref 31.5–36.5)
MCV RBC AUTO: 85 FL (ref 78–100)
MONOCYTES # BLD AUTO: 0.8 10E3/UL (ref 0–1.3)
MONOCYTES NFR BLD AUTO: 7 %
NEUTROPHILS # BLD AUTO: 8 10E3/UL (ref 1.6–8.3)
NEUTROPHILS NFR BLD AUTO: 76 %
NRBC # BLD AUTO: 0 10E3/UL
NRBC BLD AUTO-RTO: 0 /100
PLATELET # BLD AUTO: 205 10E3/UL (ref 150–450)
PROT SERPL-MCNC: 6.7 G/DL (ref 6.4–8.3)
RBC # BLD AUTO: 5.03 10E6/UL (ref 4.4–5.9)
VIT B12 SERPL-MCNC: 390 PG/ML (ref 232–1245)
WBC # BLD AUTO: 10.6 10E3/UL (ref 4–11)

## 2023-09-18 PROCEDURE — 82607 VITAMIN B-12: CPT

## 2023-09-18 PROCEDURE — 250N000013 HC RX MED GY IP 250 OP 250 PS 637: Performed by: PSYCHIATRY & NEUROLOGY

## 2023-09-18 PROCEDURE — 96366 THER/PROPH/DIAG IV INF ADDON: CPT

## 2023-09-18 PROCEDURE — 82040 ASSAY OF SERUM ALBUMIN: CPT

## 2023-09-18 PROCEDURE — 96365 THER/PROPH/DIAG IV INF INIT: CPT

## 2023-09-18 PROCEDURE — 250N000011 HC RX IP 250 OP 636: Mod: JZ | Performed by: PSYCHIATRY & NEUROLOGY

## 2023-09-18 PROCEDURE — 258N000003 HC RX IP 258 OP 636: Performed by: PSYCHIATRY & NEUROLOGY

## 2023-09-18 PROCEDURE — 36415 COLL VENOUS BLD VENIPUNCTURE: CPT

## 2023-09-18 PROCEDURE — 96375 TX/PRO/DX INJ NEW DRUG ADDON: CPT

## 2023-09-18 PROCEDURE — 82784 ASSAY IGA/IGD/IGG/IGM EACH: CPT

## 2023-09-18 PROCEDURE — 85025 COMPLETE CBC W/AUTO DIFF WBC: CPT

## 2023-09-18 PROCEDURE — 82306 VITAMIN D 25 HYDROXY: CPT

## 2023-09-18 RX ORDER — DIPHENHYDRAMINE HCL 25 MG
50 CAPSULE ORAL ONCE
Status: CANCELLED | OUTPATIENT
Start: 2024-03-11

## 2023-09-18 RX ORDER — METHYLPREDNISOLONE SODIUM SUCCINATE 125 MG/2ML
125 INJECTION, POWDER, LYOPHILIZED, FOR SOLUTION INTRAMUSCULAR; INTRAVENOUS
Status: CANCELLED
Start: 2024-03-11

## 2023-09-18 RX ORDER — ACETAMINOPHEN 325 MG/1
650 TABLET ORAL ONCE
Status: CANCELLED | OUTPATIENT
Start: 2024-03-11

## 2023-09-18 RX ORDER — ALBUTEROL SULFATE 90 UG/1
1-2 AEROSOL, METERED RESPIRATORY (INHALATION)
Status: CANCELLED
Start: 2024-03-11

## 2023-09-18 RX ORDER — HEPARIN SODIUM (PORCINE) LOCK FLUSH IV SOLN 100 UNIT/ML 100 UNIT/ML
5 SOLUTION INTRAVENOUS
Status: CANCELLED | OUTPATIENT
Start: 2024-03-11

## 2023-09-18 RX ORDER — HEPARIN SODIUM,PORCINE 10 UNIT/ML
5 VIAL (ML) INTRAVENOUS
Status: CANCELLED | OUTPATIENT
Start: 2024-03-11

## 2023-09-18 RX ORDER — METHYLPREDNISOLONE SODIUM SUCCINATE 125 MG/2ML
125 INJECTION, POWDER, LYOPHILIZED, FOR SOLUTION INTRAMUSCULAR; INTRAVENOUS ONCE
Status: COMPLETED | OUTPATIENT
Start: 2023-09-18 | End: 2023-09-18

## 2023-09-18 RX ORDER — EPINEPHRINE 1 MG/ML
0.3 INJECTION, SOLUTION INTRAMUSCULAR; SUBCUTANEOUS EVERY 5 MIN PRN
Status: CANCELLED | OUTPATIENT
Start: 2024-03-11

## 2023-09-18 RX ORDER — DIPHENHYDRAMINE HYDROCHLORIDE 50 MG/ML
50 INJECTION INTRAMUSCULAR; INTRAVENOUS
Status: CANCELLED
Start: 2024-03-11

## 2023-09-18 RX ORDER — DIPHENHYDRAMINE HCL 25 MG
50 CAPSULE ORAL ONCE
Status: COMPLETED | OUTPATIENT
Start: 2023-09-18 | End: 2023-09-18

## 2023-09-18 RX ORDER — METHYLPREDNISOLONE SODIUM SUCCINATE 125 MG/2ML
125 INJECTION, POWDER, LYOPHILIZED, FOR SOLUTION INTRAMUSCULAR; INTRAVENOUS ONCE
Status: CANCELLED | OUTPATIENT
Start: 2024-03-11

## 2023-09-18 RX ORDER — ALBUTEROL SULFATE 0.83 MG/ML
2.5 SOLUTION RESPIRATORY (INHALATION)
Status: CANCELLED | OUTPATIENT
Start: 2024-03-11

## 2023-09-18 RX ORDER — ACETAMINOPHEN 325 MG/1
650 TABLET ORAL ONCE
Status: COMPLETED | OUTPATIENT
Start: 2023-09-18 | End: 2023-09-18

## 2023-09-18 RX ADMIN — ACETAMINOPHEN 650 MG: 325 TABLET ORAL at 13:57

## 2023-09-18 RX ADMIN — METHYLPREDNISOLONE SODIUM SUCCINATE 125 MG: 125 INJECTION, POWDER, LYOPHILIZED, FOR SOLUTION INTRAMUSCULAR; INTRAVENOUS at 14:10

## 2023-09-18 RX ADMIN — OCRELIZUMAB 600 MG: 300 INJECTION INTRAVENOUS at 14:20

## 2023-09-18 RX ADMIN — DIPHENHYDRAMINE HYDROCHLORIDE 50 MG: 25 CAPSULE ORAL at 13:57

## 2023-09-18 NOTE — PROGRESS NOTES
Infusion Nursing Note:  Guzman Davidson presents today for Ocrevus.    Patient seen by provider today: No   present during visit today: Not Applicable.    Note: Ocrevus infused about 2 hrs at a starting rate of 100 ml/hr x 15 minutes, then increased to 200 ml/hr x 15 minutes, then increased to 250 ml/hr x 30 minutes, and to a final rate of 300 ml/hr for the remaining volume. Premeds given per orders. Refused to stay for 60 minutes observation. Sent an Xceive message to provider ,Brandon Gross MD about it. Patient signed an AMA form.    Administrations This Visit       acetaminophen (TYLENOL) tablet 650 mg       Admin Date  09/18/2023 Action  $Given Dose  650 mg Route  Oral Administered By  Marilyn Smith RN              diphenhydrAMINE (BENADRYL) capsule 50 mg       Admin Date  09/18/2023 Action  $Given Dose  50 mg Route  Oral Administered By  Marilyn Smith RN              methylPREDNISolone sodium succinate (solu-MEDROL) injection 125 mg       Admin Date  09/18/2023 Action  $Given Dose  125 mg Route  Intravenous Administered By  Marilyn Smith RN              ocrelizumab (OCREVUS) 600 mg in sodium chloride 0.9 % 500 mL infusion       Admin Date  09/18/2023 Action  $New Bag Dose  600 mg Route  Intravenous Administered By  Marilyn Smith RN                   Intravenous Access:  Labs drawn without difficulty.  Peripheral IV placed.    Treatment Conditions:  Biological Infusion Checklist:  ~~~ NOTE: If the patient answers yes to any of the questions below, hold the infusion and contact ordering provider or on-call provider.    Have you recently had an elevated temperature, fever, chills, productive cough, coughing for 3 weeks or longer or hemoptysis,  abnormal vital signs, night sweats,  chest pain or have you noticed a decrease in your appetite, unexplained weight loss or fatigue? No  Do you have any open wounds or new incisions? No  Do you have any upcoming hospitalizations or surgeries? Does not include  esophagogastroduodenoscopy, colonoscopy, endoscopic retrograde cholangiopancreatography (ERCP), endoscopic ultrasound (EUS), dental procedures or joint aspiration/steroid injections No  Do you currently have any signs of illness or infection or are you on any antibiotics? No  Have you had any new, sudden or worsening abdominal pain? No  Have you or anyone in your household received a live vaccination in the past 4 weeks? Please note: No live vaccines while on biologic/chemotherapy until 6 months after the last treatment. Patient can receive the flu vaccine (shot only), pneumovax and the Covid vaccine. It is optimal for the patient to get these vaccines mid cycle, but they can be given at any time as long as it is not on the day of the infusion. No  Have you recently been diagnosed with any new nervous system diseases (ie. Multiple sclerosis, Guillain [place], seizures, neurological changes) or cancer diagnosis? Are you on any form of radiation or chemotherapy? No  Are you pregnant or breast feeding or do you have plans of pregnancy in the future? No  Have you been having any signs of worsening depression or suicidal ideations?  (benlysta only) No  Have there been any other new onset medical symptoms? No  Have you had any new blood clots? (IVIG only) No      Post Infusion Assessment:  Patient tolerated infusion without incident.  Blood return noted pre and post infusion.  Site patent and intact, free from redness, edema or discomfort.  No evidence of extravasations.  Access discontinued per protocol.  Biologic Infusion Post Education: Call the triage nurse at your clinic or seek medical attention if you have chills and/or temperature greater than or equal to 100.5, uncontrolled nausea/vomiting, diarrhea, constipation, dizziness, shortness of breath, chest pain, heart palpitations, weakness or any other new or concerning symptoms, questions or concerns.  You cannot have any live virus vaccines prior to or during  treatment or up to 6 months post infusion.  If you have an upcoming surgery, medical procedure or dental procedure during treatment, this should be discussed with your ordering physician and your surgeon/dentist.  If you are having any concerning symptom, if you are unsure if you should get your next infusion or wish to speak to a provider before your next infusion, please call your care coordinator or triage nurse at your clinic to notify them so we can adequately serve you.       Discharge Plan:   Discharge instructions reviewed with: Patient.  Patient and/or family verbalized understanding of discharge instructions and all questions answered.  AVS to patient via [employer].  Patient will return to his provider as needed for next appointment.   Patient discharged in stable condition accompanied by: self.  Departure Mode: Ambulatory.    /78   Pulse 71   Temp 98.5  F (36.9  C) (Oral)   Resp 16   Wt 97.8 kg (215 lb 11.2 oz)   SpO2 98%      Marilyn Smith RN

## 2023-09-18 NOTE — PATIENT INSTRUCTIONS
Dear Guzman Davidson    Thank you for choosing [facility] ([facility]) for your infusion.  The following information is a summary of our appointment as well as important reminders.      EDUCATION POST BIOLOGICAL/CHEMOTHERAPY INFUSION  Call the triage nurse at your clinic or seek medical attention if you have chills and/or temperature greater than or equal to 100.5, uncontrolled nausea/vomiting, diarrhea, constipation, dizziness, shortness of breath, chest pain, heart palpitations, weakness or any other new or concerning symptoms, questions or concerns.  You can not have any live virus vaccines prior to or during treatment or up to 6 months post infusion.  If you have an upcoming surgery, medical procedure or dental procedure during treatment, this should be discussed with your ordering physician and your surgeon/dentist.  If you are having any concerning symptom, if you are unsure if you should get your next infusion or wish to speak to a provider before your next infusion, please call your care coordinator or triage nurse at your clinic to notify them so we can adequately serve you.     If you are a transplant patient and require transplant education, please click on this link: [email or web address].    We look forward in seeing you on your next appointment here at [facility] ([facility]).  Please don t hesitate to call us at [phone] to reschedule any of your appointments or to speak with one of the [facility] registered nurses.  It was a pleasure taking care of you today.    Sincerely,    [facility]  [address] [place] [address]  [address]  Phone:  [phone]

## 2023-09-19 LAB — IGG SERPL-MCNC: 733 MG/DL (ref 610–1616)

## 2023-09-20 LAB — DEPRECATED CALCIDIOL+CALCIFEROL SERPL-MC: 22 UG/L (ref 20–75)

## 2023-12-29 ENCOUNTER — TELEPHONE (OUTPATIENT)
Dept: NEUROLOGY | Facility: CLINIC | Age: 28
End: 2023-12-29
Payer: MEDICARE

## 2023-12-29 NOTE — TELEPHONE ENCOUNTER
"Spoke with Guzman. Discussed clinic policy that we are unable to communicate with patients via email.     Guzman informed me that he has been missing appointments because he is unable to miss days of work. In addition, unable to complete MRIs due to claustrophobia and is unable to work if he takes sedative for MRI. Would only be willing to do open sided MRI. Further states that he would like to get an order for neurofilament light chain lab instead of MRI.     Also discussed Guzman's request for medical cannabis certification. Informed Guzman that this would need to be discussed in clinic visit and that we would need agreement from a psychiatrist he is established with, which he states would not be a problem. However, he requests that we text him what is needed, does not want the information via [email or web address]. Informed Guzman that we are unable to text patients. Guzman then stated \"we are done\" and ended the phone call.    Angie Blunt RN    " No

## 2023-12-29 NOTE — TELEPHONE ENCOUNTER
"Brandon Gross MD  P Multiple Sclerosis Nursing Pool  Patient sent the below message to my [employer] email:    \"So the reason I have been skipping getting the m. R. I. It's because it literally never helps whatever it uncovers. You can't really do anything about. So I don't see the point.  However, what does consistently help with my pain management? My sleeping and my mobility is cannabis.  Now the prices have gone down quite a bit in the dispensary. If possible I would like my registration number as soon as I could get it so that I can go ahead. And register myself and if you want to give me a new registration number as a new patient you can but i'm already an existing patient so I think I do have an ID number in that system.\"    Any discussion of medical cannabis certification requires an appointment (he missed his last scheduled office visit), as well as compliance with overall plan of care, which typically includes MRI imaging.    Moreover, in light of his mental health diagnoses, I would not be certifying him for the medical cannabis program without agreement from a psychiatrist with whom he has established regular care.    Please contact the patient with the above information--further, as per clinic policy make him aware that we cannot respond to direct email communication.  "

## 2024-02-24 ENCOUNTER — ANCILLARY PROCEDURE (OUTPATIENT)
Dept: MRI IMAGING | Facility: CLINIC | Age: 29
End: 2024-02-24
Attending: PSYCHIATRY & NEUROLOGY
Payer: MEDICARE

## 2024-02-24 DIAGNOSIS — G35 MS (MULTIPLE SCLEROSIS) (H): ICD-10-CM

## 2024-02-24 PROCEDURE — A9585 GADOBUTROL INJECTION: HCPCS | Performed by: RADIOLOGY

## 2024-02-24 PROCEDURE — 70553 MRI BRAIN STEM W/O & W/DYE: CPT | Mod: MG | Performed by: RADIOLOGY

## 2024-02-24 PROCEDURE — G1010 CDSM STANSON: HCPCS | Mod: GC | Performed by: RADIOLOGY

## 2024-02-24 PROCEDURE — 72156 MRI NECK SPINE W/O & W/DYE: CPT | Mod: MG | Performed by: RADIOLOGY

## 2024-02-24 RX ORDER — GADOBUTROL 604.72 MG/ML
10 INJECTION INTRAVENOUS ONCE
Status: COMPLETED | OUTPATIENT
Start: 2024-02-24 | End: 2024-02-24

## 2024-02-24 RX ADMIN — GADOBUTROL 9 ML: 604.72 INJECTION INTRAVENOUS at 11:40

## 2024-02-24 NOTE — DISCHARGE INSTRUCTIONS
MRI Contrast Discharge Instructions    The IV contrast you received today will pass out of your body in your  urine. This will happen in the next 24 hours. You will not feel this process.  Your urine will not change color.    Drink at least 4 extra glasses of water or juice today (unless your doctor  has restricted your fluids). This reduces the stress on your kidneys.  You may take your regular medicines.    If you are on dialysis: It is best to have dialysis today.    If you have a reaction: Most reactions happen right away. If you have  any new symptoms after leaving the hospital (such as hives or swelling),  call your hospital at the correct number below. Or call your family doctor.  If you have breathing distress or wheezing, call 911.    Special instructions: ***    I have read and understand the above information.    Signature:______________________________________ Date:___________    Staff:__________________________________________ Date:___________     Time:__________    [facility] Departments:    ___[place]: [phone]  ___[facility]: [phone]  ___[place]: [phone] ___[facility]: [phone]  ___[facility]: [phone]  ___[facility]: [phone]  ___[phone]  ___[facility]: [phone]  ___[phone]

## 2024-03-09 ENCOUNTER — HEALTH MAINTENANCE LETTER (OUTPATIENT)
Age: 29
End: 2024-03-09

## 2024-06-13 ENCOUNTER — OFFICE VISIT (OUTPATIENT)
Dept: NEUROLOGY | Facility: CLINIC | Age: 29
End: 2024-06-13
Attending: PSYCHIATRY & NEUROLOGY
Payer: MEDICARE

## 2024-06-13 ENCOUNTER — TELEPHONE (OUTPATIENT)
Dept: NEUROLOGY | Facility: CLINIC | Age: 29
End: 2024-06-13

## 2024-06-13 VITALS
HEART RATE: 66 BPM | OXYGEN SATURATION: 96 % | SYSTOLIC BLOOD PRESSURE: 111 MMHG | DIASTOLIC BLOOD PRESSURE: 71 MMHG | WEIGHT: 228.1 LBS

## 2024-06-13 DIAGNOSIS — E55.9 VITAMIN D DEFICIENCY: ICD-10-CM

## 2024-06-13 DIAGNOSIS — G35 MULTIPLE SCLEROSIS (H): Primary | ICD-10-CM

## 2024-06-13 DIAGNOSIS — M62.838 MUSCLE SPASM: ICD-10-CM

## 2024-06-13 PROCEDURE — G2211 COMPLEX E/M VISIT ADD ON: HCPCS | Performed by: PSYCHIATRY & NEUROLOGY

## 2024-06-13 PROCEDURE — 99214 OFFICE O/P EST MOD 30 MIN: CPT | Performed by: PSYCHIATRY & NEUROLOGY

## 2024-06-13 PROCEDURE — G0463 HOSPITAL OUTPT CLINIC VISIT: HCPCS | Performed by: PSYCHIATRY & NEUROLOGY

## 2024-06-13 RX ORDER — EPINEPHRINE 1 MG/ML
0.3 INJECTION, SOLUTION, CONCENTRATE INTRAVENOUS EVERY 5 MIN PRN
OUTPATIENT
Start: 2024-06-13

## 2024-06-13 RX ORDER — DIPHENHYDRAMINE HYDROCHLORIDE 50 MG/ML
50 INJECTION INTRAMUSCULAR; INTRAVENOUS
Start: 2024-06-13

## 2024-06-13 RX ORDER — ALBUTEROL SULFATE 90 UG/1
1-2 AEROSOL, METERED RESPIRATORY (INHALATION)
Start: 2024-06-13

## 2024-06-13 RX ORDER — METHYLPREDNISOLONE SODIUM SUCCINATE 125 MG/2ML
125 INJECTION, POWDER, LYOPHILIZED, FOR SOLUTION INTRAMUSCULAR; INTRAVENOUS ONCE
Status: CANCELLED | OUTPATIENT
Start: 2024-06-13

## 2024-06-13 RX ORDER — ACETAMINOPHEN 325 MG/1
650 TABLET ORAL ONCE
Status: CANCELLED | OUTPATIENT
Start: 2024-06-13

## 2024-06-13 RX ORDER — HEPARIN SODIUM,PORCINE 10 UNIT/ML
5-20 VIAL (ML) INTRAVENOUS DAILY PRN
OUTPATIENT
Start: 2024-06-13

## 2024-06-13 RX ORDER — METHYLPREDNISOLONE SODIUM SUCCINATE 125 MG/2ML
125 INJECTION, POWDER, LYOPHILIZED, FOR SOLUTION INTRAMUSCULAR; INTRAVENOUS
Start: 2024-06-13

## 2024-06-13 RX ORDER — HEPARIN SODIUM (PORCINE) LOCK FLUSH IV SOLN 100 UNIT/ML 100 UNIT/ML
5 SOLUTION INTRAVENOUS
OUTPATIENT
Start: 2024-06-13

## 2024-06-13 RX ORDER — DIPHENHYDRAMINE HCL 25 MG
50 CAPSULE ORAL ONCE
Status: CANCELLED | OUTPATIENT
Start: 2024-06-13

## 2024-06-13 RX ORDER — ALBUTEROL SULFATE 0.83 MG/ML
2.5 SOLUTION RESPIRATORY (INHALATION)
OUTPATIENT
Start: 2024-06-13

## 2024-06-13 ASSESSMENT — PAIN SCALES - GENERAL: PAINLEVEL: MILD PAIN (2)

## 2024-06-13 NOTE — PATIENT INSTRUCTIONS
Proceed with next Ocrevus infusion at your earliest convenience: you can call [phone] to schedule    2.   If you would like to establish primary care at this location, you can contact [phone] to schedule    3.   Recommend that you take 5000 units of vitamin D daily    4.   We certified you for participation in the [place] Medical Cannabis program today    5.   Return to clinic in 6 months

## 2024-06-13 NOTE — Clinical Note
6/13/2024       RE: Guzman Davidson  [address] [address]     Dear Colleague,    Thank you for referring your patient, Guzman Davidson, to the [facility]. Please see a copy of my visit note below.    Referral source: Established patient    Chief complaint: Multiple sclerosis    History of the Present Illness: Mr. Guzman Davidson is a 29 year old right-handed man who presents to the Multiple Sclerosis Clinic today for a scheduled follow up visit regarding his diagnosis of multiple sclerosis.    The patient's history is as per my previous notes.  Initial onset of symptoms of demyelinating disease was at age 15 when he developed double vision, gait instability, and vertigo.  He was diagnosed with multiple sclerosis and initially placed on disease modifying therapy with glatiramer acetate.  Compliance with this treatment was variable with barriers including insurance difficulties as well as housing instability.  Per report, he had clinical relapses in the setting of noncompliance with glatiramer acetate, leading to switch to ocrelizumab in 2018.     He remains on ocrelizumab up until the present time, although of note he is overdue for treatment with the last infusion having been completed on 9/18/2023.    Today, the patient denies any new episodic changes in vision, balance, strength or sensation suggestive of new relapse of multiple sclerosis since he was last seen in this clinic.    He notes that back pain and tightness worsens with rainy weather.  He is requesting certification for the [place] medical cannabis program as he feels that medicinal cannabis helps with muscle spasms, sleep, and mood swings.    PHYSICAL EXAMINATION:  VITAL SIGNS: Blood pressure 111/71; pulse 66; oxygen saturation 96%; weight 103.5 kg.  GENERAL: Well-nourished man who presents to the examination alone, awake and alert  and in no acute distress.    NEUROLOGIC EXAMINATION:  CRANIAL NERVES: Visual fields are full to confrontation.  Extraocular movements are intact with no internuclear ophthalmoplegia.  Facial strength is normal.  Palate elevation and tongue protrusion are normal.  POWER: Strength is within normal limits in proximal and distal muscles in the upper and lower limbs throughout.  REFLEXES: Reflexes are symmetric and within normal limits in the arms.  Reflexes are brisk in the lower limbs bilaterally, greater on the right.  There were a few beats of clonus at the right ankle.  MOTOR/CEREBELLAR: There is no appendicular ataxia on finger-to-nose testing.  Rapid alternating movements are within normal limits in the hands and fingers.  There is no pronator drift in the arms.  GAIT: The patient is able to ambulate on a flat, level surface with no gross loss of postural stability and able to walk on heels and toes.  There is likely some mild gait spasticity.  Tandem gait is mildly to moderately impaired for age.    Investigations: I reviewed the results of laboratory studies performed on the date of the patient's most recent ocrelizumab infusion (9/18/2023).  Complete blood counts with differential, hepatic panel, and total antibody (IgG) level were normal.  Vitamin B12 level is normal.  Vitamin D was borderline low at 22 mcg/L.    Assessment/plan:    1. Multiple sclerosis  The patient is clinically stable as regards any evidence of active inflammatory demyelination on current disease modifying therapy with ocrelizumab.  As noted above, he is overdue for this treatment.  I provided him with the number to the infusion center and advised that he schedule the next infusion at his earliest convenience.    I will see him back in 6 months for a review.    2. Muscle spasms  I discussed with the patient that cannabis compounds have been shown to be beneficial for symptoms experienced by many people with MS, including pain and  spasticity.  Not surprisingly, these benefits may be associated with side effects including increased cognitive symptoms.      Cannabis does not have any disease modifying effect on MS and is not a substitute for disease modifying therapies.  I regard medical cannabis as similar to other symptomatic therapies, in which the benefit from the treatment needs to be balanced against side effects as well as cost.  I did certify him for participation in the [place] medical cannabis program today as per his request.    3. Vitamin D deficiency  He is not taking a vitamin D supplement currently, and I advised that he take 5000 international units of vitamin D daily to try to bring his level up to our goal range of 60 to 80 mcg/L.    The patient indicates that he does not have an active primary care provider.  I gave him the number to the primary care clinic here at the [facility] and [facility] if he would like to establish primary care at this facility.    The longitudinal plans of care for the diagnoses as documented were addressed during this visit. Due to the added complexity in care, I will continue to support the patient in subsequent management and with ongoing continuity of care.    ADDENDUM: Ocrelizumab infusion was completed on 6/27/2024.      Again, thank you for allowing me to participate in the care of your patient.      Sincerely,    Brandon Gross MD

## 2024-06-13 NOTE — TELEPHONE ENCOUNTER
Pt overdue for Ocrevus. Updated orders pended to Dr Gross. Once signed, pt will be contacted to schedule.    Angie Blunt RN

## 2024-06-13 NOTE — NURSING NOTE
Chief Complaint   Patient presents with    MS    RECHECK     Ms follow up      Vitals were taken and medications were reconciled.   Santos Tovar, EMT  9:57 AM

## 2024-06-14 NOTE — TELEPHONE ENCOUNTER
Message sent to [facility] scheduling to contact pt to schedule infusion.    Angie Blunt RN

## 2024-06-27 ENCOUNTER — INFUSION THERAPY VISIT (OUTPATIENT)
Dept: INFUSION THERAPY | Facility: CLINIC | Age: 29
End: 2024-06-27
Attending: PSYCHIATRY & NEUROLOGY
Payer: MEDICARE

## 2024-06-27 VITALS
OXYGEN SATURATION: 95 % | HEART RATE: 70 BPM | RESPIRATION RATE: 18 BRPM | WEIGHT: 232.6 LBS | SYSTOLIC BLOOD PRESSURE: 103 MMHG | DIASTOLIC BLOOD PRESSURE: 64 MMHG | TEMPERATURE: 98.1 F

## 2024-06-27 DIAGNOSIS — G35 MULTIPLE SCLEROSIS (H): Primary | ICD-10-CM

## 2024-06-27 PROCEDURE — 250N000011 HC RX IP 250 OP 636: Mod: JZ | Performed by: PSYCHIATRY & NEUROLOGY

## 2024-06-27 PROCEDURE — 96365 THER/PROPH/DIAG IV INF INIT: CPT

## 2024-06-27 PROCEDURE — 96375 TX/PRO/DX INJ NEW DRUG ADDON: CPT

## 2024-06-27 PROCEDURE — 258N000003 HC RX IP 258 OP 636: Performed by: PSYCHIATRY & NEUROLOGY

## 2024-06-27 PROCEDURE — 96366 THER/PROPH/DIAG IV INF ADDON: CPT

## 2024-06-27 PROCEDURE — 250N000013 HC RX MED GY IP 250 OP 250 PS 637: Performed by: PSYCHIATRY & NEUROLOGY

## 2024-06-27 RX ORDER — DIPHENHYDRAMINE HCL 25 MG
50 CAPSULE ORAL ONCE
OUTPATIENT
Start: 2024-12-24

## 2024-06-27 RX ORDER — ACETAMINOPHEN 325 MG/1
650 TABLET ORAL ONCE
OUTPATIENT
Start: 2024-12-24

## 2024-06-27 RX ORDER — DIPHENHYDRAMINE HYDROCHLORIDE 50 MG/ML
50 INJECTION INTRAMUSCULAR; INTRAVENOUS
Start: 2024-12-24

## 2024-06-27 RX ORDER — EPINEPHRINE 1 MG/ML
0.3 INJECTION, SOLUTION INTRAMUSCULAR; SUBCUTANEOUS EVERY 5 MIN PRN
OUTPATIENT
Start: 2024-12-24

## 2024-06-27 RX ORDER — METHYLPREDNISOLONE SODIUM SUCCINATE 125 MG/2ML
125 INJECTION, POWDER, LYOPHILIZED, FOR SOLUTION INTRAMUSCULAR; INTRAVENOUS ONCE
Status: COMPLETED | OUTPATIENT
Start: 2024-06-27 | End: 2024-06-27

## 2024-06-27 RX ORDER — DIPHENHYDRAMINE HCL 25 MG
50 CAPSULE ORAL ONCE
Status: COMPLETED | OUTPATIENT
Start: 2024-06-27 | End: 2024-06-27

## 2024-06-27 RX ORDER — METHYLPREDNISOLONE SODIUM SUCCINATE 125 MG/2ML
125 INJECTION, POWDER, LYOPHILIZED, FOR SOLUTION INTRAMUSCULAR; INTRAVENOUS
Start: 2024-12-24

## 2024-06-27 RX ORDER — ALBUTEROL SULFATE 0.83 MG/ML
2.5 SOLUTION RESPIRATORY (INHALATION)
OUTPATIENT
Start: 2024-12-24

## 2024-06-27 RX ORDER — METHYLPREDNISOLONE SODIUM SUCCINATE 125 MG/2ML
125 INJECTION, POWDER, LYOPHILIZED, FOR SOLUTION INTRAMUSCULAR; INTRAVENOUS ONCE
OUTPATIENT
Start: 2024-12-24

## 2024-06-27 RX ORDER — HEPARIN SODIUM (PORCINE) LOCK FLUSH IV SOLN 100 UNIT/ML 100 UNIT/ML
5 SOLUTION INTRAVENOUS
OUTPATIENT
Start: 2024-12-24

## 2024-06-27 RX ORDER — HEPARIN SODIUM,PORCINE 10 UNIT/ML
5-20 VIAL (ML) INTRAVENOUS DAILY PRN
OUTPATIENT
Start: 2024-12-24

## 2024-06-27 RX ORDER — ACETAMINOPHEN 325 MG/1
650 TABLET ORAL ONCE
Status: COMPLETED | OUTPATIENT
Start: 2024-06-27 | End: 2024-06-27

## 2024-06-27 RX ORDER — ALBUTEROL SULFATE 90 UG/1
1-2 AEROSOL, METERED RESPIRATORY (INHALATION)
Start: 2024-12-24

## 2024-06-27 RX ADMIN — OCRELIZUMAB 600 MG: 300 INJECTION INTRAVENOUS at 13:52

## 2024-06-27 RX ADMIN — DIPHENHYDRAMINE HYDROCHLORIDE 50 MG: 25 CAPSULE ORAL at 13:35

## 2024-06-27 RX ADMIN — METHYLPREDNISOLONE SODIUM SUCCINATE 125 MG: 125 INJECTION, POWDER, FOR SOLUTION INTRAMUSCULAR; INTRAVENOUS at 13:35

## 2024-06-27 RX ADMIN — ACETAMINOPHEN 650 MG: 325 TABLET ORAL at 13:35

## 2024-06-27 ASSESSMENT — PAIN SCALES - GENERAL: PAINLEVEL: NO PAIN (0)

## 2024-06-27 NOTE — PATIENT INSTRUCTIONS
Dear Guzman Davidson    Thank you for choosing [facility] ([facility]) for your infusion.  The following information is a summary of our appointment as well as important reminders.      Last dose of Tylenol was 1:30pm.    We look forward in seeing you on your next appointment here at [facility] ([facility]).  Please don t hesitate to call us at [phone] to reschedule any of your appointments or to speak with one of the [facility] registered nurses.  It was a pleasure taking care of you today.    Sincerely,    [facility]  [facility]fusion [facility]  [address] [place] [address]  [address]  Phone:  [phone]

## 2024-06-27 NOTE — PROGRESS NOTES
Infusion Nursing Note:  Guzman Davidson presents today for Ocrevus.    Patient seen by provider today: No   present during visit today: Not Applicable.    Note: Pre medications administered as ordered. Ocrevus started at 100 ml/hr x 15 minutes, increased to 200 ml/hr x 15 minutes, then increased to 250 ml/hr x 30 minutes, and to a final rate of 300 ml/hr for the remaining volume. Pt refused to wait for 1 hour obs. AMA form signed and sent for scanning. Renato notified.       Intravenous Access:  Peripheral IV placed.    Treatment Conditions:  Biological Infusion Checklist:  ~~~ NOTE: If the patient answers yes to any of the questions below, hold the infusion and contact ordering provider or on-call provider.    Have you recently had an elevated temperature, fever, chills, productive cough, coughing for 3 weeks or longer or hemoptysis,  abnormal vital signs, night sweats,  chest pain or have you noticed a decrease in your appetite, unexplained weight loss or fatigue? Yes, pt has been experiencing fatigue and decreased appetite for the past month. Dr Gross aware and Ok'd to proceed with todays infusion.  Do you have any open wounds or new incisions? No  Do you have any upcoming hospitalizations or surgeries? Does not include esophagogastroduodenoscopy, colonoscopy, endoscopic retrograde cholangiopancreatography (ERCP), endoscopic ultrasound (EUS), dental procedures or joint aspiration/steroid injections No  Do you currently have any signs of illness or infection or are you on any antibiotics? No  Have you had any new, sudden or worsening abdominal pain? No  Have you or anyone in your household received a live vaccination in the past 4 weeks? Please note: No live vaccines while on biologic/chemotherapy until 6 months after the last treatment. Patient can receive the flu vaccine (shot only), pneumovax and the Covid vaccine. It is optimal for the patient to get these vaccines mid cycle, but they can  be given at any time as long as it is not on the day of the infusion. No  Have you recently been diagnosed with any new nervous system diseases (ie. Multiple sclerosis, Guillain [place], seizures, neurological changes) or cancer diagnosis? Are you on any form of radiation or chemotherapy? No  Are you pregnant or breast feeding or do you have plans of pregnancy in the future? No  Have you been having any signs of worsening depression or suicidal ideations?  (benlysta only) No  Have there been any other new onset medical symptoms? Yes, Pt states he has been having outbursts in public and is unsure if due to MS, heat or mental health. Dr Gross aware.   Have you had any new blood clots? (IVIG only) No      Post Infusion Assessment:  Patient tolerated infusion without incident.  Patient refused to wait for 1 hour obs  Blood return noted pre and post infusion.  Site patent and intact, free from redness, edema or discomfort.  No evidence of extravasations.  Access discontinued per protocol.  Biologic Infusion Post Education: Call the triage nurse at your clinic or seek medical attention if you have chills and/or temperature greater than or equal to 100.5, uncontrolled nausea/vomiting, diarrhea, constipation, dizziness, shortness of breath, chest pain, heart palpitations, weakness or any other new or concerning symptoms, questions or concerns.  You cannot have any live virus vaccines prior to or during treatment or up to 6 months post infusion.  If you have an upcoming surgery, medical procedure or dental procedure during treatment, this should be discussed with your ordering physician and your surgeon/dentist.  If you are having any concerning symptom, if you are unsure if you should get your next infusion or wish to speak to a provider before your next infusion, please call your care coordinator or triage nurse at your clinic to notify them so we can adequately serve you.       Discharge Plan:   Discharge instructions  reviewed with: Patient.  Patient and/or family verbalized understanding of discharge instructions and all questions answered.  AVS to patient via [employer].  Patient will return in 6 months for next appointment.   Patient discharged in stable condition accompanied by: self.  Departure Mode: Ambulatory.    Administrations This Visit       acetaminophen (TYLENOL) tablet 650 mg       Admin Date  06/27/2024 Action  $Given Dose  650 mg Route  Oral Documented By  Caty Hannon RN              diphenhydrAMINE (BENADRYL) capsule 50 mg       Admin Date  06/27/2024 Action  $Given Dose  50 mg Route  Oral Documented By  aCty Hannon RN              methylPREDNISolone sodium succinate (solu-MEDROL) injection 125 mg       Admin Date  06/27/2024 Action  $Given Dose  125 mg Route  Intravenous Documented By  Caty Hannon RN              ocrelizumab (OCREVUS) 600 mg in sodium chloride 0.9 % 500 mL infusion       Admin Date  06/27/2024 Action  $New Bag Dose  600 mg Route  Intravenous Documented By  Caty Hannon RN                  /64 (BP Location: Left arm, Patient Position: Sitting, Cuff Size: Adult Large)   Pulse 70   Temp 98.1  F (36.7  C) (Oral)   Resp 18   Wt 105.5 kg (232 lb 9.6 oz)   SpO2 95%     Caty Hannon RN

## 2024-10-02 NOTE — PROGRESS NOTES
Referral source: Established patient    Chief complaint: Multiple sclerosis    History of the Present Illness: Mr. Guzman Davidson is a 29 year old right-handed man who presents to the [facility] today for a scheduled follow up visit regarding his diagnosis of multiple sclerosis.    The patient's history is as per my previous notes.  Initial onset of symptoms of demyelinating disease was at age 15 when he developed double vision, gait instability, and vertigo.  He was diagnosed with multiple sclerosis and initially placed on disease modifying therapy with glatiramer acetate.  Compliance with this treatment was variable with barriers including insurance difficulties as well as housing instability.  Per report, he had clinical relapses in the setting of noncompliance with glatiramer acetate, leading to switch to ocrelizumab in 2018.     He remains on ocrelizumab up until the present time, although of note he is overdue for treatment with the last infusion having been completed on 9/18/2023.    Today, the patient denies any new episodic changes in vision, balance, strength or sensation suggestive of new relapse of multiple sclerosis since he was last seen in this clinic.    He notes that back pain and tightness worsens with rainy weather.  He is requesting certification for the [place] medical cannabis program as he feels that medicinal cannabis helps with muscle spasms, sleep, and mood swings.    PHYSICAL EXAMINATION:  VITAL SIGNS: Blood pressure 111/71; pulse 66; oxygen saturation 96%; weight 103.5 kg.  GENERAL: Well-nourished man who presents to the examination alone, awake and alert and in no acute distress.    NEUROLOGIC EXAMINATION:  CRANIAL NERVES: Visual fields are full to confrontation.  Extraocular movements are intact with no internuclear ophthalmoplegia.  Facial strength is normal.  Palate elevation and tongue protrusion are normal.  POWER: Strength is within normal limits in proximal and  distal muscles in the upper and lower limbs throughout.  REFLEXES: Reflexes are symmetric and within normal limits in the arms.  Reflexes are brisk in the lower limbs bilaterally, greater on the right.  There were a few beats of clonus at the right ankle.  MOTOR/CEREBELLAR: There is no appendicular ataxia on finger-to-nose testing.  Rapid alternating movements are within normal limits in the hands and fingers.  There is no pronator drift in the arms.  GAIT: The patient is able to ambulate on a flat, level surface with no gross loss of postural stability and able to walk on heels and toes.  There is likely some mild gait spasticity.  Tandem gait is mildly to moderately impaired for age.    Investigations: I reviewed the results of laboratory studies performed on the date of the patient's most recent ocrelizumab infusion (9/18/2023).  Complete blood counts with differential, hepatic panel, and total antibody (IgG) level were normal.  Vitamin B12 level is normal.  Vitamin D was borderline low at 22 mcg/L.    Assessment/plan:    1. Multiple sclerosis  The patient is clinically stable as regards any evidence of active inflammatory demyelination on current disease modifying therapy with ocrelizumab.  As noted above, he is overdue for this treatment.  I provided him with the number to the infusion center and advised that he schedule the next infusion at his earliest convenience.    I will see him back in 6 months for a review.    2. Muscle spasms  I discussed with the patient that cannabis compounds have been shown to be beneficial for symptoms experienced by many people with MS, including pain and spasticity.  Not surprisingly, these benefits may be associated with side effects including increased cognitive symptoms.      Cannabis does not have any disease modifying effect on MS and is not a substitute for disease modifying therapies.  I regard medical cannabis as similar to other symptomatic therapies, in which the  benefit from the treatment needs to be balanced against side effects as well as cost.  I did certify him for participation in the [place] medical cannabis program today as per his request.    3. Vitamin D deficiency  He is not taking a vitamin D supplement currently, and I advised that he take 5000 international units of vitamin D daily to try to bring his level up to our goal range of 60 to 80 mcg/L.    The patient indicates that he does not have an active primary care provider.  I gave him the number to the primary care clinic here at the [facility] if he would like to establish primary care at this facility.    The longitudinal plans of care for the diagnoses as documented were addressed during this visit. Due to the added complexity in care, I will continue to support the patient in subsequent management and with ongoing continuity of care.    ADDENDUM: Ocrelizumab infusion was completed on 6/27/2024.

## 2024-12-12 ENCOUNTER — OFFICE VISIT (OUTPATIENT)
Dept: NEUROLOGY | Facility: CLINIC | Age: 29
End: 2024-12-12
Attending: PSYCHIATRY & NEUROLOGY
Payer: MEDICARE

## 2024-12-12 VITALS
DIASTOLIC BLOOD PRESSURE: 76 MMHG | SYSTOLIC BLOOD PRESSURE: 132 MMHG | OXYGEN SATURATION: 98 % | HEART RATE: 67 BPM | WEIGHT: 223 LBS | BODY MASS INDEX: 31.92 KG/M2 | HEIGHT: 70 IN

## 2024-12-12 DIAGNOSIS — R39.11 URINARY HESITANCY: ICD-10-CM

## 2024-12-12 DIAGNOSIS — F31.70 BIPOLAR AFFECTIVE DISORDER IN REMISSION (H): ICD-10-CM

## 2024-12-12 DIAGNOSIS — R37 SEXUAL DYSFUNCTION: ICD-10-CM

## 2024-12-12 DIAGNOSIS — F39 MOOD DISORDER (H): ICD-10-CM

## 2024-12-12 DIAGNOSIS — E55.9 VITAMIN D DEFICIENCY: ICD-10-CM

## 2024-12-12 DIAGNOSIS — G35 MS (MULTIPLE SCLEROSIS) (H): Primary | ICD-10-CM

## 2024-12-12 PROCEDURE — 99214 OFFICE O/P EST MOD 30 MIN: CPT | Performed by: PSYCHIATRY & NEUROLOGY

## 2024-12-12 PROCEDURE — G0463 HOSPITAL OUTPT CLINIC VISIT: HCPCS | Performed by: PSYCHIATRY & NEUROLOGY

## 2024-12-12 PROCEDURE — G2211 COMPLEX E/M VISIT ADD ON: HCPCS | Performed by: PSYCHIATRY & NEUROLOGY

## 2024-12-12 ASSESSMENT — PAIN SCALES - GENERAL: PAINLEVEL_OUTOF10: NO PAIN (0)

## 2024-12-12 NOTE — NURSING NOTE
Chief Complaint   Patient presents with    MS    RECHECK     6 month follow up      Vitals were taken and medications were reconciled.   Santos Tovar, EMT  10:07 AM

## 2024-12-12 NOTE — LETTER
"12/12/2024      RE: Guzman Davidson  [address] [address]     Referral source: Established patient    Chief complaint: Multiple sclerosis    History of the Present Illness: Mr. Guzman Davidson is a 29 year old left-handed man who presents to the [facility]clerosis Clinic today for a scheduled follow up visit regarding his diagnosis of multiple sclerosis.    The patient's history is as per my previous notes.  Initial onset of symptoms of demyelinating disease was at age 15 when he developed double vision, gait instability, and vertigo.  He was diagnosed with multiple sclerosis and initially placed on disease modifying therapy with glatiramer acetate.  Compliance with this treatment was variable with barriers including insurance difficulties as well as housing instability.  Per report, he had clinical relapses in the setting of noncompliance with glatiramer acetate, leading to switch to ocrelizumab in 2018. He remains on that medication up until the present time, with the most recent infusion administered on 6/27/2024.    Today, he denies any new episodic changes in vision, balance, strength, or sensation suggestive of new relapse of multiple sclerosis since he was last seen in this clinic. 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    We discussed mood symptoms. He was diagnosed with bipolar disorder in adolescence and treated with lithium, but is currently not on any psychotropic medications. He relates that he intermittently experiences unwanted, passive suicidal thoughts but denies any intent to act of such impulses. He is more concerned about \"randomly feeling psycho\", with outbursts of anger. He inquires about mood stabilization.    We discussed sexual dysfunction, specifically difficulties with orgasm. This is one of the reasons why he stopped SSRIs.    He also endorses some urinary hesitancy.    PHYSICAL EXAMINATION:  VITAL " SIGNS: Blood pressure 132/76; pulse 67; weight 101.2 kg; height 1.76 m; oxygen saturation 98%.  GENERAL: Well-nourished man who presents to the examination alone, awake and alert and in no acute distress.      NEUROLOGIC EXAMINATION:  MENTAL STATUS: He is mildly hyperkinetic. Thought processes are however logical and goal directed. There are no overt indications of delusions, psychosis, or response to internal stimuli.  CRANIAL NERVES: Visual fields are full to confrontation.  Extraocular movements are intact with no internuclear ophthalmoplegia.  Facial strength is normal.  Palate elevation and tongue protrusion are normal.  POWER: Strength is within normal limits in proximal and distal muscles in the upper and lower limbs throughout.  REFLEXES: Reflexes are symmetric and within normal limits in the arms and legs.  MOTOR/CEREBELLAR: There is no appendicular ataxia on finger-to-nose testing.  Rapid alternating movements are within normal limits in the hands and fingers.  There is no pronator drift in the arms.  GAIT: The patient is able to ambulate on a flat, level surface with no gross loss of postural stability, and able to walk on heels and toes. Tandem gait is mildly to moderately impaired for age.    Assessment/plan:    1. Multiple sclerosis  The patient is clinically stable as regards any evidence of active inflammatory demyelination on current disease modifying therapy with ocrelizumab, which he will continue, with the next infusion to be performed as scheduled on 12/23/2024.    Prior to the infusion, I will obtain routine laboratory studies for monitoring of this medication to include complete blood counts with differential, hepatic panel, and total antibody (IgG) level.    I will see him back in 6 months for a review.    2. Mood disorder, NOS  He is meeting with a therapist regularly.  I discussed with him that there are other antidepressant options, particularly bupropion, that are less likely to  "contribute to issues with sexual dysfunction.  He does recall being treated with bupropion in the past and does not recall any particular effect, for good or ill.  Of note, he does for a history of one seizure in the remote past, which occurred in the context of substance use.    He is not wanting to start another antidepressant at this time, but is interested in a mood stabilizer.  I told him that I would recommend that he be evaluated by one of our colleagues in psychiatry as treatment of bipolar disorder is outside the scope of my practice.  We will go ahead and make that referral.    3. Sexual dysfunction  Spinal cord demyelination is likely contributing to his symptoms in this regard.  He does use Hims \"chews\", but erectile dysfunction is less of an issue than problems with climax, at present. He is not anorgasmic but has increasing difficulties reaching orgasm without exhaustion or mechanical irritation of the penis. Unfortunately, there are no medications that are specifically approved for this problem. We discussed that mechanical vibratory stimulation and sexual activities in addition to intercourse can be helpful in managing this issue.     4. Urinary hesitancy  Similarly, this is likely related to spinal cord demyelination. At present, this is not bothersome enough that he would want to be on medication. We can re-address at any point in the future, as needed.    5. Vitamin D deficiency  I will check a vitamin D level at his next laboratory draw and advise him on supplementation with vitamin D as needed to maintain his level within our goal range of 50 to 80 mcg/L.  Currently, he is taking 4000 international units of vitamin D daily.    The longitudinal plans of care for the diagnoses as documented were addressed during this visit. Due to the added complexity in care, I will continue to support the patient in subsequent management and with ongoing continuity of care.    Brandon Gross, " MD   of Neurology  [facility]    Cc:  Patient

## 2024-12-12 NOTE — Clinical Note
"12/12/2024       RE: Guzman Davidson  [address] [address]     Dear Colleague,    Thank you for referring your patient, Guzman Davidson, to the [facility] [place] at [facility]. Please see a copy of my visit note below.    Referral source: Established patient    Chief complaint: Multiple sclerosis    History of the Present Illness: Mr. Guzman Davidson is a 29 year old left-handed man who presents to the Multiple Sclerosis Clinic today for a scheduled follow up visit regarding his diagnosis of multiple sclerosis.    The patient's history is as per my previous notes.  Initial onset of symptoms of demyelinating disease was at age 15 when he developed double vision, gait instability, and vertigo.  He was diagnosed with multiple sclerosis and initially placed on disease modifying therapy with glatiramer acetate.  Compliance with this treatment was variable with barriers including insurance difficulties as well as housing instability.  Per report, he had clinical relapses in the setting of noncompliance with glatiramer acetate, leading to switch to ocrelizumab in 2018. He remains on that medication up until the present time, with the most recent infusion administered on 6/27/2024.    Today, he denies any new episodic changes in vision, balance, strength, or sensation suggestive of new relapse of multiple sclerosis since he was last seen in this clinic. 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    We discussed mood symptoms. He was diagnosed with bipolar disorder in adolescence and treated with lithium, but is currently not on any psychotropic medications. He relates that he intermittently experiences unwanted, passive suicidal thoughts but denies any intent to act of such impulses. He is more concerned about \"randomly feeling psycho\", with outbursts of " anger. He inquires about mood stabilization.    We discussed sexual dysfunction, specifically difficulties with orgasm. This is one of the reasons why he stopped SSRIs.    He also endorses some urinary hesitancy.    PHYSICAL EXAMINATION:  VITAL SIGNS: Blood pressure 132/76; pulse 67; weight 101.2 kg; height 1.76 m; oxygen saturation 98%.  GENERAL: Well-nourished man who presents to the examination alone, awake and alert and in no acute distress.    NEUROLOGIC EXAMINATION:  MENTAL STATUS: He is mildly hyperkinetic. Thought processes are however logical and goal directed. There are no overt indications of delusions, psychosis, or response to internal stimuli.  CRANIAL NERVES: Visual fields are full to confrontation.  Extraocular movements are intact with no internuclear ophthalmoplegia.  Facial strength is normal.  Palate elevation and tongue protrusion are normal.  POWER: Strength is within normal limits in proximal and distal muscles in the upper and lower limbs throughout.  REFLEXES: Reflexes are symmetric and within normal limits in the arms and legs.  MOTOR/CEREBELLAR: There is no appendicular ataxia on finger-to-nose testing.  Rapid alternating movements are within normal limits in the hands and fingers.  There is no pronator drift in the arms.  GAIT: The patient is able to ambulate on a flat, level surface with no gross loss of postural stability, and able to walk on heels and toes. Tandem gait is mildly to moderately impaired for age.    Assessment/plan:    1. Multiple sclerosis  The patient is clinically stable as regards any evidence of active inflammatory demyelination on current disease modifying therapy with ocrelizumab, which he will continue, with the next infusion to be performed as scheduled on 12/23/2024.    Prior to the infusion, I will obtain routine laboratory studies for monitoring of this medication to include complete blood counts with differential, hepatic panel, and total antibody (IgG)  "level.    I will see him back in 6 months for a review.    2. Mood disorder, NOS  He is meeting with a therapist regularly.  I discussed with him that there are other antidepressant options, particularly bupropion, that are less likely to contribute to issues with sexual dysfunction.  He does recall being treated with bupropion in the past and does not recall any particular effect, for good or ill.  Of note, he does for a history of one seizure in the remote past, which occurred in the context of substance use.    He is not wanting to start another antidepressant at this time, but is interested in a mood stabilizer.  I told him that I would recommend that he be evaluated by one of our colleagues in psychiatry as treatment of bipolar disorder is outside the scope of my practice.  We will go ahead and make that referral.    3. Sexual dysfunction  Spinal cord demyelination is likely contributing to his symptoms in this regard.  He does use Hims \"chews\", but erectile dysfunction is less of an issue than problems with climax, at present. He is not anorgasmic but has increasing difficulties reaching orgasm without exhaustion or mechanical irritation of the penis. Unfortunately, there are no medications that are specifically approved for this problem. We discussed that mechanical vibratory stimulation and sexual activities in addition to intercourse can be helpful in managing this issue.     4. Urinary hesitancy  Similarly, this is likely related to spinal cord demyelination. At present, this is not bothersome enough that he would want to be on medication. We can re-address at any point in the future, as needed.    5. Vitamin D deficiency  I will check a vitamin D level at his next laboratory draw and advise him on supplementation with vitamin D as needed to maintain his level within our goal range of 50 to 80 mcg/L.  Currently, he is taking 4000 international units of vitamin D daily.    The longitudinal plans of care for " the diagnoses as documented were addressed during this visit. Due to the added complexity in care, I will continue to support the patient in subsequent management and with ongoing continuity of care.        Again, thank you for allowing me to participate in the care of your patient.      Sincerely,    Brandon Gross MD

## 2024-12-12 NOTE — PATIENT INSTRUCTIONS
Proceed with next Ocrevus infusion on or about December 23, 2024    2.   Blood tests on the day of infusion    3.   If you would like to establish primary care at this location, you can contact [phone] to schedule     4.   We placed a referral to psychiatry to discuss possibility of medications for mood/mood stabilization    5.   Return to clinic in 6 months

## 2024-12-16 ENCOUNTER — VIRTUAL VISIT (OUTPATIENT)
Dept: PHARMACY | Facility: CLINIC | Age: 29
End: 2024-12-16
Payer: COMMERCIAL

## 2024-12-16 DIAGNOSIS — Z78.9 TAKES DIETARY SUPPLEMENTS: ICD-10-CM

## 2024-12-16 DIAGNOSIS — G35 MULTIPLE SCLEROSIS (H): ICD-10-CM

## 2024-12-16 DIAGNOSIS — R45.1 AGITATION: ICD-10-CM

## 2024-12-16 DIAGNOSIS — F48.9 MENTAL HEALTH PROBLEM: Primary | ICD-10-CM

## 2024-12-16 PROCEDURE — 99207 PR NO CHARGE LOS: CPT | Mod: 93 | Performed by: PHARMACIST

## 2024-12-16 NOTE — Clinical Note
Hello, you meet with this patient on 1/7. He is going to attempt to get records from previous clinic in [place], where he had neuropsych eval and he thought that more comprehensive past med list might be in the those notes. Please see my note for details and let me know what questions you have. I'm happy to schedule follow up with him after your appointment to check in after med changes. Lizbeth

## 2024-12-16 NOTE — PROGRESS NOTES
Medication Therapy Management (MTM) Encounter    ASSESSMENT:                            Medication Adherence/Access: No issues identified.    Mental Health:   Has upcoming psychiatry intake in a couple weeks for assessment. He will attempt to get neuropsychological testing records in the meantime.     Supplements:   Stable. Continue current medication.    MS    Stable. Continue current medication.    PLAN:                            No changes today     Follow-up: psychiatry intake with Dr. Ochoa on 1/7  MTM as needed after that to follow up on med changes    SUBJECTIVE/OBJECTIVE:                          Guzman Davidson is a 29 year old male seen for an initial visit. He was referred to me from psychiatry intake.      Reason for visit: med review.    Allergies/ADRs: Reviewed in chart  Past Medical History: Reviewed in chart  Tobacco: He reports that he has quit smoking. His smoking use included cigarettes. He has never used smokeless tobacco.  Vapes or uses Zin if working overnight.  Alcohol: rarely  THC/CBD: medical cannabis for MS related muscle spasms    Medication Adherence/Access: no issues reported.    Mental Health:   -no current medications    Patient stated that he has a lot of mood lability that is affecting all aspects of his life. There have been periods of euthymia, then episodes of feeling more depressed or agitated that seem to occur with various triggers. Felt that mood had been stable for quite while,  he had an outburst at work at a new job he really liked, which was unexpected.   Sees therapist once per week.   Falls asleep ok, but may toss in the night due to uncomfortable mattress. Doesn't often feel rested upon waking.   Endorsed past suicidal thoughts, though not recently. Stated that he's more worried about possibly hurting someone else when he gets highly agitated.    Took Adderall in childhood, which was quite helpful for focus and attention. Tried in adulthood when he was homeless  "in 2022, but was much too stimulating, \"more like meth,\" so stopped taking .      Past Medication Trials  States that he has taken many more medications, but does not recall the details.     Medications Max dose Dates/Duration Discontinuation Reason Other Notes   sertraline 50mg 8/2022 x 6 mo Thinks it didn't work, \"killed libido\" which caused a lot of agitation           gabapentin 300mg TID? 9/2022 x 1 year? Built tolerance Helpful for spasms   diazepam   Helped for anxiety, \"felt useless\" with it though    lorazepam       Adderall   Very stimulating    atomoxetine  [id number] Doesn't remember    lithium 300mg TID? [id number] \"Got rebellious in high school and stopped taking it\" Helpful for mood swings    risperidone  2003/2004? X 6mo Gained a lot of weight                Supplements:   -fish oil 1000mg daily  -Vitamin D 4000 units daily  No concerns.    MS    -medical cannabis  -Ocrevus every 6 months    Had used gabapentin for awhile, but needed continued increasing doses. Current medical cannabis has been helpful for reducing spasms and somewhat helpful for pain.       ----------------      I spent 30 minutes with this patient today.     A summary of these recommendations was sent via clinic portal.    Lizbeth Spaulding PharmD  Medication Therapy Management Pharmacist  [facility]    Telemedicine Visit Details  The patient's medications can be safely assessed via a telemedicine encounter.  Type of service:  Telephone visit  Originating Location (pt. Location): Other clinic    Distant Location (provider location):  Off-site  Start Time:  10:00am  End Time:  10:30am     Medication Therapy Recommendations  No medication therapy recommendations to display   "

## 2024-12-16 NOTE — PATIENT INSTRUCTIONS
"Recommendations from today's MTM visit:                                                    MTM (medication therapy management) is a service provided by a clinical pharmacist designed to help you get the most of out of your medicines.   Today we reviewed what your medicines are for, how to know if they are working, that your medicines are safe and how to make your medicine regimen as easy as possible.      No changes today     Follow-up: psychiatry intake with Dr. Ochoa on 1/7  MTM as needed after that to follow up on med changes    It was great speaking with you today.  I value your experience and would be very thankful for your time in providing feedback in our clinic survey. In the next few days, you may receive an email or text message from [employer] with a link to a survey related to your  clinical pharmacist.\"     To schedule another MTM appointment, please call the clinic directly or you may call the MTM scheduling line at [phone] or toll-free at [phone].     My Clinical Pharmacist's contact information:                                                      Please feel free to contact me with any questions or concerns you have.      Lizbeth Spaulding, PharmD  Medication Therapy Management Pharmacist  [facility]    "

## 2024-12-17 NOTE — PROGRESS NOTES
"Referral source: Established patient    Chief complaint: Multiple sclerosis    History of the Present Illness: Mr. Guzman Davidson is a 29 year old left-handed man who presents [facility] today for a scheduled follow up visit regarding his diagnosis of multiple sclerosis.    The patient's history is as per my previous notes.  Initial onset of symptoms of demyelinating disease was at age 15 when he developed double vision, gait instability, and vertigo.  He was diagnosed with multiple sclerosis and initially placed on disease modifying therapy with glatiramer acetate.  Compliance with this treatment was variable with barriers including insurance difficulties as well as housing instability.  Per report, he had clinical relapses in the setting of noncompliance with glatiramer acetate, leading to switch to ocrelizumab in 2018. He remains on that medication up until the present time, with the most recent infusion administered on 6/27/2024.    Today, he denies any new episodic changes in vision, balance, strength, or sensation suggestive of new relapse of multiple sclerosis since he was last seen in this clinic. 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    We discussed mood symptoms. He was diagnosed with bipolar disorder in adolescence and treated with lithium, but is currently not on any psychotropic medications. He relates that he intermittently experiences unwanted, passive suicidal thoughts but denies any intent to act of such impulses. He is more concerned about \"randomly feeling psycho\", with outbursts of anger. He inquires about mood stabilization.    We discussed sexual dysfunction, specifically difficulties with orgasm. This is one of the reasons why he stopped SSRIs.    He also endorses some urinary hesitancy.    PHYSICAL EXAMINATION:  VITAL SIGNS: Blood pressure 132/76; pulse 67; weight 101.2 kg; height 1.76 m; oxygen " saturation 98%.  GENERAL: Well-nourished man who presents to the examination alone, awake and alert and in no acute distress.    NEUROLOGIC EXAMINATION:  MENTAL STATUS: He is mildly hyperkinetic. Thought processes are however logical and goal directed. There are no overt indications of delusions, psychosis, or response to internal stimuli.  CRANIAL NERVES: Visual fields are full to confrontation.  Extraocular movements are intact with no internuclear ophthalmoplegia.  Facial strength is normal.  Palate elevation and tongue protrusion are normal.  POWER: Strength is within normal limits in proximal and distal muscles in the upper and lower limbs throughout.  REFLEXES: Reflexes are symmetric and within normal limits in the arms and legs.  MOTOR/CEREBELLAR: There is no appendicular ataxia on finger-to-nose testing.  Rapid alternating movements are within normal limits in the hands and fingers.  There is no pronator drift in the arms.  GAIT: The patient is able to ambulate on a flat, level surface with no gross loss of postural stability, and able to walk on heels and toes. Tandem gait is mildly to moderately impaired for age.    Assessment/plan:    1. Multiple sclerosis  The patient is clinically stable as regards any evidence of active inflammatory demyelination on current disease modifying therapy with ocrelizumab, which he will continue, with the next infusion to be performed as scheduled on 12/23/2024.    Prior to the infusion, I will obtain routine laboratory studies for monitoring of this medication to include complete blood counts with differential, hepatic panel, and total antibody (IgG) level.    I will see him back in 6 months for a review.    2. Mood disorder, NOS  He is meeting with a therapist regularly.  I discussed with him that there are other antidepressant options, particularly bupropion, that are less likely to contribute to issues with sexual dysfunction.  He does recall being treated with bupropion  "in the past and does not recall any particular effect, for good or ill.  Of note, he does for a history of one seizure in the remote past, which occurred in the context of substance use.    He is not wanting to start another antidepressant at this time, but is interested in a mood stabilizer.  I told him that I would recommend that he be evaluated by one of our colleagues in psychiatry as treatment of bipolar disorder is outside the scope of my practice.  We will go ahead and make that referral.    3. Sexual dysfunction  Spinal cord demyelination is likely contributing to his symptoms in this regard.  He does use Hims \"chews\", but erectile dysfunction is less of an issue than problems with climax, at present. He is not anorgasmic but has increasing difficulties reaching orgasm without exhaustion or mechanical irritation of the penis. Unfortunately, there are no medications that are specifically approved for this problem. We discussed that mechanical vibratory stimulation and sexual activities in addition to intercourse can be helpful in managing this issue.     4. Urinary hesitancy  Similarly, this is likely related to spinal cord demyelination. At present, this is not bothersome enough that he would want to be on medication. We can re-address at any point in the future, as needed.    5. Vitamin D deficiency  I will check a vitamin D level at his next laboratory draw and advise him on supplementation with vitamin D as needed to maintain his level within our goal range of 50 to 80 mcg/L.  Currently, he is taking 4000 international units of vitamin D daily.    The longitudinal plans of care for the diagnoses as documented were addressed during this visit. Due to the added complexity in care, I will continue to support the patient in subsequent management and with ongoing continuity of care.    "

## 2024-12-23 ENCOUNTER — INFUSION THERAPY VISIT (OUTPATIENT)
Dept: INFUSION THERAPY | Facility: CLINIC | Age: 29
End: 2024-12-23
Attending: PSYCHIATRY & NEUROLOGY
Payer: MEDICARE

## 2024-12-23 VITALS
SYSTOLIC BLOOD PRESSURE: 122 MMHG | WEIGHT: 238 LBS | TEMPERATURE: 98.3 F | BODY MASS INDEX: 34.64 KG/M2 | HEART RATE: 81 BPM | OXYGEN SATURATION: 97 % | DIASTOLIC BLOOD PRESSURE: 75 MMHG | RESPIRATION RATE: 16 BRPM

## 2024-12-23 DIAGNOSIS — G35 MULTIPLE SCLEROSIS (H): Primary | ICD-10-CM

## 2024-12-23 DIAGNOSIS — E55.9 VITAMIN D DEFICIENCY: ICD-10-CM

## 2024-12-23 DIAGNOSIS — G35 MS (MULTIPLE SCLEROSIS) (H): ICD-10-CM

## 2024-12-23 LAB
ALBUMIN SERPL BCG-MCNC: 4.4 G/DL (ref 3.5–5.2)
ALP SERPL-CCNC: 72 U/L (ref 40–150)
ALT SERPL W P-5'-P-CCNC: 20 U/L (ref 0–70)
AST SERPL W P-5'-P-CCNC: 24 U/L (ref 0–45)
BASOPHILS # BLD AUTO: 0 10E3/UL (ref 0–0.2)
BASOPHILS NFR BLD AUTO: 1 %
BILIRUB DIRECT SERPL-MCNC: <0.2 MG/DL (ref 0–0.3)
BILIRUB SERPL-MCNC: 0.3 MG/DL
EOSINOPHIL # BLD AUTO: 0.2 10E3/UL (ref 0–0.7)
EOSINOPHIL NFR BLD AUTO: 2 %
ERYTHROCYTE [DISTWIDTH] IN BLOOD BY AUTOMATED COUNT: 12.1 % (ref 10–15)
HCT VFR BLD AUTO: 43.1 % (ref 40–53)
HGB BLD-MCNC: 15.4 G/DL (ref 13.3–17.7)
IMM GRANULOCYTES # BLD: 0 10E3/UL
IMM GRANULOCYTES NFR BLD: 0 %
LYMPHOCYTES # BLD AUTO: 2.4 10E3/UL (ref 0.8–5.3)
LYMPHOCYTES NFR BLD AUTO: 31 %
MCH RBC QN AUTO: 29.8 PG (ref 26.5–33)
MCHC RBC AUTO-ENTMCNC: 35.7 G/DL (ref 31.5–36.5)
MCV RBC AUTO: 83 FL (ref 78–100)
MONOCYTES # BLD AUTO: 0.7 10E3/UL (ref 0–1.3)
MONOCYTES NFR BLD AUTO: 9 %
NEUTROPHILS # BLD AUTO: 4.4 10E3/UL (ref 1.6–8.3)
NEUTROPHILS NFR BLD AUTO: 57 %
NRBC # BLD AUTO: 0 10E3/UL
NRBC BLD AUTO-RTO: 0 /100
PLATELET # BLD AUTO: 247 10E3/UL (ref 150–450)
PROT SERPL-MCNC: 6.9 G/DL (ref 6.4–8.3)
RBC # BLD AUTO: 5.17 10E6/UL (ref 4.4–5.9)
VIT D+METAB SERPL-MCNC: 42 NG/ML (ref 20–50)
WBC # BLD AUTO: 7.8 10E3/UL (ref 4–11)

## 2024-12-23 PROCEDURE — 84450 TRANSFERASE (AST) (SGOT): CPT

## 2024-12-23 PROCEDURE — 258N000003 HC RX IP 258 OP 636: Performed by: PSYCHIATRY & NEUROLOGY

## 2024-12-23 PROCEDURE — 96366 THER/PROPH/DIAG IV INF ADDON: CPT

## 2024-12-23 PROCEDURE — 85004 AUTOMATED DIFF WBC COUNT: CPT

## 2024-12-23 PROCEDURE — 96375 TX/PRO/DX INJ NEW DRUG ADDON: CPT

## 2024-12-23 PROCEDURE — 96365 THER/PROPH/DIAG IV INF INIT: CPT

## 2024-12-23 PROCEDURE — 82248 BILIRUBIN DIRECT: CPT

## 2024-12-23 PROCEDURE — 82306 VITAMIN D 25 HYDROXY: CPT

## 2024-12-23 PROCEDURE — 36415 COLL VENOUS BLD VENIPUNCTURE: CPT

## 2024-12-23 PROCEDURE — 82784 ASSAY IGA/IGD/IGG/IGM EACH: CPT

## 2024-12-23 PROCEDURE — 250N000011 HC RX IP 250 OP 636: Performed by: PSYCHIATRY & NEUROLOGY

## 2024-12-23 PROCEDURE — 250N000013 HC RX MED GY IP 250 OP 250 PS 637: Performed by: PSYCHIATRY & NEUROLOGY

## 2024-12-23 PROCEDURE — 85049 AUTOMATED PLATELET COUNT: CPT

## 2024-12-23 RX ORDER — DIPHENHYDRAMINE HYDROCHLORIDE 50 MG/ML
50 INJECTION INTRAMUSCULAR; INTRAVENOUS
Start: 2024-12-24

## 2024-12-23 RX ORDER — ALBUTEROL SULFATE 0.83 MG/ML
2.5 SOLUTION RESPIRATORY (INHALATION)
OUTPATIENT
Start: 2024-12-24

## 2024-12-23 RX ORDER — METHYLPREDNISOLONE SODIUM SUCCINATE 125 MG/2ML
125 INJECTION INTRAMUSCULAR; INTRAVENOUS ONCE
Status: COMPLETED | OUTPATIENT
Start: 2024-12-23 | End: 2024-12-23

## 2024-12-23 RX ORDER — METHYLPREDNISOLONE SODIUM SUCCINATE 125 MG/2ML
125 INJECTION INTRAMUSCULAR; INTRAVENOUS
Start: 2024-12-24

## 2024-12-23 RX ORDER — ALBUTEROL SULFATE 90 UG/1
1-2 INHALANT RESPIRATORY (INHALATION)
Start: 2024-12-24

## 2024-12-23 RX ORDER — DIPHENHYDRAMINE HCL 25 MG
50 CAPSULE ORAL ONCE
OUTPATIENT
Start: 2024-12-24

## 2024-12-23 RX ORDER — ACETAMINOPHEN 325 MG/1
650 TABLET ORAL ONCE
OUTPATIENT
Start: 2024-12-24

## 2024-12-23 RX ORDER — HEPARIN SODIUM,PORCINE 10 UNIT/ML
5-20 VIAL (ML) INTRAVENOUS DAILY PRN
OUTPATIENT
Start: 2024-12-24

## 2024-12-23 RX ORDER — HEPARIN SODIUM (PORCINE) LOCK FLUSH IV SOLN 100 UNIT/ML 100 UNIT/ML
5 SOLUTION INTRAVENOUS
OUTPATIENT
Start: 2024-12-24

## 2024-12-23 RX ORDER — ACETAMINOPHEN 325 MG/1
650 TABLET ORAL ONCE
Status: COMPLETED | OUTPATIENT
Start: 2024-12-23 | End: 2024-12-23

## 2024-12-23 RX ORDER — EPINEPHRINE 1 MG/ML
0.3 INJECTION, SOLUTION INTRAMUSCULAR; SUBCUTANEOUS EVERY 5 MIN PRN
OUTPATIENT
Start: 2024-12-24

## 2024-12-23 RX ORDER — METHYLPREDNISOLONE SODIUM SUCCINATE 125 MG/2ML
125 INJECTION INTRAMUSCULAR; INTRAVENOUS ONCE
OUTPATIENT
Start: 2024-12-24

## 2024-12-23 RX ORDER — DIPHENHYDRAMINE HCL 25 MG
50 CAPSULE ORAL ONCE
Status: COMPLETED | OUTPATIENT
Start: 2024-12-23 | End: 2024-12-23

## 2024-12-23 RX ADMIN — DIPHENHYDRAMINE HYDROCHLORIDE 50 MG: 25 CAPSULE ORAL at 14:25

## 2024-12-23 RX ADMIN — ACETAMINOPHEN 650 MG: 325 TABLET ORAL at 14:25

## 2024-12-23 RX ADMIN — METHYLPREDNISOLONE SODIUM SUCCINATE 125 MG: 125 INJECTION, POWDER, FOR SOLUTION INTRAMUSCULAR; INTRAVENOUS at 14:26

## 2024-12-23 RX ADMIN — OCRELIZUMAB 600 MG: 300 INJECTION INTRAVENOUS at 14:45

## 2024-12-23 NOTE — PROGRESS NOTES
Infusion Nursing Note:  Guzman Davidson presents today for IV infusion; Ocrevus.    Patient seen by provider today: No   present during visit today: Not Applicable.    Note:   Administrations This Visit       acetaminophen (TYLENOL) tablet 650 mg       Admin Date  12/23/2024 Action  $Given Dose  650 mg Route  Oral Documented By  Amari Silva, RN              diphenhydrAMINE (BENADRYL) capsule 50 mg       Admin Date  12/23/2024 Action  $Given Dose  50 mg Route  Oral Documented By  Amari Silva RN              methylPREDNISolone Na Suc (solu-MEDROL) injection 125 mg       Admin Date  12/23/2024 Action  $Given Dose  125 mg Route  Intravenous Documented By  Amari Silva RN              ocrelizumab (OCREVUS) 600 mg in sodium chloride 0.9 % 500 mL infusion       Admin Date  12/23/2024 Action  $New Bag Dose  600 mg Route  Intravenous Documented By  Amari Silva, VALENTÍN                  Temp: 98.3  F (36.8  C) Temp src: Oral BP: 117/71 Pulse: 56   Resp: 18 SpO2: 96 % O2 Device: None (Room air)      238 lbs 0 oz    Intravenous Access:  Labs drawn without difficulty.  Peripheral IV placed by the vascular access team.    Treatment Conditions:  Biological Infusion Checklist:  ~~~ NOTE: If the patient answers yes to any of the questions below, hold the infusion and contact ordering provider or on-call provider.    Have you recently had an elevated temperature, fever, chills, productive cough, coughing for 3 weeks or longer or hemoptysis, abnormal vital signs, night sweats,  chest pain or have you noticed a decrease in your appetite, unexplained weight loss or fatigue? No  Do you have any open wounds or new incisions? No  Do you have any upcoming hospitalizations or surgeries? Does not include esophagogastroduodenoscopy, colonoscopy, endoscopic retrograde cholangiopancreatography (ERCP), endoscopic ultrasound (EUS), dental procedures or joint aspiration/steroid injections No  Do you currently have any signs of  illness or infection or are you on any antibiotics? No  Have you had any new, sudden or worsening abdominal pain? No  Have you or anyone in your household received a live vaccination in the past 4 weeks? Please note: No live vaccines while on biologic/chemotherapy until 6 months after the last treatment. Patient can receive the flu vaccine (shot only), pneumovax and the Covid vaccine. It is optimal for the patient to get these vaccines mid cycle, but they can be given at any time as long as it is not on the day of the infusion. No  Have you recently been diagnosed with any new nervous system diseases (ie. Multiple sclerosis, Guillain [place], seizures, neurological changes) or cancer diagnosis? Are you on any form of radiation or chemotherapy? No  Are you pregnant or breast feeding or do you have plans of pregnancy in the future? No  Have you been having any signs of worsening depression or suicidal ideations?  (benlysta only) N/A  Have there been any other new onset medical symptoms? No  Have you had any new blood clots? (IVIG only) N/A    Report given to late shift RN to finish patient's appointment.    Amari Silva RN

## 2024-12-23 NOTE — PATIENT INSTRUCTIONS
Dear Guzman Davidson    Thank you for choosing [facility] ([facility] for your infusion.  The following information is a summary of our appointment as well as important reminders.    EDUCATION POST BIOLOGICAL/CHEMOTHERAPY INFUSION  Call the triage nurse at your clinic or seek medical attention if you have chills and/or temperature greater than or equal to 100.5, uncontrolled nausea/vomiting, diarrhea, constipation, dizziness, shortness of breath, chest pain, heart palpitations, weakness or any other new or concerning symptoms, questions or concerns.  You can not have any live virus vaccines prior to or during treatment or up to 6 months post infusion.  If you have an upcoming surgery, medical procedure or dental procedure during treatment, this should be discussed with your ordering physician and your surgeon/dentist.  If you are having any concerning symptom, if you are unsure if you should get your next infusion or wish to speak to a provider before your next infusion, please call your care coordinator or triage nurse at your clinic to notify them so we can adequately serve you.     If you have any questions on your upcoming Specialty Infusion appointments, please call scheduling at [phone].  It was a pleasure taking care of you today.    Sincerely,    [facility]  [address] [place]  [address]  Phone:  [phone]

## 2024-12-23 NOTE — PROGRESS NOTES
Nursing Note  Guzman Davidson [facility] for:   Chief Complaint   Patient presents with    Infusion     IV infusion; Ocrevus     Report received from previous nurse. Patient declined one hour post infusion observation. AMA form signed and sent for scanning and in-basket message sent to provider. Patient discharged home in stable condition.     Post Infusion Assessment:  Patient tolerated infusion without incident.  Blood return noted post infusion.  Site patent and intact, free from redness, edema or discomfort.  No evidence of extravasations.  Access discontinued per protocol.     Discharge Plan:   Follow up plan of care with: ongoing infusions at [facility].  Discharge instructions were reviewed with patient.  Patient/representative verbalized understanding of discharge instructions and all questions answered.  Patient discharged from [facility] in stable condition.    Dominic Dumont RN    Administrations This Visit       acetaminophen (TYLENOL) tablet 650 mg       Admin Date  12/23/2024 Action  $Given Dose  650 mg Route  Oral Documented By  Amari Silva RN              diphenhydrAMINE (BENADRYL) capsule 50 mg       Admin Date  12/23/2024 Action  $Given Dose  50 mg Route  Oral Documented By  Amari Silva RN              methylPREDNISolone Na Suc (solu-MEDROL) injection 125 mg       Admin Date  12/23/2024 Action  $Given Dose  125 mg Route  Intravenous Documented By  Amari Silva RN              ocrelizumab (OCREVUS) 600 mg in sodium chloride 0.9 % 500 mL infusion       Admin Date  12/23/2024 Action  $New Bag Dose  600 mg Route  Intravenous Documented By  Amari Silva RN                    /75 (BP Location: Left arm, Patient Position: Sitting, Cuff Size: Adult Regular)   Pulse 81   Temp 98.3  F (36.8  C) (Oral)   Resp 16   Wt 108 kg (238 lb)   SpO2 97%   BMI 34.64 kg/m

## 2024-12-24 LAB — IGG SERPL-MCNC: 778 MG/DL (ref 610–1616)

## 2025-01-07 ENCOUNTER — OFFICE VISIT (OUTPATIENT)
Dept: PSYCHIATRY | Facility: CLINIC | Age: 30
End: 2025-01-07
Attending: PSYCHIATRY & NEUROLOGY
Payer: MEDICARE

## 2025-01-07 ENCOUNTER — TELEPHONE (OUTPATIENT)
Dept: PSYCHIATRY | Facility: CLINIC | Age: 30
End: 2025-01-07
Payer: MEDICARE

## 2025-01-07 VITALS
BODY MASS INDEX: 35.56 KG/M2 | SYSTOLIC BLOOD PRESSURE: 131 MMHG | WEIGHT: 244.3 LBS | DIASTOLIC BLOOD PRESSURE: 68 MMHG | HEART RATE: 59 BPM

## 2025-01-07 DIAGNOSIS — F31.70 BIPOLAR AFFECTIVE DISORDER IN REMISSION: ICD-10-CM

## 2025-01-07 DIAGNOSIS — F31.9 BIPOLAR 1 DISORDER (H): ICD-10-CM

## 2025-01-07 DIAGNOSIS — Z75.8 DOES NOT HAVE PRIMARY CARE PROVIDER: Primary | ICD-10-CM

## 2025-01-07 DIAGNOSIS — Z79.899 ENCOUNTER FOR LONG-TERM (CURRENT) USE OF MEDICATIONS: ICD-10-CM

## 2025-01-07 PROBLEM — S82.002D CLOSED NONDISPLACED FRACTURE OF LEFT PATELLA WITH ROUTINE HEALING: Status: RESOLVED | Noted: 2022-08-08 | Resolved: 2025-01-07

## 2025-01-07 PROBLEM — F90.9 ADHD: Status: ACTIVE | Noted: 2022-07-20

## 2025-01-07 PROBLEM — F43.10 POSTTRAUMATIC STRESS DISORDER: Status: ACTIVE | Noted: 2022-07-20

## 2025-01-07 PROBLEM — F17.200 TOBACCO USE DISORDER: Status: ACTIVE | Noted: 2022-08-08

## 2025-01-07 RX ORDER — DIVALPROEX SODIUM 500 MG/1
500 TABLET, FILM COATED, EXTENDED RELEASE ORAL AT BEDTIME
Qty: 30 TABLET | Refills: 1 | Status: SHIPPED | OUTPATIENT
Start: 2025-01-07 | End: 2025-01-07

## 2025-01-07 RX ORDER — DIVALPROEX SODIUM 500 MG/1
500 TABLET, FILM COATED, EXTENDED RELEASE ORAL AT BEDTIME
Qty: 30 TABLET | Refills: 1 | Status: SHIPPED | OUTPATIENT
Start: 2025-01-07

## 2025-01-07 ASSESSMENT — PATIENT HEALTH QUESTIONNAIRE - PHQ9
SUM OF ALL RESPONSES TO PHQ QUESTIONS 1-9: 16
SUM OF ALL RESPONSES TO PHQ QUESTIONS 1-9: 16
10. IF YOU CHECKED OFF ANY PROBLEMS, HOW DIFFICULT HAVE THESE PROBLEMS MADE IT FOR YOU TO DO YOUR WORK, TAKE CARE OF THINGS AT HOME, OR GET ALONG WITH OTHER PEOPLE: VERY DIFFICULT

## 2025-01-07 ASSESSMENT — PAIN SCALES - GENERAL: PAINLEVEL_OUTOF10: MILD PAIN (2)
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
## 2025-01-07 NOTE — PATIENT INSTRUCTIONS
Thank you for your visit today.      Treatment Plan Today:      1) Medications:   - Start Depakote 500mg nightly. Please do blood draw for Depakote level, platelets, and liver enzymes a week after starting Depakote. Please have your blood draw around 12 hours after your last dose.     2) Follow-up appointment with Dr Ochoa in about 4 weeks     3) In the case of an emergency, crisis numbers are below and clinic after hours number is [phone].    4) Placed referral for PCP.     **For crisis resources, please see the information at the end of this document**   Patient Education    Thank you for coming to the [facility] [place] [facility].     Lab Testing:  If you had lab testing today and your results are reassuring or normal they will be mailed to you or sent through [employer] within 7 days. If the lab tests need quick action we will call you with the results. The phone number we will call with results is # [phone]. If this is not the best number please call our clinic and change the number.     Medication Refills:  If you need any refills please call your pharmacy and they will contact us. Our fax number for refills is [phone].   Three business days of notice are needed for general medication refill requests.   Five business days of notice are needed for controlled substance refill requests.   If you need to change to a different pharmacy, please contact the new pharmacy directly. The new pharmacy will help you get your medications transferred.     Contact Us:  Please call [phone] during business hours (8-5:00 M-F).   If you have medication related questions after clinic hours, or on the weekend, please call [phone].     Financial Assistance [phone]   Medical Records [phone]       MENTAL HEALTH CRISIS RESOURCES:  For a emergency help, please call 911 or go to the nearest Emergency Department.     Emergency Walk-In Options:   [facility] @ [place]  [facility] ([place]): [phone] - Specialized mental health emergency area designed to be calming  [facility] ([place]): [phone]  [facility] ([place]): [phone]  [facility] ([place]): [phone]    [facility] Crisis Information:   [place]: [phone]  [place]: [phone]  [place] ([place]) - Adult: [phone]     Child: [phone]  [place] - Adult: [phone]     Child: [phone]  [place]: [phone]  List of all [facility] resources:   https://[place].gov/dhs/people-we-serve/adults/health-care/mental-health/resources/crisis-contacts.jsp    National Crisis Information:   Crisis Text Line: Text  [place]  to 460789  Suicide & Crisis Lifeline: 988  National Suicide Prevention Lifeline: 4-598-977-TALK (1-845.978.9944)       For online chat options, visit https://suicidepreventionlifeline.org/chat/  Poison Control Center: 1-984.704.7466  Trans Lifeline: 1-574.988.9302 - Hotline for transgender people of all ages  The [employer] Project: 0-029-639-9796 - Hotline for LGBT youth     For Non-Emergency Support:   Fast Tracker: Mental Health & Substance Use Disorder Resources -   https://www.Momo NetworksckTriboldn.org/

## 2025-01-07 NOTE — TELEPHONE ENCOUNTER
Writer called patient to advise of refill at [facility] and he confirms he would like this resent to [facility]. Writer resent Depakote prescription.

## 2025-01-07 NOTE — TELEPHONE ENCOUNTER
[facility] Call Center    Phone Message    May a detailed message be left on voicemail: yes     Reason for Call: Medication Refill Request    Has the patient contacted the pharmacy for the refill? Yes   Name of medication being requested: Depakote 500mg, new medication as of today  Provider who prescribed the medication: Dr. Ochoa  Pharmacy:    [facility] - [place], [place] - [address] [place] [address] [phone]  Date medication is needed: Caller stated they saw provider this morning and they were going to be put on this new medication. Caller stated they contacted the pharmacy who does not have this medication yet, patient stated they were hoping to start this as soon as possible. Caller stated they would like this medication sent to the [facility]. Caller asked for a confirmation call from clinic that this medication was sent to pharmacy.    Action Taken: Message routed to:  Other: [facility] Nurse pool    Travel Screening: Not Applicable     Date of Service:

## 2025-01-07 NOTE — NURSING NOTE
Chief Complaint   Patient presents with    Eval/Assessment     Bipolar affective disorder in remission     - Edison Smimons, Visit Facilitator

## 2025-01-21 ENCOUNTER — VIRTUAL VISIT (OUTPATIENT)
Dept: FAMILY MEDICINE | Facility: CLINIC | Age: 30
End: 2025-01-21
Payer: MEDICARE

## 2025-01-21 DIAGNOSIS — Z11.3 ROUTINE SCREENING FOR STI (SEXUALLY TRANSMITTED INFECTION): Primary | ICD-10-CM

## 2025-01-21 DIAGNOSIS — Z75.8 DOES NOT HAVE PRIMARY CARE PROVIDER: ICD-10-CM

## 2025-01-21 DIAGNOSIS — Z11.4 ENCOUNTER FOR SCREENING FOR HUMAN IMMUNODEFICIENCY VIRUS (HIV): ICD-10-CM

## 2025-01-21 DIAGNOSIS — K92.1 BLOOD IN STOOL: ICD-10-CM

## 2025-01-21 PROCEDURE — 98002 SYNCH AUDIO-VIDEO NEW MOD 45: CPT | Performed by: FAMILY MEDICINE

## 2025-01-21 RX ORDER — PANTOPRAZOLE SODIUM 40 MG/1
40 TABLET, DELAYED RELEASE ORAL
Qty: 28 TABLET | Refills: 0 | Status: SHIPPED | OUTPATIENT
Start: 2025-01-21 | End: 2025-02-04

## 2025-01-21 NOTE — PROGRESS NOTES
"Guzman Davidson is a 29 year old who is being evaluated via a billable video visit.          Assessment & Plan     Does not have primary care provider  Establish with me   Needs to schedule in person   - Primary Care Referral    Routine screening for STI (sexually transmitted infection)  - Treponema Abs w Reflex to RPR and Titer; Future  - HIV Antigen Antibody Combo Cascade; Future  - Chlamydia trachomatis/Neisseria gonorrhoeae by PCR; Future    Blood in stool  Will get labs   With history of MS, higher risk for autoimmune issues   Will test for some that can cause the bleeding   Get cbc   Pantoprazole in case it is an ulcer   - Adult GI  Referral - Procedure Only; Future  - Tissue transglutaminase frandy IgA and IgG; Future  - Comprehensive metabolic panel (BMP + Alb, Alk Phos, ALT, AST, Total. Bili, TP); Future  - CBC with platelets and differential; Future  - Elastase Fecal; Future  - Calprotectin Feces; Future  - pantoprazole (PROTONIX) 40 MG EC tablet; Take 1 tablet (40 mg) by mouth 2 times daily (before meals) for 14 days.    Encounter for screening for human immunodeficiency virus (HIV)  - HIV Antigen Antibody Combo Cascade; Future          BMI  Estimated body mass index is 35.56 kg/m  as calculated from the following:    Height as of 12/12/24: 1.765 m (5' 9.5\").    Weight as of 1/7/25: 110.8 kg (244 lb 4.8 oz).       Subjective   Guzman Davidson is a 29 year old, presenting for the following health issues:  bloody stool      Video Start Time:  1112    HPI     Blood stool   Over a year on and off   When eating spicy foods it is worse  Sometimes an ache in stomach and feels like a burp he cannot get out    Sometimes red and sometimes dark         Objective           Vitals:  No vitals were obtained today due to virtual visit.    Physical Exam   GENERAL: alert and no distress  EYES: Eyes grossly normal to inspection.  No discharge or erythema, or obvious scleral/conjunctival abnormalities.  RESP: No audible " wheeze, cough, or visible cyanosis.    SKIN: Visible skin clear. No significant rash, abnormal pigmentation or lesions.  NEURO: Cranial nerves grossly intact.  Mentation and speech appropriate for age.  PSYCH: Appropriate affect, tone, and pace of words        Video-Visit Details    Type of service:  Video Visit   Video End Time:11:24 AM  Originating Location (pt. Location): Home    Distant Location (provider location):  On-site  Platform used for Video Visit: Maurice  Signed Electronically by: Zoran Steinberg DO

## 2025-01-27 ENCOUNTER — MYC MEDICAL ADVICE (OUTPATIENT)
Dept: PSYCHIATRY | Facility: CLINIC | Age: 30
End: 2025-01-27
Payer: MEDICARE

## 2025-01-27 ENCOUNTER — TELEPHONE (OUTPATIENT)
Dept: PSYCHIATRY | Facility: CLINIC | Age: 30
End: 2025-01-27
Payer: MEDICARE

## 2025-01-27 NOTE — TELEPHONE ENCOUNTER
Annita Ochoa, DO  YouJust now (1:21 PM)     DJ  Thanks for the update Sridevi! Please let him we're still in the stage of optimizing his medications and we can talk more about how to help with anxiety at our next visit. But mainly please remind him to do the blood draw for Depakote so we are able to make the necessary adjustments at our next visit.    Annita Rust     Follow Up:  Writer attempted to call patient to advise of the above. LVM requesting a call back at the main clinic number and also advised that writer would send a more detailed [place] message.

## 2025-01-27 NOTE — TELEPHONE ENCOUNTER
[facility] Call Center    Phone Message    May a detailed message be left on voicemail: yes     Reason for Call: Other: Medication update     Patient wanted to pass along an update to their provider, Dr. Ochoa, regarding their current side effects / experience with their prescription of Depakote. The patient noted that it has been helping with their mental calmness, and in preventing outbursts or them running away--however they are still experiencing anxiety. The patient is finding this prescription to be helpful, but they were wanting to know if it's expected for them to still have some anxiety while on it, or if that is atypical. The patient also passed along that they were hoping to discuss potential alternative medications, or adjustments in their current dose--as they don't wish to remain on Depakote for too long a time.    Action Taken: Message routed to:  Other: [facility] psychiatry nurse pool    Travel Screening: Not Applicable     Date of Service: 1/27/2025

## 2025-01-28 ENCOUNTER — TELEPHONE (OUTPATIENT)
Dept: GASTROENTEROLOGY | Facility: CLINIC | Age: 30
End: 2025-01-28
Payer: MEDICARE

## 2025-01-28 NOTE — TELEPHONE ENCOUNTER
"Endoscopy Scheduling Screen    Have you had any respiratory illness or flu-like symptoms in the last 10 days?  No    What is your communication preference for Instructions and/or Bowel Prep?   MyChart/MAIL    What insurance is in the chart?  Other:  MEDICARE/[employer]    Ordering/Referring Provider: ADEBAYO NAJERA   (If ordering provider performs procedure, schedule with ordering provider unless otherwise instructed. )    BMI: Estimated body mass index is 35.56 kg/m  as calculated from the following:    Height as of 12/12/24: 1.765 m (5' 9.5\").    Weight as of 1/7/25: 110.8 kg (244 lb 4.8 oz).     Sedation Ordered  MAC/deep sedation.   BMI<= 45 45 < BMI <= 48 48 < BMI < = 50  BMI > 50   No Restrictions No [facility]  No [facility]  [facility] with exceptions Hospital Only OR Only       Do you have a history of malignant hyperthermia?  No    (Females) Are you currently pregnant?   No     Have you been diagnosed or told you have pulmonary hypertension?   No    Do you have an LVAD?  No    Have you been told you have moderate to severe sleep apnea?  No.    Have you been told you have COPD, asthma, or any other lung disease?  No    Do you have any heart conditions?  No     Have you ever had or are you waiting for an organ transplant?  No. Continue scheduling, no site restrictions.    Have you had a stroke or transient ischemic attack (TIA aka \"mini stroke\" in the last 6 months?   No    Have you been diagnosed with or been told you have cirrhosis of the liver?   No.    Are you currently on dialysis?   No    Do you need assistance transferring?   No    BMI: Estimated body mass index is 35.56 kg/m  as calculated from the following:    Height as of 12/12/24: 1.765 m (5' 9.5\").    Weight as of 1/7/25: 110.8 kg (244 lb 4.8 oz).     Is patients BMI > 40 and scheduling location [facility]?  No    Do you take an injectable or oral medication for weight loss or diabetes (excluding insulin)?  No    Do you take the medication " Naltrexone?  No    Do you take blood thinners?  No       Prep   Are you currently on dialysis or do you have chronic kidney disease?  No    Do you have a diagnosis of diabetes?  No    Do you have a diagnosis of cystic fibrosis (CF)?  No    On a regular basis do you go 3 -5 days between bowel movements?  No    BMI > 40?  No    Preferred Pharmacy:    [place], [place] - [address]  [address]  [facility] [address]  Phone: [phone] Fax: [phone] Alternate Fax: [phone], [phone]        Final Scheduling Details     Procedure scheduled  Colonoscopy / Upper endoscopy (EGD)    Surgeon:  ANABELA     Date of procedure:  2/18/25     Pre-OP / PAC:   Yes - Patient informed of pre-op requirement.    Location  [facility] - Patient preference.    Sedation   MAC/Deep Sedation - Per RN assessment.      Patient Reminders:   You will receive a call from a Nurse to review instructions and health history.  This assessment must be completed prior to your procedure.  Failure to complete the Nurse assessment may result in the procedure being cancelled.      On the day of your procedure, please designate an adult(s) who can drive you home stay with you for the next 24 hours. The medicines used in the exam will make you sleepy. You will not be able to drive.      You cannot take public transportation, ride share services, or non-medical taxi service without a responsible caregiver.  Medical transport services are allowed with the requirement that a responsible caregiver will receive you at your destination.  We require that drivers and caregivers are confirmed prior to your procedure.

## 2025-02-04 ENCOUNTER — TELEPHONE (OUTPATIENT)
Dept: GASTROENTEROLOGY | Facility: CLINIC | Age: 30
End: 2025-02-04
Payer: MEDICARE

## 2025-02-04 ENCOUNTER — VIRTUAL VISIT (OUTPATIENT)
Dept: PSYCHIATRY | Facility: CLINIC | Age: 30
End: 2025-02-04
Attending: PSYCHIATRY & NEUROLOGY
Payer: MEDICARE

## 2025-02-04 DIAGNOSIS — F41.9 ANXIETY: ICD-10-CM

## 2025-02-04 DIAGNOSIS — F90.9 ATTENTION DEFICIT HYPERACTIVITY DISORDER (ADHD), UNSPECIFIED ADHD TYPE: ICD-10-CM

## 2025-02-04 DIAGNOSIS — F43.10 POSTTRAUMATIC STRESS DISORDER: ICD-10-CM

## 2025-02-04 DIAGNOSIS — F12.20 CANNABIS DEPENDENCE (H): ICD-10-CM

## 2025-02-04 DIAGNOSIS — F31.9 BIPOLAR 1 DISORDER (H): Primary | ICD-10-CM

## 2025-02-04 DIAGNOSIS — K92.1 BLACK TARRY STOOLS: Primary | ICD-10-CM

## 2025-02-04 DIAGNOSIS — G47.00 INSOMNIA, UNSPECIFIED TYPE: ICD-10-CM

## 2025-02-04 DIAGNOSIS — F43.10 PTSD (POST-TRAUMATIC STRESS DISORDER): ICD-10-CM

## 2025-02-04 PROCEDURE — 90833 PSYTX W PT W E/M 30 MIN: CPT | Mod: 95

## 2025-02-04 PROCEDURE — G2211 COMPLEX E/M VISIT ADD ON: HCPCS | Mod: 95

## 2025-02-04 PROCEDURE — 98006 SYNCH AUDIO-VIDEO EST MOD 30: CPT | Mod: GC

## 2025-02-04 RX ORDER — POLYETHYLENE GLYCOL 3350 17 G/17G
POWDER, FOR SOLUTION ORAL
Qty: 238 G | Refills: 0 | Status: SHIPPED | OUTPATIENT
Start: 2025-02-04

## 2025-02-04 RX ORDER — DIVALPROEX SODIUM 250 MG/1
250 TABLET, FILM COATED, EXTENDED RELEASE ORAL AT BEDTIME
Qty: 30 TABLET | Refills: 1 | Status: SHIPPED | OUTPATIENT
Start: 2025-02-04

## 2025-02-04 RX ORDER — MAGNESIUM CARB/ALUMINUM HYDROX 105-160MG
296 TABLET,CHEWABLE ORAL ONCE
Qty: 296 ML | Refills: 0 | Status: SHIPPED | OUTPATIENT
Start: 2025-02-04 | End: 2025-02-04

## 2025-02-04 RX ORDER — BISACODYL 5 MG/1
TABLET, DELAYED RELEASE ORAL
Qty: 4 TABLET | Refills: 0 | Status: SHIPPED | OUTPATIENT
Start: 2025-02-04 | End: 2025-02-06

## 2025-02-04 RX ORDER — DIVALPROEX SODIUM 500 MG/1
500 TABLET, FILM COATED, EXTENDED RELEASE ORAL AT BEDTIME
Qty: 30 TABLET | Refills: 1 | Status: SHIPPED | OUTPATIENT
Start: 2025-02-04

## 2025-02-04 NOTE — TELEPHONE ENCOUNTER
Pre visit planning completed.      Procedure details:    Patient scheduled for Colonoscopy/Upper endoscopy (EGD) on 2/18/25.     Arrival time: 1130. Procedure time 1230    Facility location: [facility]; [facility] [place] [address]. Check in location: [facility].     Sedation type: MAC    Pre op exam needed? Yes. Patient needs to complete an in person pre-operative exam within 30 days of procedure. Informed patient pre op is needed via [employer]. and pre-assessment call.    Indication for procedure: screening, black stool intermittently      Chart review:     Electronic implanted devices? No    Recent diagnosis of diverticulitis within the last 6 weeks? No      Medication review:    Diabetic? No    Anticoagulants? No    Weight loss medication/injectable? No GLP-1 medication per patient's medication list. Nursing to verify with pre-assessment call.    Other medication HOLDING recommendations:  Cannabis/Marijuana: Stop night before procedure.      Prep for procedure:     Bowel prep recommendation: Standard Miralax.   Due to: standard bowel prep    Procedure information and instructions sent via [employer]         Surekha Eldridge RN  Endoscopy Procedure Pre Assessment   [phone] option 2

## 2025-02-04 NOTE — TELEPHONE ENCOUNTER
Pre visit planning completed.      Procedure details:    Patient scheduled for Colonoscopy/Upper endoscopy (EGD) on 2/18/25.     Arrival time: 1130. Procedure time 1230     Facility location: [facility]; [facility] [place] [address]. Check in location: [facility].      Sedation type: MAC. Did try to explain the reason for an adult to stay with patient for 24 hours after procedure. Patient states he doesn't understand why he needs a  after his procedure but he will say that he has someone to stay with him for 24 hours after the procedure.     Pre op exam needed? Yes. Patient needs to complete an in person pre-operative exam within 30 days of procedure. Informed patient pre op is needed via mychart. and pre-assessment call. Did inform the patient of need for pre-op.     Indication for procedure: screening, black stool intermittently      Chart review:     Electronic implanted devices? No    Recent diagnosis of diverticulitis within the last 6 weeks? No      Medication review:    Diabetic? No     Anticoagulants? No     Weight loss medication/injectable? No GLP-1 medication per patient's medication list. Nursing to verify with pre-assessment call.     Other medication HOLDING recommendations:  Cannabis/Marijuana: Stop night before procedure      Prep for procedure:     Discussed standard Miralax bowel prep with the patient. He verbalizes understanding. He states that he has conditions that make it so he cannot go a long time without food, but he will try to follow the diet. Explained that he should discuss that with his provider before the procedure.        Savanna Umanzor LPN  Endoscopy Procedure Pre Assessment   [phone] option 2

## 2025-02-04 NOTE — PROGRESS NOTES
Virtual Visit Details    Type of service:  Video Visit   Video Start Time:  8:34am  Video End Time: 9:38am    Originating Location (pt. Location): Home    Distant Location (provider location):  On-site  Platform used for Video Visit: Maurice

## 2025-02-04 NOTE — NURSING NOTE
Current patient location: Patient declined to provide     Is the patient currently in the state of [place]? YES    Visit mode: VIDEO    If the visit is dropped, the patient can be reconnected by:VIDEO VISIT: Send to e-mail at: [email or web address]    Will anyone else be joining the visit? NO  (If patient encounters technical issues they should call [phone])    Are changes needed to the allergy or medication list? Pt stated no changes to allergies and Pt stated no med changes    Are refills needed on medications prescribed by this physician? NO    Rooming Documentation:  Questionnaire(s) completed    Reason for visit: BLAINE Coleman VVF

## 2025-02-04 NOTE — PATIENT INSTRUCTIONS
Thank you for your visit today.      Treatment Plan Today:      1) Medications:   -Please do blood draw for Depakote. Please continue taking Depakote 500mg nightly and wait to start the extra 250mg of Depakote until you receive notification from me.    2) Follow-up appointment with Dr Ochoa in about 4 week.      3) In the case of an emergency, crisis numbers are below and clinic after hours number is [phone].    4) See link for skills on how to manage anxiety called TIPP skills: [email or web address]://[email or web address]/wp-content/uploads/sites/[email or web address]/TIP-Skills.pdf    **For crisis resources, please see the information at the end of this document**   Patient Education    Thank you for coming to the [facility] [place] [facility].     Lab Testing:  If you had lab testing today and your results are reassuring or normal they will be mailed to you or sent through [facility] within 7 days. If the lab tests need quick action we will call you with the results. The phone number we will call with results is # [phone]. If this is not the best number please call our clinic and change the number.     Medication Refills:  If you need any refills please call your pharmacy and they will contact us. Our fax number for refills is [phone].   Three business days of notice are needed for general medication refill requests.   Five business days of notice are needed for controlled substance refill requests.   If you need to change to a different pharmacy, please contact the new pharmacy directly. The new pharmacy will help you get your medications transferred.     Contact Us:  Please call [phone] during business hours (8-5:00 M-F).   If you have medication related questions after clinic hours, or on the weekend, please call [phone].     Financial Assistance [phone]   Medical Records [phone]       MENTAL HEALTH CRISIS RESOURCES:  For a emergency help, please call 911 or go to the nearest Emergency  Department.     Emergency Walk-In Options:   EmPATH Unit @ [facility] ([place]): [phone] - Specialized mental health emergency area designed to be calming  [facility] ([place]): [phone]  [facility] ([place]): [phone]  [facility] ([place]): [phone]    County Crisis Information:   [place]: [phone]  [place]: [phone]  [place] (COPE) - Adult: [phone]     Child: [phone]  [place] - Adult: [phone]     Child: [phone]  [place]: [phone]  List of all [facility] resources:   https://[place].[facility]/dhs/people-we-serve/adults/health-care/mental-health/resources/crisis-contacts.jsp    National Crisis Information:   Crisis Text Line: Text  [place]  to 173524  Suicide & Crisis Lifeline: 988  National Suicide Prevention Lifeline: 0-333-105-TALK (1-289.833.5129)       For online chat options, visit https://suicidepreventionlifeline.org/chat/  Poison Control Center: 1-676.484.9290  Trans Lifeline: 1-397.297.8743 - Hotline for transgender people of all ages  The [employer] Project: 4-356-574-9000 - Hotline for LGBT youth     For Non-Emergency Support:   Fast Tracker: Mental Health & Substance Use Disorder Resources -   https://www.WhatsNexxckermn.org/
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
## 2025-02-05 ENCOUNTER — LAB (OUTPATIENT)
Dept: LAB | Facility: CLINIC | Age: 30
End: 2025-02-05
Payer: MEDICARE

## 2025-02-05 ENCOUNTER — DOCUMENTATION ONLY (OUTPATIENT)
Dept: OTHER | Facility: CLINIC | Age: 30
End: 2025-02-05
Payer: MEDICARE

## 2025-02-05 DIAGNOSIS — Z11.4 ENCOUNTER FOR SCREENING FOR HUMAN IMMUNODEFICIENCY VIRUS (HIV): ICD-10-CM

## 2025-02-05 DIAGNOSIS — Z79.899 ENCOUNTER FOR LONG-TERM (CURRENT) USE OF MEDICATIONS: ICD-10-CM

## 2025-02-05 DIAGNOSIS — Z11.3 ROUTINE SCREENING FOR STI (SEXUALLY TRANSMITTED INFECTION): ICD-10-CM

## 2025-02-05 DIAGNOSIS — K92.1 BLOOD IN STOOL: ICD-10-CM

## 2025-02-05 LAB
ALBUMIN SERPL BCG-MCNC: 4.5 G/DL (ref 3.5–5.2)
ALP SERPL-CCNC: 68 U/L (ref 40–150)
ALT SERPL W P-5'-P-CCNC: 15 U/L (ref 0–70)
ANION GAP SERPL CALCULATED.3IONS-SCNC: 9 MMOL/L (ref 7–15)
AST SERPL W P-5'-P-CCNC: 21 U/L (ref 0–45)
BASOPHILS # BLD AUTO: 0 10E3/UL (ref 0–0.2)
BASOPHILS NFR BLD AUTO: 0 %
BILIRUB SERPL-MCNC: 0.3 MG/DL
BUN SERPL-MCNC: 17.3 MG/DL (ref 6–20)
CALCIUM SERPL-MCNC: 9.4 MG/DL (ref 8.8–10.4)
CHLORIDE SERPL-SCNC: 102 MMOL/L (ref 98–107)
CREAT SERPL-MCNC: 1.07 MG/DL (ref 0.67–1.17)
EGFRCR SERPLBLD CKD-EPI 2021: >90 ML/MIN/1.73M2
EOSINOPHIL # BLD AUTO: 0.1 10E3/UL (ref 0–0.7)
EOSINOPHIL NFR BLD AUTO: 1 %
ERYTHROCYTE [DISTWIDTH] IN BLOOD BY AUTOMATED COUNT: 12.2 % (ref 10–15)
GLUCOSE SERPL-MCNC: 103 MG/DL (ref 70–99)
HCO3 SERPL-SCNC: 29 MMOL/L (ref 22–29)
HCT VFR BLD AUTO: 46.2 % (ref 40–53)
HGB BLD-MCNC: 15.8 G/DL (ref 13.3–17.7)
IMM GRANULOCYTES # BLD: 0 10E3/UL
IMM GRANULOCYTES NFR BLD: 0 %
LYMPHOCYTES # BLD AUTO: 1.9 10E3/UL (ref 0.8–5.3)
LYMPHOCYTES NFR BLD AUTO: 17 %
MCH RBC QN AUTO: 29.5 PG (ref 26.5–33)
MCHC RBC AUTO-ENTMCNC: 34.2 G/DL (ref 31.5–36.5)
MCV RBC AUTO: 86 FL (ref 78–100)
MONOCYTES # BLD AUTO: 0.9 10E3/UL (ref 0–1.3)
MONOCYTES NFR BLD AUTO: 8 %
NEUTROPHILS # BLD AUTO: 8.5 10E3/UL (ref 1.6–8.3)
NEUTROPHILS NFR BLD AUTO: 74 %
NRBC # BLD AUTO: 0 10E3/UL
NRBC BLD AUTO-RTO: 0 /100
PLATELET # BLD AUTO: 218 10E3/UL (ref 150–450)
POTASSIUM SERPL-SCNC: 4.1 MMOL/L (ref 3.4–5.3)
PROT SERPL-MCNC: 7.1 G/DL (ref 6.4–8.3)
RBC # BLD AUTO: 5.35 10E6/UL (ref 4.4–5.9)
SODIUM SERPL-SCNC: 140 MMOL/L (ref 135–145)
T PALLIDUM AB SER QL: NONREACTIVE
VALPROATE SERPL-MCNC: 23.8 UG/ML
WBC # BLD AUTO: 11.4 10E3/UL (ref 4–11)

## 2025-02-05 PROCEDURE — 36415 COLL VENOUS BLD VENIPUNCTURE: CPT | Performed by: PATHOLOGY

## 2025-02-05 PROCEDURE — 99000 SPECIMEN HANDLING OFFICE-LAB: CPT | Performed by: PATHOLOGY

## 2025-02-05 PROCEDURE — 86780 TREPONEMA PALLIDUM: CPT | Performed by: FAMILY MEDICINE

## 2025-02-05 PROCEDURE — 86364 TISS TRNSGLTMNASE EA IG CLAS: CPT | Performed by: FAMILY MEDICINE

## 2025-02-05 PROCEDURE — 87389 HIV-1 AG W/HIV-1&-2 AB AG IA: CPT | Performed by: FAMILY MEDICINE

## 2025-02-05 PROCEDURE — 80053 COMPREHEN METABOLIC PANEL: CPT | Performed by: PATHOLOGY

## 2025-02-05 PROCEDURE — 85025 COMPLETE CBC W/AUTO DIFF WBC: CPT | Performed by: PATHOLOGY

## 2025-02-05 PROCEDURE — 80164 ASSAY DIPROPYLACETIC ACD TOT: CPT

## 2025-02-06 ENCOUNTER — OFFICE VISIT (OUTPATIENT)
Dept: FAMILY MEDICINE | Facility: CLINIC | Age: 30
End: 2025-02-06
Payer: MEDICARE

## 2025-02-06 VITALS
TEMPERATURE: 97.4 F | HEART RATE: 61 BPM | RESPIRATION RATE: 16 BRPM | WEIGHT: 237 LBS | SYSTOLIC BLOOD PRESSURE: 120 MMHG | HEIGHT: 69 IN | BODY MASS INDEX: 35.1 KG/M2 | DIASTOLIC BLOOD PRESSURE: 78 MMHG | OXYGEN SATURATION: 98 %

## 2025-02-06 DIAGNOSIS — K92.1 BLOOD IN STOOL: Primary | ICD-10-CM

## 2025-02-06 DIAGNOSIS — Z23 NEED FOR VACCINATION: ICD-10-CM

## 2025-02-06 DIAGNOSIS — Z01.818 PREOPERATIVE EXAMINATION: ICD-10-CM

## 2025-02-06 LAB
HIV 1+2 AB+HIV1 P24 AG SERPL QL IA: NONREACTIVE
TTG IGA SER-ACNC: 0.4 U/ML
TTG IGG SER-ACNC: <0.6 U/ML

## 2025-02-06 PROCEDURE — 99213 OFFICE O/P EST LOW 20 MIN: CPT | Performed by: FAMILY MEDICINE

## 2025-02-06 RX ORDER — BISACODYL 5 MG/1
TABLET, DELAYED RELEASE ORAL
Qty: 4 TABLET | Refills: 0 | Status: SHIPPED | OUTPATIENT
Start: 2025-02-06

## 2025-02-06 NOTE — PROGRESS NOTES
"  {PROVIDER CHARTING PREFERENCE:550034}    Subjective   Alexis is a 29 year old, presenting for the following health issues:    Pre-Op Exam (02/18 colonoscopy [facility])  {(!) Visit Details have not yet been documented.  Please enter Visit Details and then use this list to pull in documentation. (Optional):447434}  HPI     {MA/LPN/RN Pre-Provider Visit Orders- hCG/UA/Strep (Optional):280404}  {SUPERLIST (Optional):051146}  {additonal problems for provider to add (Optional):139091}    {ROS Picklists (Optional):370193}      Objective    /78 (BP Location: Left arm, Patient Position: Sitting, Cuff Size: Adult Regular)   Pulse 61   Temp 97.4  F (36.3  C) (Temporal)   Resp 16   Ht 1.751 m (5' 8.94\")   Wt 107.5 kg (237 lb)   SpO2 98%   BMI 35.06 kg/m    Body mass index is 35.06 kg/m .  Physical Exam   {Exam List (Optional):173989}    {Diagnostic Test Results (Optional):332760}        Signed Electronically by: Zoran Steinberg DO  {Email feedback regarding this note to [email or web address]   :166941}  "

## 2025-02-06 NOTE — PROGRESS NOTES
Preoperative Evaluation  [facility]  [address]  [address]  [facility] [address]  Phone: [phone]  Fax: [phone]  Primary Provider: Zoran Steinberg DO  Pre-op Performing Provider: Zoran Steinberg DO  Feb 6, 2025   {Provider  Link to PREOP SmartSet  REQUIRED to apply standard patient instructions and medication directions to the AVS :057996}  {ROOMER review and update patient entered surgical information if needed :600678}        2/6/2025   Surgical Information   What procedure is being done? colonoscopy   Facility or Hospital where procedure/surgery will be performed: [facility]   Who is doing the procedure / surgery? Dr Melvin Tapia   Date of surgery / procedure: 02/18/2025   Time of surgery / procedure: unknown   Where do you plan to recover after surgery? at home alone     Fax number for surgical facility: Note does not need to be faxed, will be available electronically in Epic.    Assessment & Plan     The proposed surgical procedure is considered LOW risk.    Blood in stool  Preoperative examination  Low risk for low risk procedure   No cannabis for 24 hours prior to anesthesia   Avoid nsaids 7 days           - No identified additional risk factors other than previously addressed      Recommendation  Approval given to proceed with proposed procedure, without further diagnostic evaluation.    Gina Espinoza is a 29 year old, presenting for the following:  Pre-Op Exam (02/18 colonoscopy [facility] Dr Melvin Tapia)      HPI related to upcoming procedure: intermittent blood in stool  Not currently active     Has primary progressive MS   Has not had symptoms for over a year         2/6/2025   Pre-Op Questionnaire   Have you ever had a heart attack or stroke? No   Have you ever had surgery on your heart or blood vessels, such as a stent placement, a coronary artery bypass, or surgery on an artery in your head, neck, heart, or legs? No   Do you have chest pain with  activity? No   Do you have a history of heart failure? No   Do you currently have a cold, bronchitis or symptoms of other infection? No   Do you have a cough, shortness of breath, or wheezing? No   Do you or anyone in your family have previous history of blood clots? No   Do you or does anyone in your family have a serious bleeding problem such as prolonged bleeding following surgeries or cuts? No   Have you ever had problems with anemia or been told to take iron pills? No   Have you had any abnormal blood loss such as black, tarry or bloody stools? (!) YES this is why we are getting the colonoscopy    Have you ever had a blood transfusion? No   Are you willing to have a blood transfusion if it is medically needed before, during, or after your surgery? Yes   Have you or any of your relatives ever had problems with anesthesia? No   Do you have sleep apnea, excessive snoring or daytime drowsiness? No   Do you have any artifical heart valves or other implanted medical devices like a pacemaker, defibrillator, or continuous glucose monitor? No   Do you have artificial joints? No   Are you allergic to latex? No     Health Care Directive  FULL CODE     Preoperative Review of   Uses cannabis     Patient Active Problem List    Diagnosis Date Noted    Tobacco use disorder 08/08/2022     Priority: Medium    Posttraumatic stress disorder 07/20/2022     Priority: Medium    ADHD 07/20/2022     Priority: Medium     Last Assessment & Plan:   Diagnosed as a child, he has been taking adderal XR 10 mg regular and 5 mg immediate release daily. He has been without for about a month. [facility] pharmacy was called, they have his prescriptions, he was cabbed over to pick them up.        Bipolar 1 disorder (H) 10/23/2020     Priority: Medium    Suicidal behavior 10/23/2020     Priority: Medium    Left foot pain 06/30/2020     Priority: Medium     Formatting of this note might be different from the original.  Added  automatically from request for surgery [id number]      Tailor's bunion of left foot 06/30/2020     Priority: Medium     Formatting of this note might be different from the original.  Added automatically from request for surgery [id number]      Obesity with body mass index of 30.0-39.9 06/17/2019     Priority: Medium    Anxiety 01/24/2019     Priority: Medium    Insomnia 01/24/2019     Priority: Medium    Multiple sclerosis (H) 01/24/2019     Priority: Medium      No past medical history on file.  No past surgical history on file.  Current Outpatient Medications   Medication Sig Dispense Refill    acetaminophen (TYLENOL) 325 MG tablet Take 650 mg by mouth every 6 hours as needed      bisacodyl (DULCOLAX) 5 MG EC tablet Take as directed. One day before the exam at 4 PM, take 2 Dulcolax (Bisacodyl) tablets.  At 10 PM take 2 Dulcolax (Bisacodyl) tablets. (Patient not taking: Reported on 2/6/2025) 4 tablet 0    Cholecalciferol 100 MCG (4000 UT) CAPS Take 4,000 Units by mouth daily. (Patient not taking: Reported on 2/6/2025)      divalproex sodium extended-release (DEPAKOTE ER) 250 MG 24 hr tablet Take 1 tablet (250 mg) by mouth at bedtime. with 500mg for a total dose of 750mg. Wait to start until after you receive call from Dr. Ochoa. 30 tablet 1    divalproex sodium extended-release (DEPAKOTE ER) 500 MG 24 hr tablet Take 1 tablet (500 mg) by mouth at bedtime. With 250mg tablet for a total of 750mg daily. 30 tablet 1    NONFORMULARY Patient states he uses medical cannibis as needed      ocrelizumab (OCREVUS) 300 MG/10ML injection Inject 600 mg into the vein every 6 months.      Omega-3 Fish Oil 500 MG capsule Take 2 capsules (1,000 mg) by mouth daily 180 capsule 3    polyethylene glycol (MIRALAX) 17 GM/Dose powder Day before exam at 5 PM start drinking an 8-ounce glass of the Miralax and Gatorade mixture every 15 minutes until the pitcher is HALF empty.  Store the rest in the refrigerator. At 10 PM start drinking an 8-ounce  "glass of Miralax and Gatorade mixture every 15 minutes until the pitcher is empty (about 4 glasses). (Patient not taking: Reported on 2025) 238 g 0       Allergies   Allergen Reactions    Cocoa Butter Other (See Comments)     Other reaction(s): Other (See Comments)  Skin irritation with older or , fresh is ok   Skin irritation      Corticosteroids Other (See Comments)     Other reaction(s): With long term prednisone usage    Steroids \"homicidal rage.\"  States 'rage'    Sulfa Antibiotics Diarrhea     Bloating, gas  Bloating, gas    Tilactase Diarrhea     Bloating, gas        Social History     Tobacco Use    Smoking status: Former     Types: Cigarettes    Smokeless tobacco: Never   Substance Use Topics    Alcohol use: Yes     Comment: SOCIAL     History   Drug Use    Frequency: 7.0 times per week    Types: Marijuana       Objective    /78 (BP Location: Left arm, Patient Position: Sitting, Cuff Size: Adult Regular)   Pulse 61   Temp 97.4  F (36.3  C) (Temporal)   Resp 16   Ht 1.751 m (5' 8.94\")   Wt 107.5 kg (237 lb)   SpO2 98%   BMI 35.06 kg/m     Estimated body mass index is 35.06 kg/m  as calculated from the following:    Height as of this encounter: 1.751 m (5' 8.94\").    Weight as of this encounter: 107.5 kg (237 lb).  Physical Exam    Recent Labs   Lab Test 25  1613 24  1427   HGB 15.8 15.4    247     --    POTASSIUM 4.1  --    CR 1.07  --         Diagnostics  No labs were ordered during this visit.   No EKG required for low risk surgery (cataract, skin procedure, breast biopsy, etc).    Revised Cardiac Risk Index (RCRI)  The patient has the following serious cardiovascular risks for perioperative complications:   - No serious cardiac risks = 0 points     RCRI Interpretation: 0 points: Class I (very low risk - 0.4% complication rate)         Signed Electronically by: Zoran Steinberg DO  A copy of this evaluation report is provided to the requesting physician.   "   {Email feedback regarding this note to [email or web address]   :[id number]}

## 2025-02-18 ENCOUNTER — HOSPITAL ENCOUNTER (OUTPATIENT)
Facility: CLINIC | Age: 30
Discharge: HOME | End: 2025-02-18
Attending: INTERNAL MEDICINE | Admitting: INTERNAL MEDICINE
Payer: MEDICARE

## 2025-02-18 ENCOUNTER — ANESTHESIA EVENT (OUTPATIENT)
Dept: GASTROENTEROLOGY | Facility: CLINIC | Age: 30
End: 2025-02-18
Payer: MEDICARE

## 2025-02-18 ENCOUNTER — ANESTHESIA (OUTPATIENT)
Dept: GASTROENTEROLOGY | Facility: CLINIC | Age: 30
End: 2025-02-18
Payer: MEDICARE

## 2025-02-18 VITALS
BODY MASS INDEX: 36.88 KG/M2 | HEIGHT: 67 IN | DIASTOLIC BLOOD PRESSURE: 83 MMHG | HEART RATE: 60 BPM | SYSTOLIC BLOOD PRESSURE: 122 MMHG | OXYGEN SATURATION: 96 % | WEIGHT: 235 LBS | RESPIRATION RATE: 25 BRPM

## 2025-02-18 DIAGNOSIS — K20.90 ESOPHAGITIS DETERMINED BY ENDOSCOPY: Primary | ICD-10-CM

## 2025-02-18 LAB
COLONOSCOPY: NORMAL
UPPER GI ENDOSCOPY: NORMAL

## 2025-02-18 PROCEDURE — 258N000003 HC RX IP 258 OP 636: Performed by: NURSE ANESTHETIST, CERTIFIED REGISTERED

## 2025-02-18 PROCEDURE — 250N000009 HC RX 250: Performed by: NURSE ANESTHETIST, CERTIFIED REGISTERED

## 2025-02-18 PROCEDURE — 45385 COLONOSCOPY W/LESION REMOVAL: CPT | Performed by: INTERNAL MEDICINE

## 2025-02-18 PROCEDURE — 88305 TISSUE EXAM BY PATHOLOGIST: CPT | Mod: TC | Performed by: INTERNAL MEDICINE

## 2025-02-18 PROCEDURE — 43239 EGD BIOPSY SINGLE/MULTIPLE: CPT | Performed by: INTERNAL MEDICINE

## 2025-02-18 PROCEDURE — 250N000011 HC RX IP 250 OP 636: Performed by: NURSE ANESTHETIST, CERTIFIED REGISTERED

## 2025-02-18 PROCEDURE — 370N000017 HC ANESTHESIA TECHNICAL FEE, PER MIN: Performed by: INTERNAL MEDICINE

## 2025-02-18 PROCEDURE — 999N000010 HC STATISTIC ANES STAT CODE-CRNA PER MINUTE: Performed by: INTERNAL MEDICINE

## 2025-02-18 RX ORDER — HYDROXYZINE HYDROCHLORIDE 25 MG/1
25 TABLET, FILM COATED ORAL EVERY 6 HOURS PRN
Status: DISCONTINUED | OUTPATIENT
Start: 2025-02-18 | End: 2025-02-18 | Stop reason: HOSPADM

## 2025-02-18 RX ORDER — HYDROMORPHONE HCL IN WATER/PF 6 MG/30 ML
0.4 PATIENT CONTROLLED ANALGESIA SYRINGE INTRAVENOUS EVERY 5 MIN PRN
Status: DISCONTINUED | OUTPATIENT
Start: 2025-02-18 | End: 2025-02-18 | Stop reason: HOSPADM

## 2025-02-18 RX ORDER — DEXAMETHASONE SODIUM PHOSPHATE 4 MG/ML
4 INJECTION, SOLUTION INTRA-ARTICULAR; INTRALESIONAL; INTRAMUSCULAR; INTRAVENOUS; SOFT TISSUE
Status: DISCONTINUED | OUTPATIENT
Start: 2025-02-18 | End: 2025-02-18 | Stop reason: HOSPADM

## 2025-02-18 RX ORDER — SODIUM CHLORIDE, SODIUM LACTATE, POTASSIUM CHLORIDE, CALCIUM CHLORIDE 600; 310; 30; 20 MG/100ML; MG/100ML; MG/100ML; MG/100ML
INJECTION, SOLUTION INTRAVENOUS CONTINUOUS PRN
Status: DISCONTINUED | OUTPATIENT
Start: 2025-02-18 | End: 2025-02-18

## 2025-02-18 RX ORDER — FENTANYL CITRATE 50 UG/ML
50 INJECTION, SOLUTION INTRAMUSCULAR; INTRAVENOUS EVERY 5 MIN PRN
Status: DISCONTINUED | OUTPATIENT
Start: 2025-02-18 | End: 2025-02-18 | Stop reason: HOSPADM

## 2025-02-18 RX ORDER — NALOXONE HYDROCHLORIDE 0.4 MG/ML
0.1 INJECTION, SOLUTION INTRAMUSCULAR; INTRAVENOUS; SUBCUTANEOUS
Status: DISCONTINUED | OUTPATIENT
Start: 2025-02-18 | End: 2025-02-18 | Stop reason: HOSPADM

## 2025-02-18 RX ORDER — FENTANYL CITRATE 50 UG/ML
25 INJECTION, SOLUTION INTRAMUSCULAR; INTRAVENOUS EVERY 5 MIN PRN
Status: DISCONTINUED | OUTPATIENT
Start: 2025-02-18 | End: 2025-02-18 | Stop reason: HOSPADM

## 2025-02-18 RX ORDER — OMEPRAZOLE 20 MG/1
20 CAPSULE, DELAYED RELEASE ORAL DAILY
Qty: 90 CAPSULE | Refills: 3 | Status: SHIPPED | OUTPATIENT
Start: 2025-02-18 | End: 2026-02-18

## 2025-02-18 RX ORDER — LABETALOL HYDROCHLORIDE 5 MG/ML
10 INJECTION, SOLUTION INTRAVENOUS
Status: DISCONTINUED | OUTPATIENT
Start: 2025-02-18 | End: 2025-02-18 | Stop reason: HOSPADM

## 2025-02-18 RX ORDER — ACETAMINOPHEN 325 MG/1
975 TABLET ORAL ONCE
Status: DISCONTINUED | OUTPATIENT
Start: 2025-02-18 | End: 2025-02-18 | Stop reason: HOSPADM

## 2025-02-18 RX ORDER — HYDROMORPHONE HCL IN WATER/PF 6 MG/30 ML
0.2 PATIENT CONTROLLED ANALGESIA SYRINGE INTRAVENOUS EVERY 5 MIN PRN
Status: DISCONTINUED | OUTPATIENT
Start: 2025-02-18 | End: 2025-02-18 | Stop reason: HOSPADM

## 2025-02-18 RX ORDER — DEXMEDETOMIDINE HYDROCHLORIDE 4 UG/ML
INJECTION, SOLUTION INTRAVENOUS PRN
Status: DISCONTINUED | OUTPATIENT
Start: 2025-02-18 | End: 2025-02-18

## 2025-02-18 RX ORDER — SODIUM CHLORIDE, SODIUM LACTATE, POTASSIUM CHLORIDE, CALCIUM CHLORIDE 600; 310; 30; 20 MG/100ML; MG/100ML; MG/100ML; MG/100ML
INJECTION, SOLUTION INTRAVENOUS CONTINUOUS
Status: DISCONTINUED | OUTPATIENT
Start: 2025-02-18 | End: 2025-02-18 | Stop reason: HOSPADM

## 2025-02-18 RX ORDER — ONDANSETRON 4 MG/1
4 TABLET, ORALLY DISINTEGRATING ORAL EVERY 30 MIN PRN
Status: DISCONTINUED | OUTPATIENT
Start: 2025-02-18 | End: 2025-02-18 | Stop reason: HOSPADM

## 2025-02-18 RX ORDER — ONDANSETRON 2 MG/ML
4 INJECTION INTRAMUSCULAR; INTRAVENOUS EVERY 30 MIN PRN
Status: DISCONTINUED | OUTPATIENT
Start: 2025-02-18 | End: 2025-02-18 | Stop reason: HOSPADM

## 2025-02-18 RX ORDER — PROPOFOL 10 MG/ML
INJECTION, EMULSION INTRAVENOUS PRN
Status: DISCONTINUED | OUTPATIENT
Start: 2025-02-18 | End: 2025-02-18

## 2025-02-18 RX ORDER — LIDOCAINE HYDROCHLORIDE 20 MG/ML
INJECTION, SOLUTION INFILTRATION; PERINEURAL PRN
Status: DISCONTINUED | OUTPATIENT
Start: 2025-02-18 | End: 2025-02-18

## 2025-02-18 RX ORDER — HYDRALAZINE HYDROCHLORIDE 20 MG/ML
2.5-5 INJECTION INTRAMUSCULAR; INTRAVENOUS EVERY 10 MIN PRN
Status: DISCONTINUED | OUTPATIENT
Start: 2025-02-18 | End: 2025-02-18 | Stop reason: HOSPADM

## 2025-02-18 RX ADMIN — DEXMEDETOMIDINE HYDROCHLORIDE 4 MCG: 4 INJECTION, SOLUTION INTRAVENOUS at 12:54

## 2025-02-18 RX ADMIN — DEXMEDETOMIDINE HYDROCHLORIDE 8 MCG: 4 INJECTION, SOLUTION INTRAVENOUS at 12:50

## 2025-02-18 RX ADMIN — PROPOFOL 100 MG: 10 INJECTION, EMULSION INTRAVENOUS at 12:49

## 2025-02-18 RX ADMIN — DEXMEDETOMIDINE HYDROCHLORIDE 8 MCG: 4 INJECTION, SOLUTION INTRAVENOUS at 12:52

## 2025-02-18 RX ADMIN — PROPOFOL 200 MCG/KG/MIN: 10 INJECTION, EMULSION INTRAVENOUS at 12:50

## 2025-02-18 RX ADMIN — LIDOCAINE HYDROCHLORIDE 60 MG: 20 INJECTION, SOLUTION INFILTRATION; PERINEURAL at 12:49

## 2025-02-18 RX ADMIN — SODIUM CHLORIDE, POTASSIUM CHLORIDE, SODIUM LACTATE AND CALCIUM CHLORIDE: 600; 310; 30; 20 INJECTION, SOLUTION INTRAVENOUS at 12:44

## 2025-02-18 ASSESSMENT — ACTIVITIES OF DAILY LIVING (ADL)
ADLS_ACUITY_SCORE: 41

## 2025-02-18 ASSESSMENT — LIFESTYLE VARIABLES: TOBACCO_USE: 1

## 2025-02-18 NOTE — ANESTHESIA POSTPROCEDURE EVALUATION
Patient: Guzman Davidson    Procedure: Procedure(s):  COLONOSCOPY, FLEXIBLE, WITH LESION REMOVAL USING SNARE  ESOPHAGOGASTRODUODENOSCOPY, WITH BIOPSY       Anesthesia Type:  MAC    Note:  Disposition: Outpatient   Postop Pain Control: Uneventful            Sign Out: Well controlled pain   PONV: No   Neuro/Psych: Uneventful            Sign Out: Acceptable/Baseline neuro status   Airway/Respiratory: Uneventful            Sign Out: Acceptable/Baseline resp. status   CV/Hemodynamics: Uneventful            Sign Out: Acceptable CV status   Other NRE: NONE   DID A NON-ROUTINE EVENT OCCUR?            Last vitals:  Vitals Value Taken Time   /64 02/18/25 1333   Temp     Pulse 70 02/18/25 1347   Resp 12 02/18/25 1347   SpO2 97 % 02/18/25 1347   Vitals shown include unfiled device data.    Electronically Signed By: Ryan Zapata MD  February 18, 2025  1:49 PM

## 2025-02-18 NOTE — ANESTHESIA PREPROCEDURE EVALUATION
"Anesthesia Pre-Procedure Evaluation    Patient: Guzman Davidson   MRN: [id number] : 1995        Procedure : Procedure(s):  Colonoscopy  Esophagoscopy, gastroscopy, duodenoscopy (EGD), combined          No past medical history on file.   No past surgical history on file.   Allergies   Allergen Reactions    Cocoa Butter Other (See Comments)     Other reaction(s): Other (See Comments)  Skin irritation with older or , fresh is ok   Skin irritation      Corticosteroids Other (See Comments)     Other reaction(s): With long term prednisone usage    Steroids \"homicidal rage.\"  States 'rage'    Sulfa Antibiotics Diarrhea     Bloating, gas  Bloating, gas    Tilactase Diarrhea     Bloating, gas      Social History     Tobacco Use    Smoking status: Former     Types: Cigarettes    Smokeless tobacco: Never   Substance Use Topics    Alcohol use: Yes     Comment: SOCIAL      Wt Readings from Last 1 Encounters:   25 107.5 kg (237 lb)        Anesthesia Evaluation   Pt has had prior anesthetic. Type: General.    No history of anesthetic complications       ROS/MED HX  ENT/Pulmonary:     (+)                tobacco use, Past use,                       Neurologic:     (+)                   Multiple Sclerosis, limitations: primary progressive MS, has not had symptoms for over a year.            Cardiovascular:       METS/Exercise Tolerance:     Hematologic:       Musculoskeletal:       GI/Hepatic:       Renal/Genitourinary:       Endo:     (+)               Obesity (Class 2),       Psychiatric/Substance Use:     (+) psychiatric history anxiety, depression, other (comment) and bipolar (PTSD; Suicidal behavior)   Recreational drug usage: Cannabis.    Infectious Disease:       Malignancy:       Other:               OUTSIDE LABS:  CBC:   Lab Results   Component Value Date    WBC 11.4 (H) 2025    WBC 7.8 2024    HGB 15.8 2025    HGB 15.4 2024    HCT 46.2 2025    HCT 43.1 2024     " "02/05/2025     12/23/2024     BMP:   Lab Results   Component Value Date     02/05/2025    POTASSIUM 4.1 02/05/2025    CHLORIDE 102 02/05/2025    CO2 29 02/05/2025    BUN 17.3 02/05/2025    CR 1.07 02/05/2025     (H) 02/05/2025     COAGS: No results found for: \"PTT\", \"INR\", \"FIBR\"  POC: No results found for: \"BGM\", \"HCG\", \"HCGS\"  HEPATIC:   Lab Results   Component Value Date    ALBUMIN 4.5 02/05/2025    PROTTOTAL 7.1 02/05/2025    ALT 15 02/05/2025    AST 21 02/05/2025    ALKPHOS 68 02/05/2025    BILITOTAL 0.3 02/05/2025     OTHER:   Lab Results   Component Value Date    LATESHA 9.4 02/05/2025       Anesthesia Plan    ASA Status:  3    NPO Status:  NPO Appropriate    Anesthesia Type: MAC.     - Reason for MAC: straight local not clinically adequate              Consents    Anesthesia Plan(s) and associated risks, benefits, and realistic alternatives discussed. Questions answered and patient/representative(s) expressed understanding.     - Discussed:     - Discussed with:  Patient            Postoperative Care            Comments:               Donald Davidson MD    I have reviewed the pertinent notes and labs in the chart from the past 30 days and (re)examined the patient.  Any updates or changes from those notes are reflected in this note.    Clinically Significant Risk Factors Present on Admission                             # Obesity: Estimated body mass index is 35.06 kg/m  as calculated from the following:    Height as of 2/6/25: 1.751 m (5' 8.94\").    Weight as of 2/6/25: 107.5 kg (237 lb).                "

## 2025-02-18 NOTE — ANESTHESIA CARE TRANSFER NOTE
Patient: Guzman Davidson    Procedure: Procedure(s):  COLONOSCOPY, FLEXIBLE, WITH LESION REMOVAL USING SNARE  ESOPHAGOGASTRODUODENOSCOPY, WITH BIOPSY       Diagnosis: Blood in stool [K92.1]  Diagnosis Additional Information: No value filed.    Anesthesia Type:   MAC     Note:    Oropharynx: oropharynx clear of all foreign objects and spontaneously breathing  Level of Consciousness: drowsy  Oxygen Supplementation: nasal cannula  Level of Supplemental Oxygen (L/min / FiO2): 4  Independent Airway: airway patency satisfactory and stable  Dentition: dentition unchanged  Vital Signs Stable: post-procedure vital signs reviewed and stable  Report to RN Given: handoff report given  Patient transferred to: PACU    Handoff Report: Identifed the Patient, Identified the Reponsible Provider, Reviewed the pertinent medical history, Discussed the surgical course, Reviewed Intra-OP anesthesia mangement and issues during anesthesia, Set expectations for post-procedure period and Allowed opportunity for questions and acknowledgement of understanding      Vitals:  Vitals Value Taken Time   /53 02/18/25 1318   Temp     Pulse 86 02/18/25 1320   Resp 21 02/18/25 1320   SpO2 92 % 02/18/25 1320   Vitals shown include unfiled device data.    Electronically Signed By: Robin Chance APRN CRNA  February 18, 2025  1:21 PM

## 2025-02-18 NOTE — H&P
"[facility]  Pre-Endoscopy History and Physical     Guzman Davidson MRN# [id number]   YOB: 1995 Age: 29 year old     Date of Procedure: 2025  Primary care provider: Zoran Steinberg  Type of Endoscopy: colonoscopy and esophagogastroduodenoscopy (upper GI endoscopy)  Reason for Procedure: melena/blood in stool  Type of Anesthesia Anticipated: MAC    HPI:    Guzman is a 29 year old male who will be undergoing the above procedure.      A history and physical has been performed. The patient's medications and allergies have been reviewed. The risks and benefits of the procedure and the sedation options and risks were discussed with the patient.  All questions were answered and informed consent was obtained.      He denies a personal or family history of anesthesia complications or bleeding disorders.     Allergies   Allergen Reactions    Cocoa Butter Other (See Comments)     Other reaction(s): Other (See Comments)  Skin irritation with older or , fresh is ok   Skin irritation      Corticosteroids Other (See Comments)     Other reaction(s): With long term prednisone usage    Steroids \"homicidal rage.\"  States 'rage'    Sulfa Antibiotics Diarrhea     Bloating, gas  Bloating, gas    Tilactase Diarrhea     Bloating, gas        Prior to Admission Medications   Prescriptions Last Dose Informant Patient Reported? Taking?   NONFORMULARY   Yes No   Sig: Patient states he uses medical cannibis as needed   Omega-3 Fish Oil 500 MG capsule Past Week  No Yes   Sig: Take 2 capsules (1,000 mg) by mouth daily   acetaminophen (TYLENOL) 325 MG tablet Past Week  Yes Yes   Sig: Take 650 mg by mouth every 6 hours as needed   bisacodyl (DULCOLAX) 5 MG EC tablet   No No   Sig: Take as directed. One day before the exam at 4 PM, take 2 Dulcolax (Bisacodyl) tablets.  At 10 PM take 2 Dulcolax (Bisacodyl) tablets.   divalproex sodium extended-release (DEPAKOTE ER) 250 MG 24 hr tablet 2025  No " "Yes   Sig: Take 1 tablet (250 mg) by mouth at bedtime. with 500mg for a total dose of 750mg. Wait to start until after you receive call from Dr. Ochoa.   divalproex sodium extended-release (DEPAKOTE ER) 500 MG 24 hr tablet 2/17/2025  No Yes   Sig: Take 1 tablet (500 mg) by mouth at bedtime. With 250mg tablet for a total of 750mg daily.   ocrelizumab (OCREVUS) 300 MG/10ML injection More than a month  Yes Yes   Sig: Inject 600 mg into the vein every 6 months.   polyethylene glycol (MIRALAX) 17 GM/Dose powder   No No   Sig: Day before exam at 5 PM start drinking an 8-ounce glass of the Miralax and Gatorade mixture every 15 minutes until the pitcher is HALF empty.  Store the rest in the refrigerator. At 10 PM start drinking an 8-ounce glass of Miralax and Gatorade mixture every 15 minutes until the pitcher is empty (about 4 glasses).   Patient not taking: Reported on 2/6/2025      Facility-Administered Medications: None       Patient Active Problem List   Diagnosis    Anxiety    Bipolar 1 disorder (H)    Insomnia    Left foot pain    Multiple sclerosis (H)    Obesity with body mass index of 30.0-39.9    Suicidal behavior    Tailor's bunion of left foot    Tobacco use disorder    Posttraumatic stress disorder    ADHD        Past Medical History:   Diagnosis Date    Bipolar 2 disorder (H)     Uncomplicated asthma     childhood asthma        History reviewed. No pertinent surgical history.    Social History     Tobacco Use    Smoking status: Former     Types: Cigarettes    Smokeless tobacco: Never   Substance Use Topics    Alcohol use: Yes     Comment: drink with dinner       History reviewed. No pertinent family history.    REVIEW OF SYSTEMS:     5 point ROS negative except as noted above in HPI, including Gen., Resp., CV, GI &  system review.      PHYSICAL EXAM:   BP (!) 148/86   Pulse 71   Resp 16   Ht 1.702 m (5' 7\")   Wt 106.6 kg (235 lb)   SpO2 99%   BMI 36.81 kg/m   Estimated body mass index is 36.81 kg/m  " "as calculated from the following:    Height as of this encounter: 1.702 m (5' 7\").    Weight as of this encounter: 106.6 kg (235 lb).   GENERAL APPEARANCE: healthy, alert, and no distress  MENTAL STATUS: alert  AIRWAY EXAM: Mallampatti per anesthesia  RESP: lungs clear to auscultation - no rales, rhonchi or wheezes  CV: regular rates and rhythm      DIAGNOSTICS:    Not indicated      IMPRESSION   ASA Class per anesthesia        PLAN:       Plan for colonoscopy and esophagogastroduodenoscopy (upper GI endoscopy). We discussed the risks, benefits and alternatives and the patient wished to proceed.    The above has been forwarded to the consulting provider.      Signed Electronically by: Melvin Tapia MD  February 18, 2025    Melvin Tapia MD  [facility]  Cell: [phone]  Office Phone: [phone]  Office Fax: [phone]        "

## 2025-02-21 LAB
PATH REPORT.COMMENTS IMP SPEC: NORMAL
PATH REPORT.COMMENTS IMP SPEC: NORMAL
PATH REPORT.FINAL DX SPEC: NORMAL
PATH REPORT.GROSS SPEC: NORMAL
PATH REPORT.MICROSCOPIC SPEC OTHER STN: NORMAL
PATH REPORT.RELEVANT HX SPEC: NORMAL
PHOTO IMAGE: NORMAL

## 2025-02-21 PROCEDURE — 88305 TISSUE EXAM BY PATHOLOGIST: CPT | Mod: 26 | Performed by: PATHOLOGY

## 2025-03-04 LAB — COLONOSCOPY: NORMAL

## 2025-03-11 ENCOUNTER — VIRTUAL VISIT (OUTPATIENT)
Dept: PSYCHIATRY | Facility: CLINIC | Age: 30
End: 2025-03-11
Attending: PSYCHIATRY & NEUROLOGY
Payer: MEDICARE

## 2025-03-11 DIAGNOSIS — F43.10 POSTTRAUMATIC STRESS DISORDER: ICD-10-CM

## 2025-03-11 DIAGNOSIS — G47.00 INSOMNIA, UNSPECIFIED TYPE: ICD-10-CM

## 2025-03-11 DIAGNOSIS — Z79.899 ENCOUNTER FOR LONG-TERM (CURRENT) USE OF MEDICATIONS: ICD-10-CM

## 2025-03-11 DIAGNOSIS — F12.20 CANNABIS DEPENDENCE (H): ICD-10-CM

## 2025-03-11 DIAGNOSIS — F41.9 ANXIETY: ICD-10-CM

## 2025-03-11 DIAGNOSIS — F31.9 BIPOLAR 1 DISORDER (H): ICD-10-CM

## 2025-03-11 DIAGNOSIS — F90.9 ATTENTION DEFICIT HYPERACTIVITY DISORDER (ADHD), UNSPECIFIED ADHD TYPE: Primary | ICD-10-CM

## 2025-03-11 PROCEDURE — 98006 SYNCH AUDIO-VIDEO EST MOD 30: CPT | Mod: GC

## 2025-03-11 PROCEDURE — 1126F AMNT PAIN NOTED NONE PRSNT: CPT

## 2025-03-11 PROCEDURE — G2211 COMPLEX E/M VISIT ADD ON: HCPCS | Mod: 95

## 2025-03-11 RX ORDER — DIVALPROEX SODIUM 250 MG/1
250 TABLET, FILM COATED, EXTENDED RELEASE ORAL AT BEDTIME
Qty: 30 TABLET | Refills: 1 | Status: SHIPPED | OUTPATIENT
Start: 2025-03-11

## 2025-03-11 RX ORDER — DIVALPROEX SODIUM 500 MG/1
500 TABLET, FILM COATED, EXTENDED RELEASE ORAL AT BEDTIME
Qty: 30 TABLET | Refills: 1 | Status: SHIPPED | OUTPATIENT
Start: 2025-03-11

## 2025-03-11 RX ORDER — ATOMOXETINE 40 MG/1
40 CAPSULE ORAL DAILY
Qty: 30 CAPSULE | Refills: 1 | Status: SHIPPED | OUTPATIENT
Start: 2025-03-11

## 2025-03-11 ASSESSMENT — PAIN SCALES - GENERAL: PAINLEVEL_OUTOF10: NO PAIN (0)

## 2025-03-11 ASSESSMENT — PATIENT HEALTH QUESTIONNAIRE - PHQ9
10. IF YOU CHECKED OFF ANY PROBLEMS, HOW DIFFICULT HAVE THESE PROBLEMS MADE IT FOR YOU TO DO YOUR WORK, TAKE CARE OF THINGS AT HOME, OR GET ALONG WITH OTHER PEOPLE: EXTREMELY DIFFICULT
SUM OF ALL RESPONSES TO PHQ QUESTIONS 1-9: 7
SUM OF ALL RESPONSES TO PHQ QUESTIONS 1-9: 7

## 2025-03-11 NOTE — NURSING NOTE
Current patient location: [address] [place] [address]   [facility] [address]    Is the patient currently in the state of [place]? YES    Visit mode: VIDEO    If the visit is dropped, the patient can be reconnected by:VIDEO VISIT: Text to cell phone:   Telephone Information:   Mobile [phone]       Will anyone else be joining the visit? NO  (If patient encounters technical issues they should call [phone] :[phone])    Are changes needed to the allergy or medication list? Yes please remove meds flagged for removal:   -acetaminophen (TYLENOL) 325 MG tablet   -bisacodyl (DULCOLAX) 5 MG EC tab   -omeprazole (PRILOSEC) 20 MG DR cap   -polyethylene glycol (MIRALAX) 17 GM/Dose powder     Are refills needed on medications prescribed by this physician? Discuss with provider    Rooming Documentation:  Questionnaire(s) completed    Reason for visit: BLAINE Rivera VVF

## 2025-03-11 NOTE — PROGRESS NOTES
[facility]  MEDICAL PROGRESS NOTE  Med-Psych Team       CARE TEAM:    PCP- Zoran Steinberg  Therapist- Dr. Shahid Leon with [facility]   Neuro- Brandon Gross MD      Guzman Davidson is a 29 year old who uses the pronouns he, him, his.                   Assessment & Plan     Bipolar Disorder type 1  ADHD  Insomnia  Cannabis dependence  PTSD  Anxiety    R/o BPD vs APD     Medical Diagnoses:  Multiple Sclerosis  r/o TBI    Guzman Davidson is a 29 year old with previous psychiatric diagnoses of Bipolar Disorder type 1, ADHD, Anxiety, insomnia, BPD vs APD and medical hx of MS, who was referred by PCP for evaluation for short term collaboration of care.     Today,     Psychotropic Drug Interactions:    none  Management: N/A    [place] was [place] today: not using controlled substances    Risk Statements:   Treatment Risk: Risks, benefits, alternatives and potential adverse effects have been discussed and are understood.   Safety Risk: Guzman Davidson did not appear to be an imminent safety risk to self or others.    PLAN    1) Medications:   - Continue Depakote 750mg nightly     OTC:  -fish oil 1000mg daily  -Vitamin D 4000 units daily     MS    -medical cannabis  -Ocrevus every 6 months    2) Psychotherapy: Sees them once a week.     3) Next due:  Labs: VPA labs (level, CBC, CMP) ordered last visit. Due 08/2025. VPA level due now.    EKG: Routine monitoring is not indicated for current psychotropic medication regimen     4) Referrals: none    5) Follow-up: Return to clinic in 4 weeks                     Interval History   Since last visit,    Updates/Stressors:  - Got accepted to [employer] for a major in History. Will start this Fall.   - Increase in Depakote has been really helpful. Has noticed he still gets racing thoughts but able to recognize them and slow them down.   Still having trouble in conflict. Had a situation  recently who was a frame over a small disagreement.   - Endoscopy and colonoscopy: small polyp. Some bleeding.     Psychiatrist [facility].   - Send LALA. Shubham Lucia.     Mood: Still notices the extreme in conflict in anxiety.   Sleep: Usually 9-11:30pm and will sleep until 7:30-8:30am. Feeling less rested when he wakes up. Sleeping through the night but is leading him to   Appetite: Still has trouble with feeling hungry unless he takes marijuana. Previously has forgotten to eat a few days in a row. Will try to eat when he has food. Lack of motivation. Able to cook. Eats one high calorie meal (~1200cal) during he day and will snack throughout a day. Tries to eat 2000 calories a day minimum. 240->235. Weights going down  Medications:  - Depakote 750mg nightly   ADHD: Gets distracted pretty easily. Doesn't get as much rest. Mind is going a million miles per hour.   Therapy:  Substances:       Since last visit,    Updates/Stressors:  - Depakote increase has been  - Endoscopy    Mood:  Sleep: Similar. Feeling sluggish first 1.5 after he wakes up. Felt when he wasn't medicated, he was sleeping less but felt more energetic   Appetite:  Medications:  -    Therapy:  Substances:     Current Social History:  Financial/occupational: On Public assistance, cash benefits, social security income, SSDI. $15-25 a month for food stamps. [facility]. Vocational rehab. Wants to go back to college. Wants to go for Bachelors in [employer] and [employer]ology.   Living situation: Lives alone with apartment with Smokey the cat. He had an affair with a  woman and she has a child with uncertain paternity. Last spoke to former partner a few months ago. Will look into paternity test once mental health stabilizes.   Social/spiritual support: minimal  Background: Moved to [place] 6 years ago. Grew up in [place].       Pertinent Substance Use:  [Last updated 02/04/25]  Alcohol: Yes: rarely.   Cannabis: Yes: medical cannabis  "for MS related muscle spasms. Usually smokes every 2-3 hours. Bong. 28% marijuana. Now taking edible cannabis and flower vaporizer.   Tobacco: No longer vaping.   Caffeine:  Yes: 1-2 cup of coffee.  Energy drinks occasionally.   Opioids: No   Narcan Kit current: N/A  Other substances: No     Medical Review of Systems / Med sfx:   Blood in stool.                 Summary Points of Current Care  01/07/2025: New patient diagnostic evaluation. Started Depakote 500mg qday.   02/04/2025: Increase Depakote from 500mg to 750mg after blood draw.   03/11/2025:                 Physical Exam  (Vitals Only)    There were no vitals taken for this visit.  Pulse Readings from Last 3 Encounters:   02/18/25 60   02/06/25 61   01/07/25 59     Wt Readings from Last 3 Encounters:   02/18/25 106.6 kg (235 lb)   02/06/25 107.5 kg (237 lb)   01/07/25 110.8 kg (244 lb 4.8 oz)     BP Readings from Last 3 Encounters:   02/18/25 122/83   02/06/25 120/78   01/07/25 131/68                      Mental Status Exam***    Alertness: alert  and oriented  Appearance: well groomed  Behavior/Demeanor: cooperative, pleasant, and calm, with good  eye contact   Speech: regular rate and rhythm  Language: intact  Psychomotor: normal or unremarkable  Mood:  \"calmer\"  Affect: full range; congruent to: mood- yes, content- yes  Thought Process/Associations: unremarkable  Thought Content:  Reports none;  Denies suicidal & violent ideation and delusions  Perception:  Reports none;  Denies auditory hallucinations and visual hallucinations  Insight: excellent  Judgment: excellent  Cognition: does  appear grossly intact; formal cognitive testing was not done  Gait and Station: N/A (telehealth)                  Past Psychotropic Medication Trials  Medications Max dose Dates/Duration Discontinuation Reason Other Notes   sertraline 50 8/2022 x 6 mo Thinks it didn't work, \"killed libido\" which caused a lot of agitation     Seroquel   100    Worsened anger     gabapentin " "300 TID? 9/2022 x 1 year? Built tolerance Helpful for spasms   diazepam     Helped for anxiety, \"felt useless\" with it though     lorazepam      not helpful     Adderall     Helpful as a kid. Very stimulating as a kid     atomoxetine   [id number] Doesn't remember. Dad hated that he was on it. He does remember grades being better and when he was off of it, he struggled more with grades.      lithium 300 TID 3426-6081; 2019? For 2-3 months \"Got rebellious in high school and stopped taking it\"  Was restarted on it as an adult but wasn't consistent Helpful for mood swings    risperidone   2003/2004? X 6mo Gained a lot of weight      Topamax  150 BID    Didn't feel it helped.      Bupropion                           Past Medical History     Patient Active Problem List   Diagnosis    Anxiety    Bipolar 1 disorder (H)    Insomnia    Left foot pain    Multiple sclerosis (H)    Obesity with body mass index of 30.0-39.9    Suicidal behavior    Tailor's bunion of left foot    Tobacco use disorder    Posttraumatic stress disorder    ADHD                     Medications     Current Outpatient Medications   Medication Sig Dispense Refill    acetaminophen (TYLENOL) 325 MG tablet Take 650 mg by mouth every 6 hours as needed      bisacodyl (DULCOLAX) 5 MG EC tablet Take as directed. One day before the exam at 4 PM, take 2 Dulcolax (Bisacodyl) tablets.  At 10 PM take 2 Dulcolax (Bisacodyl) tablets. 4 tablet 0    divalproex sodium extended-release (DEPAKOTE ER) 250 MG 24 hr tablet Take 1 tablet (250 mg) by mouth at bedtime. with 500mg for a total dose of 750mg. Wait to start until after you receive call from Dr. Ochoa. 30 tablet 1    divalproex sodium extended-release (DEPAKOTE ER) 500 MG 24 hr tablet Take 1 tablet (500 mg) by mouth at bedtime. With 250mg tablet for a total of 750mg daily. 30 tablet 1    NONFORMULARY Patient states he uses medical cannibis as needed      ocrelizumab (OCREVUS) 300 MG/10ML injection Inject 600 mg into " the vein every 6 months.      Omega-3 Fish Oil 500 MG capsule Take 2 capsules (1,000 mg) by mouth daily 180 capsule 3    omeprazole (PRILOSEC) 20 MG DR capsule Take 1 capsule (20 mg) by mouth daily. 90 capsule 3    polyethylene glycol (MIRALAX) 17 GM/Dose powder Day before exam at 5 PM start drinking an 8-ounce glass of the Miralax and Gatorade mixture every 15 minutes until the pitcher is HALF empty.  Store the rest in the refrigerator. At 10 PM start drinking an 8-ounce glass of Miralax and Gatorade mixture every 15 minutes until the pitcher is empty (about 4 glasses). (Patient not taking: Reported on 2/6/2025) 238 g 0                     Data         2/4/2025     8:27 AM   PROMIS-10 Total Score w/o Sub Scores   PROMIS TOTAL - SUBSCORES 27        Proxy-reported         1/7/2025     9:08 AM   PHQ-9 SCORE   PHQ-9 Total Score MyChart 16 (Moderately severe depression)   PHQ-9 Total Score 16        Patient-reported          No data to display                Liver/Kidney Function, TSH Metabolic Blood counts   Recent Labs   Lab Test 02/05/25  1613   AST 21   ALT 15   ALKPHOS 68   CR 1.07     No lab results found. No lab results found.  No lab results found.  Recent Labs   Lab Test 02/05/25  1613   *    Recent Labs   Lab Test 02/05/25  1613   WBC 11.4*   HGB 15.8   HCT 46.2   MCV 86              ECG none on file      Psychiatry Individual Psychotherapy Note   Psychotherapy start time - ***am  Psychotherapy end time - ***am  Date treatment plan last reviewed with patient - 02/04/25  Subjective: This supportive psychotherapy session addressed issues related to goals of therapy and current psychosocial stressors. Patient's reaction: Preparatory in the context of mental status appropriate for ambulatory setting.    Interactive complexity indicated? No  Plan: RTC in timeframe noted above  Psychotherapy services during this visit included myself and the patient.   Treatment Plan      SYMPTOMS; PROBLEMS    MEASURABLE GOALS;    FUNCTIONAL IMPROVEMENT / GAINS INTERVENTIONS DISCHARGE CRITERIA   Leann/Hypomania: increased energy, decreased sleep need, increased activity, grandiosity, and dysregulation   walk away from situations that trigger strong emotions and reduce manic/hypomanic episodes Supportive / psychodynamic marked symptom improvement, symptom resolution, reduced visit frequency, and can transfer back to primary care         Level of Medical Decision Making:   - At least 1 chronic problem that is not stable  - Engaged in prescription drug management during visit (discussed any medication benefits, side effects, alternatives, etc.)    The longitudinal plan of care for the diagnosis(es)/condition(s) as documented were addressed during this visit. Due to the added complexity in care, I will continue to support Guzman Davidson in the subsequent management and with ongoing continuity of care.    PROVIDER: Annita Ochoa DO    Patient staffed in clinic with  ** who will sign the note.  Supervisor is Dr. Bernard.     RTC in timeframe noted above  Psychotherapy services during this visit included myself and the patient.   Treatment Plan      SYMPTOMS; PROBLEMS   MEASURABLE GOALS;    FUNCTIONAL IMPROVEMENT / GAINS INTERVENTIONS DISCHARGE CRITERIA   Leann/Hypomania: increased energy, decreased sleep need, increased activity, grandiosity, and dysregulation  ADHD: difficulty paying attention  and impulsive   complete tasks in timely manner, walk away from situations that trigger strong emotions, and reduce manic/hypomanic episodes Supportive / psychodynamic marked symptom improvement, symptom resolution, reduced visit frequency, and can transfer back to primary care       Level of Medical Decision Making:   - At least 1 chronic problem that is not stable  - Engaged in prescription drug management during visit (discussed any medication benefits, side effects, alternatives, etc.)    The longitudinal plan of care for the diagnosis(es)/condition(s) as documented were addressed during this visit. Due to the added complexity in care, I will continue to support Guzman Davidson in the subsequent management and with ongoing continuity of care.      PROVIDER: Annita Ochoa DO    Patient staffed in clinic with Dr. Yung who will sign the note.  Supervisor is Dr. Bernard.

## 2025-03-11 NOTE — PATIENT INSTRUCTIONS
Thank you for your visit today.      Treatment Plan Today:      1) Medications:   -No medication changes.    2) Follow-up appointment with Dr Ochoa in about 4 week     3) In the case of an emergency, crisis numbers are below and clinic after hours number is [phone].      **For crisis resources, please see the information at the end of this document**   Patient Education    Thank you for coming to the [facility] [place] [facility].     Lab Testing:  If you had lab testing today and your results are reassuring or normal they will be mailed to you or sent through [facility] within 7 days. If the lab tests need quick action we will call you with the results. The phone number we will call with results is # [phone]. If this is not the best number please call our clinic and change the number.     Medication Refills:  If you need any refills please call your pharmacy and they will contact us. Our fax number for refills is [phone].   Three business days of notice are needed for general medication refill requests.   Five business days of notice are needed for controlled substance refill requests.   If you need to change to a different pharmacy, please contact the new pharmacy directly. The new pharmacy will help you get your medications transferred.     Contact Us:  Please call [phone] during business hours (8-5:00 M-F).   If you have medication related questions after clinic hours, or on the weekend, please call [phone].     Financial Assistance [phone]   Medical Records [phone]       MENTAL HEALTH CRISIS RESOURCES:  For a emergency help, please call 911 or go to the nearest Emergency Department.     Emergency Walk-In Options:   EmPATH Unit @ [facility] ([place]): [phone] - Specialized mental health emergency area designed to be calming  [facility] ([place]): [phone]  [facility] ([place]):  [phone]  [facility] ([place]): [phone]    [facility] Crisis Information:   [place]: [phone]  [place]: [phone]  Jaciel SHAIKH) - Adult: [phone]     Child: [phone]  [place] - Adult: [phone]     Child: [phone]  [place]: [phone]  List of all [facility] resources:   https://[email or web address]/dhs/people-we-serve/adults/health-care/mental-health/resources/crisis-contacts.jsp    National Crisis Information:   Crisis Text Line: Text  [place]  to 898706  Suicide & Crisis Lifeline: 988  National Suicide Prevention Lifeline: 7-865-869-TALK (1-721.841.1466)       For online chat options, visit https://suicidepreventionlifeline.org/chat/  Poison Control Center: 1-689.931.8596  Trans Lifeline: 7-908-750-3752 - Hotline for transgender people of all ages  The [employer] Project: 4-598-075-6671 - Hotline for LGBT youth     For Non-Emergency Support:   Fast Tracker: Mental Health & Substance Use Disorder Resources -   https://www.[facility].org/

## 2025-03-11 NOTE — PROGRESS NOTES
"Virtual Visit Details    Type of service:  Video Visit   Video Start Time: {video visit start/end time for provider to select:190990}  Video End Time:{video visit start/end time for provider to select:030332}    Originating Location (pt. Location): {video visit patient location:704538::\"Home\"}  {PROVIDER LOCATION On-site should be selected for visits conducted from your clinic location or adjoining [facility] hospital, academic office, or other nearby [facility] building. Off-site should be selected for all other provider locations, including home:690557}  Distant Location (provider location):  {virtual location provider:569272}  Platform used for Video Visit: {Virtual Visit Platforms:144330::\"Tiangua Online\"}    "

## 2025-03-16 ENCOUNTER — HEALTH MAINTENANCE LETTER (OUTPATIENT)
Age: 30
End: 2025-03-16

## 2025-04-08 ENCOUNTER — VIRTUAL VISIT (OUTPATIENT)
Dept: PSYCHIATRY | Facility: CLINIC | Age: 30
End: 2025-04-08
Attending: STUDENT IN AN ORGANIZED HEALTH CARE EDUCATION/TRAINING PROGRAM
Payer: MEDICARE

## 2025-04-08 VITALS — HEIGHT: 67 IN | WEIGHT: 242 LBS | BODY MASS INDEX: 37.98 KG/M2

## 2025-04-08 DIAGNOSIS — F31.9 BIPOLAR 1 DISORDER (H): Primary | ICD-10-CM

## 2025-04-08 DIAGNOSIS — F12.20 CANNABIS DEPENDENCE (H): ICD-10-CM

## 2025-04-08 DIAGNOSIS — F43.10 POSTTRAUMATIC STRESS DISORDER: ICD-10-CM

## 2025-04-08 DIAGNOSIS — F41.9 ANXIETY: ICD-10-CM

## 2025-04-08 DIAGNOSIS — F90.9 ATTENTION DEFICIT HYPERACTIVITY DISORDER (ADHD), UNSPECIFIED ADHD TYPE: ICD-10-CM

## 2025-04-08 DIAGNOSIS — G47.00 INSOMNIA, UNSPECIFIED TYPE: ICD-10-CM

## 2025-04-08 PROCEDURE — G2211 COMPLEX E/M VISIT ADD ON: HCPCS | Mod: 95

## 2025-04-08 PROCEDURE — 98006 SYNCH AUDIO-VIDEO EST MOD 30: CPT | Mod: GC

## 2025-04-08 RX ORDER — DIVALPROEX SODIUM 500 MG/1
500 TABLET, FILM COATED, EXTENDED RELEASE ORAL AT BEDTIME
Qty: 30 TABLET | Refills: 1 | Status: SHIPPED | OUTPATIENT
Start: 2025-04-08

## 2025-04-08 RX ORDER — ATOMOXETINE 40 MG/1
40 CAPSULE ORAL DAILY
Qty: 30 CAPSULE | Refills: 1 | Status: SHIPPED | OUTPATIENT
Start: 2025-04-08

## 2025-04-08 RX ORDER — DIVALPROEX SODIUM 250 MG/1
250 TABLET, FILM COATED, EXTENDED RELEASE ORAL AT BEDTIME
Qty: 30 TABLET | Refills: 1 | Status: SHIPPED | OUTPATIENT
Start: 2025-04-08

## 2025-04-08 NOTE — NURSING NOTE
Current patient location: [address] [place] [address]   [facility] [address]    Is the patient currently in the state of [place]? YES    Visit mode: VIDEO    If the visit is dropped, the patient can be reconnected by:VIDEO VISIT: Text to cell phone:   Telephone Information:   Mobile [phone]       Will anyone else be joining the visit? NO  (If patient encounters technical issues they should call [phone] :[phone])    Are changes needed to the allergy or medication list? No    Patient denies any changes and states that all information remains accurate since last reviewed/verified.     Are refills needed on medications prescribed by this physician? Discuss with provider    Rooming Documentation:  Questionnaire(s) completed    No other vitals to report today    Reason for visit: RECHMIKE Bee MA VVF

## 2025-04-08 NOTE — PROGRESS NOTES
Virtual Visit Details    Type of service:  Video Visit   Video Start Time:  10:37am  Video End Time: 11:19am    Originating Location (pt. Location): Home    Distant Location (provider location):  On-site  Platform used for Video Visit: Maurice
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
## 2025-04-08 NOTE — PATIENT INSTRUCTIONS
Thank you for your visit today.      Treatment Plan Today:      1) Medications:   -No medication changes.  Please do lab draw at your soonest availability.     2) Follow-up appointment with Dr Ochoa in about 8 weeks.      3) In the case of an emergency, crisis numbers are below and clinic after hours number is [phone].    4) Please call your GI office for results from your endoscopy and recommendations. [phone]     **For crisis resources, please see the information at the end of this document**   Patient Education    Thank you for coming to the [facility] [place] [facility].     Lab Testing:  If you had lab testing today and your results are reassuring or normal they will be mailed to you or sent through [facility] within 7 days. If the lab tests need quick action we will call you with the results. The phone number we will call with results is # [phone]. If this is not the best number please call our clinic and change the number.     Medication Refills:  If you need any refills please call your pharmacy and they will contact us. Our fax number for refills is [phone].   Three business days of notice are needed for general medication refill requests.   Five business days of notice are needed for controlled substance refill requests.   If you need to change to a different pharmacy, please contact the new pharmacy directly. The new pharmacy will help you get your medications transferred.     Contact Us:  Please call [phone] during business hours (8-5:00 M-F).   If you have medication related questions after clinic hours, or on the weekend, please call [phone].     Financial Assistance [phone]   Medical Records [phone]       MENTAL HEALTH CRISIS RESOURCES:  For a emergency help, please call 911 or go to the nearest Emergency Department.     Emergency Walk-In Options:   [facility] Unit @ [facility] ([place]): [phone] - Specialized mental health  emergency area designed to be calming  [facility] ([place]): [phone]  [facility] ([place]): [phone]  [facility] ([place]): [phone]    [facility] Crisis Information:   [place]: [phone]  [place]: [phone]  [place] ([place]) - Adult: [phone]     Child: [phone]  [place] - Adult: [phone]     Child: [phone]  [place]: [phone]  List of all [facility] resources:   https://[place].gov/dhs/people-we-serve/adults/health-care/mental-health/resources/crisis-contacts.jsp    National Crisis Information:   Crisis Text Line: Text  [place]  to 636616  Suicide & Crisis Lifeline: 988  National Suicide Prevention Lifeline: 1-665-661-TALK (1-739.720.4168)       For online chat options, visit https://suicidepreventionlifeline.org/chat/  Poison Control Center: 1-438.547.9247  Trans Lifeline: 1-232.235.7925 - Hotline for transgender people of all ages  The [employer] Project: 8-663-339-7724 - Hotline for LGBT youth     For Non-Emergency Support:   Fast Tracker: Mental Health & Substance Use Disorder Resources -   https://www.[employer]ack[employer]EEF.comn.org/

## 2025-05-08 ENCOUNTER — TELEPHONE (OUTPATIENT)
Dept: NEUROLOGY | Facility: CLINIC | Age: 30
End: 2025-05-08
Payer: COMMERCIAL

## 2025-05-08 ENCOUNTER — OFFICE VISIT (OUTPATIENT)
Dept: NEUROLOGY | Facility: CLINIC | Age: 30
End: 2025-05-08
Attending: PSYCHIATRY & NEUROLOGY
Payer: MEDICARE

## 2025-05-08 VITALS
DIASTOLIC BLOOD PRESSURE: 74 MMHG | OXYGEN SATURATION: 95 % | SYSTOLIC BLOOD PRESSURE: 135 MMHG | BODY MASS INDEX: 36.95 KG/M2 | WEIGHT: 249.5 LBS | HEIGHT: 69 IN | HEART RATE: 93 BPM

## 2025-05-08 DIAGNOSIS — G35 MULTIPLE SCLEROSIS (H): Primary | ICD-10-CM

## 2025-05-08 DIAGNOSIS — E55.9 VITAMIN D DEFICIENCY: ICD-10-CM

## 2025-05-08 DIAGNOSIS — R52 DIFFUSE PAIN: ICD-10-CM

## 2025-05-08 PROCEDURE — G0463 HOSPITAL OUTPT CLINIC VISIT: HCPCS | Performed by: PSYCHIATRY & NEUROLOGY

## 2025-05-08 RX ORDER — ALBUTEROL SULFATE 0.83 MG/ML
2.5 SOLUTION RESPIRATORY (INHALATION)
OUTPATIENT
Start: 2025-06-23

## 2025-05-08 RX ORDER — DIPHENHYDRAMINE HYDROCHLORIDE 50 MG/ML
50 INJECTION, SOLUTION INTRAMUSCULAR; INTRAVENOUS
Start: 2025-06-23

## 2025-05-08 RX ORDER — EPINEPHRINE 1 MG/ML
0.3 INJECTION, SOLUTION, CONCENTRATE INTRAVENOUS EVERY 5 MIN PRN
OUTPATIENT
Start: 2025-06-23

## 2025-05-08 RX ORDER — DIPHENHYDRAMINE HYDROCHLORIDE 50 MG/ML
25 INJECTION, SOLUTION INTRAMUSCULAR; INTRAVENOUS
Start: 2025-06-23

## 2025-05-08 RX ORDER — HEPARIN SODIUM (PORCINE) LOCK FLUSH IV SOLN 100 UNIT/ML 100 UNIT/ML
5 SOLUTION INTRAVENOUS
OUTPATIENT
Start: 2025-06-23

## 2025-05-08 RX ORDER — ALBUTEROL SULFATE 90 UG/1
1-2 INHALANT RESPIRATORY (INHALATION)
Start: 2025-06-23

## 2025-05-08 RX ORDER — HEPARIN SODIUM,PORCINE 10 UNIT/ML
5-20 VIAL (ML) INTRAVENOUS DAILY PRN
OUTPATIENT
Start: 2025-06-23

## 2025-05-08 RX ORDER — METHYLPREDNISOLONE SODIUM SUCCINATE 125 MG/2ML
125 INJECTION INTRAMUSCULAR; INTRAVENOUS ONCE
OUTPATIENT
Start: 2025-06-23

## 2025-05-08 RX ORDER — DIPHENHYDRAMINE HCL 25 MG
50 CAPSULE ORAL ONCE
OUTPATIENT
Start: 2025-06-23

## 2025-05-08 RX ORDER — ACETAMINOPHEN 325 MG/1
650 TABLET ORAL ONCE
OUTPATIENT
Start: 2025-06-23

## 2025-05-08 RX ORDER — MEPERIDINE HYDROCHLORIDE 25 MG/ML
25 INJECTION INTRAMUSCULAR; INTRAVENOUS; SUBCUTANEOUS
OUTPATIENT
Start: 2025-06-23

## 2025-05-08 RX ORDER — METHYLPREDNISOLONE SODIUM SUCCINATE 40 MG/ML
40 INJECTION INTRAMUSCULAR; INTRAVENOUS
Start: 2025-06-23

## 2025-05-08 ASSESSMENT — PAIN SCALES - GENERAL: PAINLEVEL_OUTOF10: NO PAIN (0)

## 2025-05-08 NOTE — LETTER
5/8/2025      RE: Guzman Davidson  [address]  [facility] [address]     Referral source: Established patient    Chief complaint: Multiple sclerosis    History of the Present Illness: Mr. Guzman Davidson is a 29 year old right-handed man who presents to the Multiple Sclerosis Clinic today for a scheduled follow up visit regarding his diagnosis of multiple sclerosis.    The patient's history is as per my previous notes. Initial onset of symptoms of demyelinating disease was at age 15 when he developed double vision, gait instability, and vertigo. He was diagnosed with multiple sclerosis and initially placed on disease modifying therapy with glatiramer acetate. Compliance with this treatment was variable with barriers including insurance difficulties as well as housing instability. Per report, he had clinical relapses in the setting of noncompliance with glatiramer acetate, leading to switch to ocrelizumab in 2018. He remains on that medication up until the present time, with the most recent infusion administered on 12/23/2024.    Today, he denies any new, episodic changes in vision, balance, strength, or sensation suggestive of new relapse of multiple sclerosis since he was last seen in this clinic. 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    He has established care in the psychiatry clinic for bipolar disorder and ADHD, and is taking atomoxetine 40 mg daily and Depakote  mg at bedtime. He feels that Depakote is helping with mood stabilization but relates that he is sleeping more, for example, napping for up to 6 hours at a time. I encouraged him to schedule psychiatry follow-up as he missed his last appointment.    He is also requesting renewal of certification for the [place] Medical Cannabis program. He reports that cannabis is helpful for arthralgias/diffuse pain, which are exacerbated  by weather changes.     PHYSICAL EXAMINATION:  VITAL SIGNS: Blood pressure 135/74; pulse 93; weight 113.2 kg; height 1.76 m; weight 95%.  GENERAL: Overweight man who presents to the examination alone, awake and alert and in no acute distress.      NEUROLOGIC EXAMINATION:  CRANIAL NERVES: Visual fields are full to confrontation.  Extraocular movements are intact with no internuclear ophthalmoplegia.  Facial strength is normal.  Palate elevation and tongue protrusion are normal.  POWER: Strength is within normal limits in proximal and distal muscles in the upper and lower limbs throughout, except for finger interossei grading 4+ bilaterally.  REFLEXES: Reflexes are symmetric and within normal limits in the arms and legs.  MOTOR/CEREBELLAR: There is no appendicular ataxia on finger-to-nose testing.  Rapid alternating movements are within normal limits in the hands and fingers.  There is no pronator drift in the arms.  GAIT: The patient is able to ambulate on a flat, level surface with no gross loss of postural stability, and able to walk on heels and toes. Tandem gait is mildly impaired for age.    Assessment/plan:    1. Multiple sclerosis  The patient is clinically stable as regards any evidence of active inflammatory demyelination on current disease modifying therapy with ocrelizumab, which he will continue, with the next infusion due to be performed on or about 6/23/2025.    I would like to see him back in 6 months for a review.    2. Diffuse pain  I submitted re-certification for the [place] Medical Cannabis program for him on the state website today.    3. Vitamin D deficiency  His vitamin D level was 42 mcg/L on the date of his last ocrelizumab infusion. He relates that he has been taking 4000 units of vitamin D about 4 times per week, on average.    I encouraged him to take 4000 units daily to try to bring his level up to our goal range of 50-80 mcg/L. If he happens to forget a day, the dose can simply be  "\"made up\" the next day, as the absorption and distribution of vitamin D in the body is quite slow.    I spent a total of 23 minutes on patient care activities related to this encounter on the date of service, including time spent in reviewing the chart, obtaining history and examination from, and in counseling the patient.     The longitudinal plans of care for the diagnoses as documented were addressed during this visit. Due to the added complexity in care, I will continue to support the patient in subsequent management and with ongoing continuity of care.    Brandon Gross MD   of Neurology  [facility]    Cc:  Zoran Steinberg DO (PCP)  Annita Ochoa DO (Psychiatry)  Patient  "

## 2025-05-08 NOTE — TELEPHONE ENCOUNTER
Pt to continue Ocrevus 600 mg every 6 months, with next infusion due around June 23. Updated orders pended to Dr Gross for signature.    Angie Blunt RN

## 2025-05-08 NOTE — Clinical Note
5/8/2025       RE: Guzman Davidson  [address]  [facility] [address]     Dear Colleague,    Thank you for referring your patient, Guzman Davidson, to the [facility] MULTIPLE SCL[facility]OSIS CLINIC [place] at [facility]. Please see a copy of my visit note below.    Referral source: Established patient    Chief complaint: Multiple sclerosis    History of the Present Illness: Mr. Guzman Davidson is a 29 year old right-handed man who presents to the Multiple Sclerosis Clinic today for a scheduled follow up visit regarding his diagnosis of multiple sclerosis.    The patient's history is as per my previous notes. Initial onset of symptoms of demyelinating disease was at age 15 when he developed double vision, gait instability, and vertigo. He was diagnosed with multiple sclerosis and initially placed on disease modifying therapy with glatiramer acetate. Compliance with this treatment was variable with barriers including insurance difficulties as well as housing instability. Per report, he had clinical relapses in the setting of noncompliance with glatiramer acetate, leading to switch to ocrelizumab in 2018. He remains on that medication up until the present time, with the most recent infusion administered on 12/23/2024.    Today, he denies any new, episodic changes in vision, balance, strength, or sensation suggestive of new relapse of multiple sclerosis since he was last seen in this clinic. 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    He has established care in the psychiatry clinic for bipolar disorder and ADHD, and is taking atomoxetine 40 mg daily and Depakote  mg at bedtime. He feels that Depakote is helping with mood stabilization but relates that he is sleeping more, for example, napping for up to 6 hours at a time. I encouraged him to  schedule psychiatry follow-up as he missed his last appointment.    He is also requesting renewal of certification for the [place] [employer] program. He reports that cannabis is helpful for arthralgias/diffuse pain, which are exacerbated by weather changes.     PHYSICAL EXAMINATION:  VITAL SIGNS: Blood pressure 135/74; pulse 93; weight 113.2 kg; height 1.76 m; weight 95%.  GENERAL: Overweight man who presents to the examination alone, awake and alert and in no acute distress.    NEUROLOGIC EXAMINATION:  CRANIAL NERVES: Visual fields are full to confrontation.  Extraocular movements are intact with no internuclear ophthalmoplegia.  Facial strength is normal.  Palate elevation and tongue protrusion are normal.  POWER: Strength is within normal limits in proximal and distal muscles in the upper and lower limbs throughout, except for finger interossei grading 4+ bilaterally.  REFLEXES: Reflexes are symmetric and within normal limits in the arms and legs.  MOTOR/CEREBELLAR: There is no appendicular ataxia on finger-to-nose testing.  Rapid alternating movements are within normal limits in the hands and fingers.  There is no pronator drift in the arms.  GAIT: The patient is able to ambulate on a flat, level surface with no gross loss of postural stability, and able to walk on heels and toes. Tandem gait is mildly impaired for age.    Assessment/plan:    1. Multiple sclerosis  The patient is clinically stable as regards any evidence of active inflammatory demyelination on current disease modifying therapy with ocrelizumab, which he will continue, with the next infusion due to be performed on or about 6/23/2025.    I would like to see him back in 6 months for a review.    2. Diffuse pain  I submitted re-certification for the [place] [employer] program for him on the state website today.    3. Vitamin D deficiency  His vitamin D level was 42 mcg/L on the date of his last ocrelizumab infusion. He relates that  "he has been taking 4000 units of vitamin D about 4 times per week, on average.    I encouraged him to take 4000 units daily to try to bring his level up to our goal range of 50-80 mcg/L. If he happens to forget a day, the dose can simply be \"made up\" the next day, as the absorption and distribution of vitamin D in the body is quite slow.    I spent a total of 23 minutes on patient care activities related to this encounter on the date of service, including time spent in reviewing the chart, obtaining history and examination from, and in counseling the patient.     The longitudinal plans of care for the diagnoses as documented were addressed during this visit. Due to the added complexity in care, I will continue to support the patient in subsequent management and with ongoing continuity of care.      Again, thank you for allowing me to participate in the care of your patient.      Sincerely,    Brandon Gross MD    "

## 2025-05-08 NOTE — PATIENT INSTRUCTIONS
Proceed with next Ocrevus infusion on or about June 23, 2025    2.   Recommend that you take vitamin D 4000 units daily    3.   Return to clinic in 6 months

## 2025-05-08 NOTE — NURSING NOTE
Chief Complaint   Patient presents with    MS    RECHECK     6 month follow up      Vitals were taken and medications were reconciled.    Santos Tovar, EMT  9:57 AM

## 2025-05-12 ENCOUNTER — TELEPHONE (OUTPATIENT)
Dept: FAMILY MEDICINE | Facility: CLINIC | Age: 30
End: 2025-05-12
Payer: COMMERCIAL

## 2025-05-12 NOTE — TELEPHONE ENCOUNTER
Order/Referral Request    Who is requesting: Patient    Orders being requested: Eye Exam    Reason service is needed/diagnosis: Routine check    When are orders needed by: asap    Has this been discussed with Provider: No    Does patient have a preference on a Group/Provider/Facility? [facility]    Does patient have an appointment scheduled?: No    Where to send orders: Place orders within Epic    Could we send this information to you in [facility] or would you prefer to receive a phone call?:   Patient would prefer a phone call   Okay to leave a detailed message?: Yes at Cell number on file:    Telephone Information:   Mobile [phone]

## 2025-05-14 NOTE — PROGRESS NOTES
Referral source: Established patient    Chief complaint: Multiple sclerosis    History of the Present Illness: Mr. Guzman Davidson is a 29 year old right-handed man who presents to the Multiple Sclerosis Clinic today for a scheduled follow up visit regarding his diagnosis of multiple sclerosis.    The patient's history is as per my previous notes. Initial onset of symptoms of demyelinating disease was at age 15 when he developed double vision, gait instability, and vertigo. He was diagnosed with multiple sclerosis and initially placed on disease modifying therapy with glatiramer acetate. Compliance with this treatment was variable with barriers including insurance difficulties as well as housing instability. Per report, he had clinical relapses in the setting of noncompliance with glatiramer acetate, leading to switch to ocrelizumab in 2018. He remains on that medication up until the present time, with the most recent infusion administered on 12/23/2024.    Today, he denies any new, episodic changes in vision, balance, strength, or sensation suggestive of new relapse of multiple sclerosis since he was last seen in this clinic. 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    He has established care in the psychiatry clinic for bipolar disorder and ADHD, and is taking atomoxetine 40 mg daily and Depakote  mg at bedtime. He feels that Depakote is helping with mood stabilization but relates that he is sleeping more, for example, napping for up to 6 hours at a time. I encouraged him to schedule psychiatry follow-up as he missed his last appointment.    He is also requesting renewal of certification for the [place] [employer] program. He reports that cannabis is helpful for arthralgias/diffuse pain, which are exacerbated by weather changes.     PHYSICAL EXAMINATION:  VITAL SIGNS: Blood pressure 135/74; pulse  "93; weight 113.2 kg; height 1.76 m; weight 95%.  GENERAL: Overweight man who presents to the examination alone, awake and alert and in no acute distress.    NEUROLOGIC EXAMINATION:  CRANIAL NERVES: Visual fields are full to confrontation.  Extraocular movements are intact with no internuclear ophthalmoplegia.  Facial strength is normal.  Palate elevation and tongue protrusion are normal.  POWER: Strength is within normal limits in proximal and distal muscles in the upper and lower limbs throughout, except for finger interossei grading 4+ bilaterally.  REFLEXES: Reflexes are symmetric and within normal limits in the arms and legs.  MOTOR/CEREBELLAR: There is no appendicular ataxia on finger-to-nose testing.  Rapid alternating movements are within normal limits in the hands and fingers.  There is no pronator drift in the arms.  GAIT: The patient is able to ambulate on a flat, level surface with no gross loss of postural stability, and able to walk on heels and toes. Tandem gait is mildly impaired for age.    Assessment/plan:    1. Multiple sclerosis  The patient is clinically stable as regards any evidence of active inflammatory demyelination on current disease modifying therapy with ocrelizumab, which he will continue, with the next infusion due to be performed on or about 6/23/2025.    I would like to see him back in 6 months for a review.    2. Diffuse pain  I submitted re-certification for the [place] [employer] program for him on the state website today.    3. Vitamin D deficiency  His vitamin D level was 42 mcg/L on the date of his last ocrelizumab infusion. He relates that he has been taking 4000 units of vitamin D about 4 times per week, on average.    I encouraged him to take 4000 units daily to try to bring his level up to our goal range of 50-80 mcg/L. If he happens to forget a day, the dose can simply be \"made up\" the next day, as the absorption and distribution of vitamin D in the body is quite " slow.    I spent a total of 23 minutes on patient care activities related to this encounter on the date of service, including time spent in reviewing the chart, obtaining history and examination from, and in counseling the patient.     The longitudinal plans of care for the diagnoses as documented were addressed during this visit. Due to the added complexity in care, I will continue to support the patient in subsequent management and with ongoing continuity of care.

## 2025-05-19 ENCOUNTER — TELEPHONE (OUTPATIENT)
Dept: PSYCHIATRY | Facility: CLINIC | Age: 30
End: 2025-05-19
Payer: COMMERCIAL

## 2025-05-19 DIAGNOSIS — F31.9 BIPOLAR 1 DISORDER (H): Primary | ICD-10-CM

## 2025-05-19 NOTE — TELEPHONE ENCOUNTER
Annita Ochoa, DO to Me (Selected Message)  DJ      5/19/25 12:11 PM  Hi Sridevi,      Yes MTM would be perfect. And can you ask him to get his VPA level drawn as soon as possible? Before we can increase his Depakote we need a new level at this dose since it's likely still subtherapeutic. Also can we ask him if he felt more impulsive than usual since starting Strattera? It may be more likely we're just subtherapeutic on his Depakote but Strattera can also be triggering him too.      Thank you!     Annita Rust    Follow Up:  Writer called patient and is open to an MTM referral. He also agrees to have labs drawn ASAP. Writer reminded patient to have labs drawn approximately 12 hours after his last dose and patient verbalized understanding.     Patient does not feel that his impulsivity increased since starting Strattera. He just noticed that symptom comes back when he uses caffeine. However, he states that if he does not have caffeine, he does not feel impulsive, but he sometimes goes back to bed.     Patient also notes that he has experienced some depressive episodes as well. He states that recently he did not leave the house for about 1 week.

## 2025-05-19 NOTE — TELEPHONE ENCOUNTER
"[facility]    Phone Message    May a detailed message be left on voicemail: yes     Reason for Call: Other: Patient left a voicemail to the clinic and writer called him back. He wanted to ask Dr. Ochoa if his bipolar diagnosis was type 1 or type 2. Instances have happened where even though he is medicated, he's been feeling manic and \"can see this affecting my life and want to put a cap on it.\"       Action Taken: Other: [facility] Psychiatry Nurse Pool     Travel Screening: Not Applicable     Date of Service: 5/19/25                                                                     " Spoke with patient about his concerns.  He thought that he did not get his income tax papers back from completing patient assistance paperwork but ended up finding them at home.

## 2025-05-19 NOTE — TELEPHONE ENCOUNTER
Per last visit note:  1) Medications:   - Continue atomoxetine (Strattera) 40mg daily  - Continue Depakote 750mg nightly     Follow Up:  Writer returned call to patient. Writer clarified diagnosis in chart of Bipolar I disorder. Patient denies significant concerns for megha, but states that he has some instances where he still makes very impulsive decisions when it comes to spending money. He states that he is not spending a significant amount, but is worried what he would do if he had more money to spend.     Patient confirms he is still taking Depakote 750 mg nightly. He reports he sleeps 6-7.5 hours per night. He denies racing thoughts and states that for the first time in a long time, he is able to slow his mind down enough to recognize an issue. Patient's speech is clear and coherent with regular rate and rhythm.     Patient notes that he does drink 1-2 16 oz cups of coffee in the morning, which he does not want to stop doing as he often feels sluggish in the morning. Patient states he knows that caffeine interacts with one of his medications and is hoping to find an alternative that would allow him to continue drinking caffeine.     Patient would be open to an MTM visit prior his 6/3 appointment if provider recommends this.     Routed to provider for review.

## 2025-05-27 ENCOUNTER — TELEPHONE (OUTPATIENT)
Dept: PODIATRY | Facility: CLINIC | Age: 30
End: 2025-05-27
Payer: COMMERCIAL

## 2025-05-27 NOTE — TELEPHONE ENCOUNTER
Other: TE sent per protocol due to patient having an possible ingrown toenail with infection. He is scheduled for 05/28/25 with Dr. Cristina.     Could we [facility] this [facility] to you [facility] or would you prefer to receive a phone call?:   Patient would prefer a phone call   Okay to leave a detailed message?: Yes at Cell number on file:    Telephone Information:   Mobile [phone]

## 2025-05-28 ENCOUNTER — OFFICE VISIT (OUTPATIENT)
Dept: PODIATRY | Facility: CLINIC | Age: 30
End: 2025-05-28
Payer: MEDICARE

## 2025-05-28 VITALS — WEIGHT: 250 LBS | HEIGHT: 69 IN | BODY MASS INDEX: 37.03 KG/M2

## 2025-05-28 DIAGNOSIS — L03.031 PARONYCHIA OF GREAT TOE OF RIGHT FOOT: Primary | ICD-10-CM

## 2025-05-28 RX ORDER — LIDOCAINE HYDROCHLORIDE 20 MG/ML
5 INJECTION, SOLUTION INFILTRATION; PERINEURAL ONCE
Status: COMPLETED | OUTPATIENT
Start: 2025-05-28 | End: 2025-05-28

## 2025-05-28 RX ADMIN — LIDOCAINE HYDROCHLORIDE 5 ML: 20 INJECTION, SOLUTION INFILTRATION; PERINEURAL at 15:33

## 2025-05-28 ASSESSMENT — PAIN SCALES - GENERAL: PAINLEVEL_OUTOF10: NO PAIN (0)

## 2025-05-28 NOTE — LETTER
5/28/2025      Guzman Davidson  [address]  [facility] [address]      Dear Colleague,    Thank you for referring your patient, Guzman Davidson, to the [facility]. Please see a copy of my visit note below.    CC: Ingrown Nail, medial and lateral border, right hallux     HPI: Guzman Davidson is a 30 year old male who presents to clinic for chief concern of painful ingrown nail to the bilateral borders of the right hallux.  Patient states that he pulled out the medial and lateral borders of the right hallux nail previously and that there was wet smelly tissue.  He states that his mother would like these reevaluated to make sure that they are not regrowing and infected as the toenails turning colors and the toes turning red again.    Past Surgical History:   Procedure Laterality Date     COLONOSCOPY N/A 2/18/2025    Procedure: COLONOSCOPY, FLEXIBLE, WITH LESION REMOVAL USING SNARE;  Surgeon: Melvin Tapia MD;  Location:  GI     ESOPHAGOSCOPY, GASTROSCOPY, DUODENOSCOPY (EGD), COMBINED N/A 2/18/2025    Procedure: ESOPHAGOGASTRODUODENOSCOPY, WITH BIOPSY;  Surgeon: Melvin Tapia MD;  Location: [facility]     Past Medical History:   Diagnosis Date     Uncomplicated asthma     childhood asthma     Medications:  Current Outpatient Medications   Medication Sig Dispense Refill     atomoxetine (STRATTERA) 40 MG capsule Take 1 capsule (40 mg) by mouth daily. 30 capsule 1     divalproex sodium extended-release (DEPAKOTE ER) 250 MG 24 hr tablet Take 1 tablet (250 mg) by mouth at bedtime. with 500mg for a total dose of 750mg. 30 tablet 1     divalproex sodium extended-release (DEPAKOTE ER) 500 MG 24 hr tablet Take 1 tablet (500 mg) by mouth at bedtime. With 250mg tablet for a total of 750mg daily. 30 tablet 1     NONFORMULARY Patient states he uses medical cannibis as needed       ocrelizumab (OCREVUS) 300 MG/10ML injection Inject 600 mg into the vein every 6 months.       Omega-3  Fish Oil 500 MG capsule Take 2 capsules (1,000 mg) by mouth daily 180 capsule 3     VITAMIN D PO Take 1 tablet by mouth daily. 5000 Unit tab       omeprazole (PRILOSEC) 20 MG DR capsule Take 1 capsule (20 mg) by mouth daily. (Patient not taking: Reported on 5/28/2025) 90 capsule 3     Allergies:  Cocoa butter, Corticosteroids, Sulfa antibiotics, and Tilactase  Social History     Socioeconomic History     Marital status: Single     Spouse name: Not on file     Number of children: Not on file     Years of education: Not on file     Highest education level: Not on file   Occupational History     Not on file   Tobacco Use     Smoking status: Former     Types: Cigarettes     Smokeless tobacco: Never   Vaping Use     Vaping status: Some Days   Substance and Sexual Activity     Alcohol use: Yes     Comment: drink with dinner     Drug use: Yes     Frequency: 7.0 times per week     Types: Marijuana     Sexual activity: Not on file   Other Topics Concern     Not on file   Social History Narrative     Not on file     Social Drivers of Health     Financial Resource Strain: Medium Risk (10/23/2020)    Received from [employer]    Overall Financial Resource Strain (CARDIA)      Difficulty of Paying Living Expenses: Somewhat hard   Food Insecurity: No Food Insecurity (10/23/2020)    Received from [employer]    Hunger Vital Sign      Worried About Running Out of Food in the Last Year: Never true      Ran Out of Food in the Last Year: Never true   Transportation Needs: No Transportation Needs (10/23/2020)    Received from [employer]    PRAPARE - Transportation      Lack of Transportation (Medical): No      Lack of Transportation (Non-Medical): No   Physical Activity: Insufficiently Active (4/17/2020)    [facility] from [facility]    Exercise Vital Sign      Days of Exercise per Week: 3 days      Minutes of Exercise per Session: 20 min   Stress: Stress Concern Present (10/23/2020)     "Received from [employer]    [employer] - Occupational Stress Questionnaire      Feeling of Stress : Very much   Social Connections: Unknown (4/17/2020)    Received from [facility]    Social Connection and Isolation Panel [NHANES]      Frequency of Communication with Friends and Family: More than three times a week      Frequency of Social Gatherings with Friends and Family: Patient declined      Attends Restorationist Services: Never      Active Member of Clubs or Organizations: Patient declined      Attends Club or Organization Meetings: Patient declined      Marital Status: Living with partner   Interpersonal Safety: Not At Risk (10/23/2020)    Received from [employer]    Humiliation, Afraid, Rape, and Kick questionnaire      Fear of Current or Ex-Partner: No      Emotionally Abused: No      Physically Abused: No      Sexually Abused: No   Housing Stability: Not on file     No family history on file.    Medical records were reviewed and are summarized above.    Review of Systems    PHYSICAL EXAM:  Vital signs:Ht 1.759 m (5' 9.25\")   Wt 113.4 kg (250 lb)   BMI 36.65 kg/m       Focused lower extremity physical exam:     Derm: Skin is warm, dry, intact.  Bilateral incurvation noted to the right hallux nail.  Crusty exudate along the nail margin.  Mild hypergranular tissue noted to the nail margin.  Mild erythema present to the affected nail groove and associated digit.     Vasc: Dorsalis pedis pulses, 2/4 bilateral. Posterior tibial pulses 2/4 bilateral. Cap fill time < 3 seconds to the digits.  Mild nonpitting edema is present to right hallux. Hair growth present to the toes.     Neuro: Protective sensation intact via light touch to the feet. Gross sensation intact.     MSK:   - Tenderness palpation of the affected nail groove of the part of the hallux.  - Muscle strength 5/5 with dorsiflexion, plantarflexion, eversion, inversion of the feet. " Compartments soft and compressible.    Assessment:   - Paronychia, bilateral border right hallux    Plan:  - Patient was seen and evaluated in clinic by myself.   - Paronychia, bilateral border right hallux:  Discussed treatment options for painful incurvated border(s) including foot soaks, nail slant back, temporary versus permanent removal of the nail border versus the entire nail.  Patient would like to attempt temporary total nail removal of the right.  Discussed mechanical versus chemical matrixectomy.     Procedure -temporary total Nail Avulsion, right hallux:  After verbal/written consent was obtained the operative area was cleaned with an alcohol prep pad. 5 ml of 2% lidocaine was in digital block.  Tourniquet was placed.  After anesthesia was tested the entirety of the right hallux nail was freed both dorsally and plantarly with a elevator.  The nail was cut with an English anvil and removed with a curved hemostat.  The area was cleaned with a sterile curette and no remaining nail spicules were noted.  No phenol was applied.  Sterile saline was used for irrigation.  Tourniquet was removed.  2 x 2 and Coban was placed to the toe. Postprocedure instructions were given to the patient.    - Patient to follow-up in 2 weeks or sooner should problems arise.    Duane Hernadez DPM    Again, thank you for allowing me to participate in the care of your patient.        Sincerely,        Duane Hernadez DPM    Electronically signed

## 2025-05-28 NOTE — PROGRESS NOTES
CC: Ingrown Nail, medial and lateral border, right hallux     HPI: Guzman Davidson is a 30 year old male who presents to clinic for chief concern of painful ingrown nail to the bilateral borders of the right hallux.  Patient states that he pulled out the medial and lateral borders of the right hallux nail previously and that there was wet smelly tissue.  He states that his mother would like these reevaluated to make sure that they are not regrowing and infected as the toenails turning colors and the toes turning red again.    Past Surgical History:   Procedure Laterality Date    COLONOSCOPY N/A 2/18/2025    Procedure: COLONOSCOPY, FLEXIBLE, WITH LESION REMOVAL USING SNARE;  Surgeon: Melvin Tapia MD;  Location:  GI    ESOPHAGOSCOPY, GASTROSCOPY, DUODENOSCOPY (EGD), COMBINED N/A 2/18/2025    Procedure: ESOPHAGOGASTRODUODENOSCOPY, WITH BIOPSY;  Surgeon: Melvin Tapia MD;  Location: [facility]     Past Medical History:   Diagnosis Date    Uncomplicated asthma     childhood asthma     Medications:  Current Outpatient Medications   Medication Sig Dispense Refill    atomoxetine (STRATTERA) 40 MG capsule Take 1 capsule (40 mg) by mouth daily. 30 capsule 1    divalproex sodium extended-release (DEPAKOTE ER) 250 MG 24 hr tablet Take 1 tablet (250 mg) by mouth at bedtime. with 500mg for a total dose of 750mg. 30 tablet 1    divalproex sodium extended-release (DEPAKOTE ER) 500 MG 24 hr tablet Take 1 tablet (500 mg) by mouth at bedtime. With 250mg tablet for a total of 750mg daily. 30 tablet 1    NONFORMULARY Patient states he uses medical cannibis as needed      ocrelizumab (OCREVUS) 300 MG/10ML injection Inject 600 mg into the vein every 6 months.      Omega-3 Fish Oil 500 MG capsule Take 2 capsules (1,000 mg) by mouth daily 180 capsule 3    VITAMIN D PO Take 1 tablet by mouth daily. 5000 Unit tab      omeprazole (PRILOSEC) 20 MG DR capsule Take 1 capsule (20 mg) by mouth daily. (Patient not taking: Reported on 5/28/2025)  90 capsule 3     Allergies:  Cocoa butter, Corticosteroids, Sulfa antibiotics, and Tilactase  Social History     Socioeconomic History    Marital status: Single     Spouse name: Not on file    Number of children: Not on file    Years of education: Not on file    Highest education level: Not on file   Occupational History    Not on file   Tobacco Use    Smoking status: Former     Types: Cigarettes    Smokeless tobacco: Never   Vaping Use    Vaping status: Some Days   Substance and Sexual Activity    Alcohol use: Yes     Comment: drink with dinner    Drug use: Yes     Frequency: 7.0 times per week     Types: Marijuana    Sexual activity: Not on file   Other Topics Concern    Not on file   Social History Narrative    Not on file     Social Drivers of Health     Financial Resource Strain: Medium Risk (10/23/2020)    Received from [employer]    Overall Financial Resource Strain (CARDIA)     Difficulty of Paying Living Expenses: Somewhat hard   Food Insecurity: No Food Insecurity (10/23/2020)    Received from [employer]    Hunger Vital Sign     Worried About Running Out of Food in the Last Year: Never true     Ran Out of Food in the Last Year: Never true   Transportation Needs: No Transportation Needs (10/23/2020)    Received from [employer]    PRAPARE - Transportation     Lack of Transportation (Medical): No     Lack of Transportation (Non-Medical): No   Physical Activity: Insufficiently Active (4/17/2020)    Received from [facility]    Exercise Vital Sign     Days of Exercise per Week: 3 days     Minutes of Exercise per Session: 20 min   Stress: Stress Concern Present (10/23/2020)    Received from [employer]    [employer] [place] [employer] - Occupational Stress Questionnaire     Feeling of Stress : Very much   Social Connections: Unknown (4/17/2020)    Received from [facility]    Social Connection and Isolation Panel [NHANES]      "Frequency of Communication with Friends and Family: More than three times a week     Frequency of Social Gatherings with Friends and Family: Patient declined     Attends Religion Services: Never     Active Member of Clubs or Organizations: Patient declined     Attends Club or Organization Meetings: Patient declined     Marital Status: Living with partner   Interpersonal Safety: Not At Risk (10/23/2020)    Received from [facility]    Humiliation, Afraid, Rape, and Kick questionnaire     Fear of Current or Ex-Partner: No     Emotionally Abused: No     Physically Abused: No     Sexually Abused: No   Housing Stability: Not on file     No family history on file.    Medical records were reviewed and are summarized above.    Review of Systems    PHYSICAL EXAM:  Vital signs:Ht 1.759 m (5' 9.25\")   Wt 113.4 kg (250 lb)   BMI 36.65 kg/m       Focused lower extremity physical exam:     Derm: Skin is warm, dry, intact.  Bilateral incurvation noted to the right hallux nail.  Crusty exudate along the nail margin.  Mild hypergranular tissue noted to the nail margin.  Mild erythema present to the affected nail groove and associated digit.     Vasc: Dorsalis pedis pulses, 2/4 bilateral. Posterior tibial pulses 2/4 bilateral. Cap fill time < 3 seconds to the digits.  Mild nonpitting edema is present to right hallux. Hair growth present to the toes.     Neuro: Protective sensation intact via light touch to the feet. Gross sensation intact.     MSK:   - Tenderness palpation of the affected nail groove of the part of the hallux.  - Muscle strength 5/5 with dorsiflexion, plantarflexion, eversion, inversion of the feet. Compartments soft and compressible.    Assessment:   - Paronychia, bilateral border right hallux    Plan:  - Patient was seen and evaluated in clinic by myself.   - Paronychia, bilateral border right hallux:  Discussed treatment options for painful incurvated border(s) including foot soaks, nail slant back, " temporary versus permanent removal of the nail border versus the entire nail.  Patient would like to attempt temporary total nail removal of the right.  Discussed mechanical versus chemical matrixectomy.     Procedure -temporary total Nail Avulsion, right hallux:  After verbal/written consent was obtained the operative area was cleaned with an alcohol prep pad. 5 ml of 2% lidocaine was in digital block.  Tourniquet was placed.  After anesthesia was tested the entirety of the right hallux nail was freed both dorsally and plantarly with a elevator.  The nail was cut with an English anvil and removed with a curved hemostat.  The area was cleaned with a sterile curette and no remaining nail spicules were noted.  No phenol was applied.  Sterile saline was used for irrigation.  Tourniquet was removed.  2 x 2 and Coban was placed to the toe. Postprocedure instructions were given to the patient.    - Patient to follow-up in 2 weeks or sooner should problems arise.    Duane Hernadez DPM

## 2025-05-28 NOTE — PATIENT INSTRUCTIONS
Post Nail Excision Instructions    Today you had a temporary nail removal procedure done. Please follow the instructions for 1 week.    Keep bandages clean, dry, and intact for the rest of the day of surgery.   The day after surgery, remove all bandages.  Soak foot in warm water and Arlyn dish soap -OR- warm water with Epsom salt for 10 minutes daily until drainage stops.  Dry feet thoroughly.  Apply dry gauze to the affected area the first 2-3 days. Thereafter, you can cover with a flexible fabric style band aid instead.   General bathing is ok and can replace daily foot soaks.  Feel free to do normal activities as tolerated on the following day.   Do not apply hydrogen peroxide, antibiotic ointment, or waterproof band aids.     Things to expect:  You should not anticipate severe pain. If you do experience some discomfort, you can take Ibuprofen (Advil).   You can expect redness, local tenderness, and clear/yellow drainage from the area. This may last anywhere from several days to several weeks. This is NORMAL due to the chemical burn from the nail removal procedure.    **If you experience any increase in pain, warmth, white drainage, swelling extending into the foot, or malodor, call our office immediately as this may be a sign of infection.**    Activity:  Feel free to do normal activities as tolerated on the following day.  Wear open toe sandals if regular shoes are not comfortable.  Avoid shoes that are tight on the affected toe.    If you have any questions in the meantime, please do not hesitate to call Podiatry at [phone].

## 2025-06-03 ENCOUNTER — VIRTUAL VISIT (OUTPATIENT)
Dept: PSYCHIATRY | Facility: CLINIC | Age: 30
End: 2025-06-03
Attending: PSYCHIATRY & NEUROLOGY
Payer: MEDICARE

## 2025-06-03 DIAGNOSIS — F90.9 ATTENTION DEFICIT HYPERACTIVITY DISORDER (ADHD), UNSPECIFIED ADHD TYPE: ICD-10-CM

## 2025-06-03 DIAGNOSIS — F43.10 POSTTRAUMATIC STRESS DISORDER: ICD-10-CM

## 2025-06-03 DIAGNOSIS — F41.9 ANXIETY: ICD-10-CM

## 2025-06-03 DIAGNOSIS — F12.20 CANNABIS DEPENDENCE (H): ICD-10-CM

## 2025-06-03 DIAGNOSIS — G47.00 INSOMNIA, UNSPECIFIED TYPE: Primary | ICD-10-CM

## 2025-06-03 DIAGNOSIS — F31.9 BIPOLAR 1 DISORDER (H): ICD-10-CM

## 2025-06-03 PROCEDURE — 98006 SYNCH AUDIO-VIDEO EST MOD 30: CPT | Mod: GC

## 2025-06-03 RX ORDER — DIVALPROEX SODIUM 500 MG/1
500 TABLET, FILM COATED, EXTENDED RELEASE ORAL AT BEDTIME
Qty: 30 TABLET | Refills: 2 | Status: SHIPPED | OUTPATIENT
Start: 2025-06-03

## 2025-06-03 RX ORDER — DIVALPROEX SODIUM 250 MG/1
250 TABLET, FILM COATED, EXTENDED RELEASE ORAL AT BEDTIME
Qty: 30 TABLET | Refills: 2 | Status: SHIPPED | OUTPATIENT
Start: 2025-06-03

## 2025-06-03 ASSESSMENT — PATIENT HEALTH QUESTIONNAIRE - PHQ9
10. IF YOU CHECKED OFF ANY PROBLEMS, HOW DIFFICULT HAVE THESE PROBLEMS MADE IT FOR YOU TO DO YOUR WORK, TAKE CARE OF THINGS AT HOME, OR GET ALONG WITH OTHER PEOPLE: SOMEWHAT DIFFICULT
SUM OF ALL RESPONSES TO PHQ QUESTIONS 1-9: 11
SUM OF ALL RESPONSES TO PHQ QUESTIONS 1-9: 11

## 2025-06-03 ASSESSMENT — PAIN SCALES - GENERAL: PAINLEVEL_OUTOF10: NO PAIN (0)

## 2025-06-03 NOTE — PROGRESS NOTES
Virtual Visit Details    Type of service:  Video Visit   Video Start Time: 8:46AM  Video End Time:9:21AM    Originating Location (pt. Location): Home    Distant Location (provider location):  On-site  Platform used for Video Visit: Maurice

## 2025-06-03 NOTE — NURSING NOTE
Is the patient currently in the state of [place]? YES    Current patient location: [facility] [facility]   [facility] [address]    Visit mode:Video    If the visit is dropped, the patient can be reconnected by: VIDEO VISIT:  Send e-mail to at [email or web address]    Will anyone else be joining the visit? No  (If patient encounters technical issues they should call [phone])    Are changes needed to the allergy or medication list? No    Are refills needed on medications prescribed by this physician? No    Rooming Documentation: Questionnaire(s) completed.    Reason for visit: RECHYVON Salazar

## 2025-06-03 NOTE — PROGRESS NOTES
"   [facility]  MEDICAL PROGRESS NOTE  Med-Psych Team       CARE TEAM:    PCP- Zoran Steinberg  Therapist- Dr. Shahid Leon with [facility]   Neuro- Brandon Gross MD    Guzman Davidson is a 30 year old who uses the pronouns he, him, his.                   Assessment & Plan     Bipolar Disorder type 1  ADHD, unspecified  Insomnia, unspecified  Cannabis dependence  PTSD  Anxiety unspecified    R/o BPD vs APD     Medical Diagnoses:  Multiple Sclerosis  r/o TBI    Guzman \"Alexis\" Stuart is a 30 year old with previous psychiatric diagnoses of Bipolar Disorder type 1, ADHD, Anxiety, insomnia, BPD vs APD and medical hx of MS, who was referred by PCP for evaluation for short term collaboration of care. He presents today for follow-up.     Today, Guzman presents today as euthymic. He finds that taking Strattera with his caffeine makes him too jittery so he wants to try coming off of it for now since he wants to continue drinking his coffee when he starts school this fall for energy and focus. We reviewed trialing other medications for ADHD but he declined for now. We reviewed that we would like to get an updated VPA level to see if he's in the therapeutic range of his Depakote. He does identify some uncontrollable increased energy throughout the day when taking Strattera with caffeine likely due to additive sympathomimetic mechanism. It doesn't seem consistent with a true elevated mood state though he'll classify this as being \"manic\". We reviewed future consideration to go higher on the dose though he declined because he accidentally took double the dose and had an acidic feeling in his stomach. We reviewed that we would be making a smaller dose adjustment and that he likely just didn't tolerate that big of a jump in his dose but he remained firm. Can explore this with him at his next appointment once we get an updated level. We " reviewed the resident transition and he agreed to stay in the resident clinic. Reviewed how to contact the clinic for urgent needs and provided crisis resources in the AVS. He denied safety concerns today.     Future considerations (for ADHD):  - Trial alpha-2-agonists (Intuniv, clonidine). Please note, Intuniv does affect the concentration levels of Depakote via unknown mechanism so this should be monitored.  - Modafinil   - Methylphenidate (risk less pronounced compared to amphetamine based stimulants)    Psychotropic Drug Interactions: N/A  Management: N/A    [place]: not using controlled substances    Risk Statements:   Treatment Risk: Risks, benefits, alternatives and potential adverse effects have been discussed and are understood.   Safety Risk: Guzman Davidson did not appear to be an imminent safety risk to self or others.    PLAN    1) Medications:   - Discontinue atomoxetine (Strattera) 40mg daily  - Continue Depakote 750mg nightly     OTC:  -fish oil 1000mg daily  -Vitamin D 4000 units daily     MS    -medical cannabis  -Ocrevus every 6 months    2) Psychotherapy: Continue seeing them once a week.     3) Next due:  Labs: VPA labs (level, CBC, CMP) [id number]. CBC, CMP due 08/2025. VPA level due now due to recent dose adjustment. Ordered last visit. Reminded to complete.   EKG: Routine monitoring is not indicated for current psychotropic medication regimen     4) Referrals: none    5) Disposition: Return to clinic with next psychiatry resident. After a period of stability, discussion about where to discharge to next for long-term management of his medications.                     Interval History   Since last visit,    Updates/Stressors:  - Double booked with MTM today. He agreed to cancel appt with MTM later today.   - Wants to discontinue Atomoxetine because it interacts with his caffeine and makes him more jittery.   - VPA level not done yet.     Mood: Feels Depakote is helpful at stabilizing  his mood.   Sleep: Really good.   Appetite: Still smokes marijuana to get an appetite. Weight stable around 250lbs.   Medications:  Adherent with Strattera and Depakote  When he takes Strattera with his coffee, he becomes very agitated and restless. Energy levels become less controllable throughout the day. When he waits an hour after drinking coffee then doesn't experiencing it as much. Wants to come off of Strattera.   Hard for him to slow things down and think and talk prior to Depakote. This has improved.   Doesn't want to go higher because of acidic feeling in his gut he has when he accidentally taken double the dose. Finds it's having a therapeutic effect.     Therapy: Seeing them once a week.   SI/SIB/HI: Denies    Current Social History:  Financial/occupational: On Public assistance, cash benefits, social security income, SSDI. $15-25 a month for food stamps. [facility]. Vocational rehab. Wants to go back to college. Wants to go for Bachelors in [employer] and [employer]ology. Planning to start Fall 2025.   Living situation: Lives alone with apartment with Valente the cat. He had an affair with a  woman and she has a child with uncertain paternity. Last spoke to former partner a earlier this year. Will look into paternity test once mental health stabilizes.   Social/spiritual support: minimal  Background: Moved to [place] 6 years ago. Grew up in [place].       Pertinent Substance Use:  [Last updated 06/3/25]  Alcohol: Yes: rarely. More for cooking.   Cannabis: Yes: medical cannabis for MS related muscle spasms. Usually smokes every 2-3 hours. Bong. 28% marijuana. Now taking edible cannabis and flower vaporizer.   Tobacco: No longer vaping.   Caffeine:  Yes: 4-5 cup of coffee.  No longer drinking energy drinks..Will cut down on coffee.   Opioids: No   Narcan Kit current: N/A  Other substances: No     Medical Review of Systems / Med sfx:   MSK: Low back pain   Derm: Paronychia of great toe on right foot. Saw  "podiatry                Summary Points of Current Care  01/07/2025: New patient diagnostic evaluation. Started Depakote 500mg qday.   02/04/2025: Increase Depakote from 500mg to 750mg after blood draw.   03/11/2025: Start atomoxetine 40mg daily  04/08/2025: No medication changes.   06/3/2025: Discontinue atomoxetine 40mg daily.                 Physical Exam  (Vitals Only)    There were no vitals taken for this visit.  Pulse Readings from Last 3 Encounters:   05/08/25 93   02/18/25 60   02/06/25 61     Wt Readings from Last 3 Encounters:   05/28/25 113.4 kg (250 lb)   05/08/25 113.2 kg (249 lb 8 oz)   04/08/25 109.8 kg (242 lb)     BP Readings from Last 3 Encounters:   05/08/25 135/74   02/18/25 122/83   02/06/25 120/78                      Mental Status Exam    Alertness: alert  and oriented  Appearance: well groomed  Behavior/Demeanor: cooperative, pleasant, and calm, with good  eye contact   Speech: regular rate and rhythm  Language: intact  Psychomotor: normal or unremarkable  Mood: \"some mood elevation with Strattera and caffeine\"  Affect: full range; congruent to: mood- yes, content- yes  Thought Process/Associations: unremarkable  Thought Content:  Reports none;  Denies suicidal & violent ideation and delusions  Perception:  Reports none;  Denies auditory hallucinations and visual hallucinations  Insight: excellent  Judgment: excellent  Cognition: does  appear grossly intact; formal cognitive testing was not done  Gait and Station: N/A (telehealth)                  Past Psychotropic Medication Trials  Medications Max dose Dates/Duration Discontinuation Reason Other Notes   sertraline 50 8/2022 x 6 mo Didn't work, \"killed libido\" -> agitation     Seroquel   100    Worsened anger     gabapentin 300 TID? 9/2022 x 1 yr? Built tolerance Helpful for spasms   diazepam     Helped for anxiety, \"felt useless\" with it though     lorazepam      not helpful     Adderall     Helpful as a kid. Very stimulating as a kid   " "  atomoxetine 40  [id number]; 3/2025-6/2025 Doesn't remember. Dad hated that he was on it  Trailed again in 2025. Was helpful but made him more jittery/restless with caffeine and wants to continue drinking coffee.  better grades on med   lithium 300 TID '03-'10;2019? ~2-3 mo \"Got rebellious in high school and stopped taking\"  Took again an adult but wasn't consistent Helpful for mood swings    risperidone   2003-04? x6mo Gained a lot of weight      Topamax  150 BID    Didn't feel it helped.      Bupropion                           Past Medical History     Patient Active Problem List   Diagnosis    Anxiety    Bipolar 1 disorder (H)    Insomnia    Left foot pain    Multiple sclerosis (H)    Obesity with body mass index of 30.0-39.9    Suicidal behavior    Tailor's bunion of left foot    Tobacco use disorder    Posttraumatic stress disorder    Attention deficit hyperactivity disorder (ADHD)                     Medications     Current Outpatient Medications   Medication Sig Dispense Refill    atomoxetine (STRATTERA) 40 MG capsule Take 1 capsule (40 mg) by mouth daily. 30 capsule 1    divalproex sodium extended-release (DEPAKOTE ER) 250 MG 24 hr tablet Take 1 tablet (250 mg) by mouth at bedtime. with 500mg for a total dose of 750mg. 30 tablet 1    divalproex sodium extended-release (DEPAKOTE ER) 500 MG 24 hr tablet Take 1 tablet (500 mg) by mouth at bedtime. With 250mg tablet for a total of 750mg daily. 30 tablet 1    NONFORMULARY Patient states he uses medical cannibis as needed      ocrelizumab (OCREVUS) 300 MG/10ML injection Inject 600 mg into the vein every 6 months.      Omega-3 Fish Oil 500 MG capsule Take 2 capsules (1,000 mg) by mouth daily 180 capsule 3    omeprazole (PRILOSEC) 20 MG DR capsule Take 1 capsule (20 mg) by mouth daily. (Patient not taking: Reported on 5/28/2025) 90 capsule 3    VITAMIN D PO Take 1 tablet by mouth daily. 5000 Unit tab                       Data         2/4/2025     8:27 AM "   PROMIS-10 Total Score w/o Sub Scores   PROMIS TOTAL - SUBSCORES 27        Proxy-reported         1/7/2025     9:08 AM 3/11/2025     9:55 AM   PHQ-9 SCORE   PHQ-9 Total Score MyChart 16 (Moderately severe depression) 7 (Mild depression)   PHQ-9 Total Score 16  7        Patient-reported    Proxy-reported          No data to display                Liver/Kidney Function, TSH Metabolic Blood counts   Recent Labs   Lab Test 02/05/25  1613   AST 21   ALT 15   ALKPHOS 68   CR 1.07     No lab results found. No lab results found.  No lab results found.  Recent Labs   Lab Test 02/05/25  1613   *    Recent Labs   Lab Test 02/05/25  1613   WBC 11.4*   HGB 15.8   HCT 46.2   MCV 86            Level of Medical Decision Making:   - At least 1 chronic problem that is not stable  - Engaged in prescription drug management during visit (discussed any medication benefits, side effects, alternatives, etc.)    PROVIDER: Annita Ochoa DO    Patient staffed in clinic with Dr. Bernard who will sign the note.  Supervisor is Dr. Bernard.

## 2025-06-03 NOTE — PATIENT INSTRUCTIONS
Thank you for your visit today.      Treatment Plan Today:      1) Medications:   -Discontinue Atomoxetine 40mg daily.   -Please do VPA level about 12 hours after the last dose.     2) Follow-up appointment with Lizbeth Spaulding PharmD in 1 month     3) In the case of an emergency, crisis numbers are below and clinic after hours number is [phone].    **For crisis resources, please see the information at the end of this document**   Patient Education    Thank you for coming to the [facility] [place] [facility].     Lab Testing:  If you had lab testing today and your results are reassuring or normal they will be mailed to you or sent through [employer] within 7 days. If the lab tests need quick action we will call you with the results. The phone number we will call with results is # [phone]. If this is not the best number please call our clinic and change the number.     Medication Refills:  If you need any refills please call your pharmacy and they will contact us. Our fax number for refills is [phone].   Three business days of notice are needed for general medication refill requests.   Five business days of notice are needed for controlled substance refill requests.   If you need to change to a different pharmacy, please contact the new pharmacy directly. The new pharmacy will help you get your medications transferred.     Contact Us:  Please call [phone] during business hours (8-5:00 M-F).   If you have medication related questions after clinic hours, or on the weekend, please call [phone].     Financial Assistance [phone]   Medical Records [phone]       MENTAL HEALTH CRISIS RESOURCES:  For a emergency help, please call 911 or go to the nearest Emergency Department.     Emergency Walk-In Options:   Em[facility] Unit @ [facility] ([place]): [phone] - Specialized mental health emergency area designed to be calming  [facility]  ([place]): [phone]  [facility] ([place]): [phone]  [facility] ([place]): [phone]    [facility] Crisis Information:   [place]: [phone]  [place]: [phone]  [place] ([place]) - Adult: [phone]     Child: [phone]  [place] - Adult: [phone]     Child: [phone]  [place]: [phone]  List of all [facility] resources:   [email or web address]/people-[email or web address]-care/mental-health/resources/crisis-contacts.jsp    National Crisis Information:   Crisis Text Line: Text  [place]  to 537693  Suicide & Crisis Lifeline: 988  National Suicide Prevention Lifeline: 2-529-267-TALK (1-251.384.1791)       For online chat options, visit https://suicidepreventionlifeline.org/chat/  Poison Control Center: 3-110-646-9560  Trans Lifeline: 2-652-935-3714 - Hotline for transgender people of all ages  The [employer] Project: 9-187-321-1954 - Hotline for LGBT youth     For Non-Emergency Support:   Fast Tracker: Mental Health & Substance Use Disorder Resources -   https://www.MashMe.TVn.org/

## 2025-06-05 ENCOUNTER — NURSE TRIAGE (OUTPATIENT)
Dept: NURSING | Facility: CLINIC | Age: 30
End: 2025-06-05
Payer: COMMERCIAL

## 2025-06-05 NOTE — TELEPHONE ENCOUNTER
"  Nurse Triage SBAR    Is this a 2nd Level Triage? YES, LICENSED PRACTITIONER REVIEW IS REQUIRED    Situation: feels like something in right eye,  eye pain with white goop in the am in the corner of the eye    In corner of eye on the ear side    Background:   Assessment:   Right eye pain, feels like something is in there.  In the corner of the eye, on the ear side.  Denies fever or chills  Denies blurry or double vision  White glob in that corner of the eye  Denies headache  Denies N/V  Denies SOB or chest pain  Patient has MS and so falls a lot, but denies hitting his head.  \"The thing about people that fall a lot is, you get really good at falling\"  Does not work with metal or wood  Patient states that his cat is really good at knocking glass items off the counter, he is wondering if it could be something like that?   I  do have a history of losing eye lashes, could be that  Nothing OTC such as gtts taken    Protocol Recommended Disposition:   Routing to EYE for a call back    Recommendation:     Routed to provider      Reason for Disposition   Eye pain present > 24 hours    Answer Assessment - Initial Assessment Questions  1. ONSET: \"When did the pain start?\" (e.g., minutes, hours, days)      Right eye pain, feels like something is in there.  In the corner of the eye, on the ear side.  2. TIMING: \"Does the pain come and go, or has it been constant since it started?\" (e.g., constant, intermittent, fleeting)      Comes and goes  3. SEVERITY: \"How bad is the pain?\"   (Scale 1-10; mild, moderate or severe)    - MILD (1-3): doesn't interfere with normal activities     - MODERATE (4-7): interferes with normal activities or awakens from sleep     - SEVERE (8-10): excruciating pain and patient unable to do normal activities      1/10      4. LOCATION: \"Where does it hurt?\"  (e.g., eyelid, eye, cheekbone)      In corner of eye on the ear side  5. CAUSE: \"What do you think is causing the pain?\"      I  do have a history of " "losing eye lashes, could be that  6. VISION: \"Do you have blurred vision or changes in your vision?\"       Denies blurry or double vision  7. EYE DISCHARGE: \"Is there any discharge (pus) from the eye(s)?\"  If yes, ask: \"What color is it?\"       White glob in that corner of the eye  8. FEVER: \"Do you have a fever?\" If so, ask: \"What is it, how was it measured, and when did it start?\"       denies  9. OTHER SYMPTOMS: \"Do you have any other symptoms?\" (e.g., headache, nasal discharge, facial rash)      Denies headache  Denies N/V  Denies SOB or chest pain  Patient has MS and so falls a lot, but denies hitting his head.  \"The thing about people that fall a lot is, you get really good at falling\"  Does not work with metal or wood  Patient states that his cat is really good at knocking glass items off the counter, he is wondering if it could be something like that?  10. PREGNANCY: \"Is there any chance you are pregnant?\" \"When was your last menstrual period?\"        N/a    Protocols used: Eye [facility]    "

## 2025-06-05 NOTE — TELEPHONE ENCOUNTER
[phone] home/mobile at [phone]    Left message with direct number at [phone]    Diaz Mckenzie RN 8:48 AM 06/05/25

## 2025-06-05 NOTE — TELEPHONE ENCOUNTER
Right eye pain in AM and before bed times one week.    No redness    No vision changes.    Offered appt this afternoon and pt unable    Scheduled Saturday at [facility] location and confirmed appt with pt.    Reviewed ok for Refresh or Systane lubricating eye drop til visit (reviewed to stay away from drops that advertise getting the red out).    Diaz Mckenzie RN 9:13 AM 06/05/25

## 2025-06-26 ENCOUNTER — INFUSION THERAPY VISIT (OUTPATIENT)
Dept: INFUSION THERAPY | Facility: CLINIC | Age: 30
End: 2025-06-26
Attending: PSYCHIATRY & NEUROLOGY
Payer: MEDICARE

## 2025-06-26 VITALS
BODY MASS INDEX: 34.97 KG/M2 | OXYGEN SATURATION: 97 % | SYSTOLIC BLOOD PRESSURE: 117 MMHG | RESPIRATION RATE: 16 BRPM | TEMPERATURE: 98.6 F | WEIGHT: 238.5 LBS | HEART RATE: 84 BPM | DIASTOLIC BLOOD PRESSURE: 73 MMHG

## 2025-06-26 DIAGNOSIS — Z79.899 ENCOUNTER FOR LONG-TERM (CURRENT) USE OF MEDICATIONS: ICD-10-CM

## 2025-06-26 DIAGNOSIS — G35 MULTIPLE SCLEROSIS (H): Primary | ICD-10-CM

## 2025-06-26 LAB — VALPROATE SERPL-MCNC: 44.8 UG/ML (ref 50–100)

## 2025-06-26 PROCEDURE — 250N000011 HC RX IP 250 OP 636: Mod: JZ | Performed by: PSYCHIATRY & NEUROLOGY

## 2025-06-26 PROCEDURE — 250N000013 HC RX MED GY IP 250 OP 250 PS 637: Performed by: PSYCHIATRY & NEUROLOGY

## 2025-06-26 PROCEDURE — 258N000003 HC RX IP 258 OP 636: Performed by: PSYCHIATRY & NEUROLOGY

## 2025-06-26 PROCEDURE — 80164 ASSAY DIPROPYLACETIC ACD TOT: CPT

## 2025-06-26 PROCEDURE — 36415 COLL VENOUS BLD VENIPUNCTURE: CPT

## 2025-06-26 RX ORDER — DIPHENHYDRAMINE HYDROCHLORIDE 50 MG/ML
50 INJECTION, SOLUTION INTRAMUSCULAR; INTRAVENOUS
Start: 2025-12-23

## 2025-06-26 RX ORDER — HEPARIN SODIUM,PORCINE 10 UNIT/ML
5-20 VIAL (ML) INTRAVENOUS DAILY PRN
OUTPATIENT
Start: 2025-12-23

## 2025-06-26 RX ORDER — DIPHENHYDRAMINE HCL 25 MG
50 CAPSULE ORAL ONCE
Status: COMPLETED | OUTPATIENT
Start: 2025-06-26 | End: 2025-06-26

## 2025-06-26 RX ORDER — METHYLPREDNISOLONE SODIUM SUCCINATE 125 MG/2ML
125 INJECTION INTRAMUSCULAR; INTRAVENOUS ONCE
Status: COMPLETED | OUTPATIENT
Start: 2025-06-26 | End: 2025-06-26

## 2025-06-26 RX ORDER — DIPHENHYDRAMINE HCL 25 MG
50 CAPSULE ORAL ONCE
OUTPATIENT
Start: 2025-12-23

## 2025-06-26 RX ORDER — EPINEPHRINE 1 MG/ML
0.3 INJECTION, SOLUTION INTRAMUSCULAR; SUBCUTANEOUS EVERY 5 MIN PRN
OUTPATIENT
Start: 2025-12-23

## 2025-06-26 RX ORDER — ACETAMINOPHEN 325 MG/1
650 TABLET ORAL ONCE
OUTPATIENT
Start: 2025-12-23

## 2025-06-26 RX ORDER — MEPERIDINE HYDROCHLORIDE 25 MG/ML
25 INJECTION INTRAMUSCULAR; INTRAVENOUS; SUBCUTANEOUS
OUTPATIENT
Start: 2025-12-23

## 2025-06-26 RX ORDER — HEPARIN SODIUM (PORCINE) LOCK FLUSH IV SOLN 100 UNIT/ML 100 UNIT/ML
5 SOLUTION INTRAVENOUS
OUTPATIENT
Start: 2025-12-23

## 2025-06-26 RX ORDER — ACETAMINOPHEN 325 MG/1
650 TABLET ORAL ONCE
Status: COMPLETED | OUTPATIENT
Start: 2025-06-26 | End: 2025-06-26

## 2025-06-26 RX ORDER — ALBUTEROL SULFATE 0.83 MG/ML
2.5 SOLUTION RESPIRATORY (INHALATION)
OUTPATIENT
Start: 2025-12-23

## 2025-06-26 RX ORDER — METHYLPREDNISOLONE SODIUM SUCCINATE 125 MG/2ML
125 INJECTION INTRAMUSCULAR; INTRAVENOUS ONCE
OUTPATIENT
Start: 2025-12-23

## 2025-06-26 RX ORDER — DIPHENHYDRAMINE HYDROCHLORIDE 50 MG/ML
25 INJECTION, SOLUTION INTRAMUSCULAR; INTRAVENOUS
Start: 2025-12-23

## 2025-06-26 RX ORDER — METHYLPREDNISOLONE SODIUM SUCCINATE 40 MG/ML
40 INJECTION INTRAMUSCULAR; INTRAVENOUS
Start: 2025-12-23

## 2025-06-26 RX ORDER — ALBUTEROL SULFATE 90 UG/1
1-2 INHALANT RESPIRATORY (INHALATION)
Start: 2025-12-23

## 2025-06-26 RX ADMIN — ACETAMINOPHEN 650 MG: 325 TABLET ORAL at 13:25

## 2025-06-26 RX ADMIN — METHYLPREDNISOLONE SODIUM SUCCINATE 125 MG: 125 INJECTION, POWDER, FOR SOLUTION INTRAMUSCULAR; INTRAVENOUS at 13:27

## 2025-06-26 RX ADMIN — DIPHENHYDRAMINE HYDROCHLORIDE 50 MG: 25 CAPSULE ORAL at 13:25

## 2025-06-26 RX ADMIN — SODIUM CHLORIDE 600 MG: 0.9 INJECTION, SOLUTION INTRAVENOUS at 13:47

## 2025-06-30 ENCOUNTER — RESULTS FOLLOW-UP (OUTPATIENT)
Dept: PSYCHIATRY | Facility: CLINIC | Age: 30
End: 2025-06-30

## 2025-07-07 ENCOUNTER — VIRTUAL VISIT (OUTPATIENT)
Dept: PHARMACY | Facility: CLINIC | Age: 30
End: 2025-07-07
Payer: COMMERCIAL

## 2025-07-07 DIAGNOSIS — F31.9 BIPOLAR 1 DISORDER (H): Primary | ICD-10-CM

## 2025-07-07 DIAGNOSIS — F41.1 GAD (GENERALIZED ANXIETY DISORDER): ICD-10-CM

## 2025-07-07 DIAGNOSIS — G35 MULTIPLE SCLEROSIS (H): ICD-10-CM

## 2025-07-07 PROCEDURE — 99207 PR NO CHARGE LOS: CPT | Mod: 93 | Performed by: PHARMACIST

## 2025-07-07 NOTE — Clinical Note
Fyi; met with this patient to review meds per psychiatry referral. Lizbeth Spaulding, PharmD Medication Therapy Management Pharmacist [facility]

## 2025-07-07 NOTE — PROGRESS NOTES
Medication Therapy Management (MTM) Encounter    ASSESSMENT:                            Medication Adherence/Access: See below for considerations.    Mental Health  Depakote level potentially in therapeutic range given slightly subtherapeutic level was taken around 40 hours rather than 24 hour trough.  If anxiety worsens or seems intolerable, it would be reasonable to increase his Depakote dose. Will be following up with psychiatry and patient is comfortable maintaining current regimen until then.    MS  Would be best to standardize how much and how often Vitamin D is taken so lab levels are accurate. Doses between 2212-8351 international unit(s) by mouth once daily should be therapeutic.     PLAN:                            Start taking vitamin D at consistent dose and timeframe. Recommend 4000 - 6000 international unit(s) by mouth once daily. May help to take at same time as Depakote for ease of administration.  Continue Depakote at current dose  Reach out if anxiety is worsening or intolerable to see about increasing Depakote dose.     Follow-up:  Will be seeing Psychiatry within the next couple months.      SUBJECTIVE/OBJECTIVE:                          Alexis Davidson is a 30 year old male seen for an initial visit. He was referred to me from Annita Ochoa DO.      Reason for visit: med check    Allergies/ADRs: Reviewed in chart  Past Medical History: Reviewed in chart  Tobacco: He reports that he has quit smoking. His smoking use included cigarettes. He has never used smokeless tobacco.  THC: Smoking/vaping marijuana every day in the morning and then a little later in the day for back pain. Seems to be helpful.    Medication Adherence/Access: inconsistent vitamin D dosing, missed dose of Depakote the night before getting last labs, no other issues found.    Mental Health   Anxiety, Depression, Bipolar, Insomnia, and PTSD, ADHD  Divalproex  mg taken by mouth once nightly at bedtime.    VPA level 6/26/25 at  "1300 was 44.8 mcg/mL = below therapeutic range of  mcg/mL but level was > 24 hours and past regular trough. States he rarely misses a dose, but had forgotten to take it the night before this level    At last psychiatry visit on 6/3/25, patient elected to stop taking atomoxetine due to feeling too jittery when also drinking coffee and feeling like he was \"losing his personality.\"   Jitteriness since improved and is starting to feel more like himself. Anxiety is persisting but not worse than before, notices it most in social situations, but feels manageable and is able to complete daily activities without much difficulty. Overall feels that mood is improved and more stable in the last month.     MS   Vitamin D 5000 international unit(s) by mouth once daily  Ocrelizumab 600 mg IV infusion once every 6 months  THC vape/smoking about twice daily for back pain and inflammation.--helpful    Reports taking 5-10 gummies of Vitamin D3 2000 international unit(s) taken by mouth about once weekly in order to get enough of a dose in case he forgets daily use. Has no concerns with ocrelizumab and continues with regular follow up.    ----------------    I spent 30 minutes with this patient today.    A summary of these recommendations via clinic portal.    Lashae Powers, pharmacy student    Lizbeth Spaulding, PharmD  Medication Therapy Management Pharmacist  [facility]    Telemedicine Visit Details  The patient's medications can be safely assessed via a telemedicine encounter.  Type of service:  Telephone visit  Originating Location (pt. Location): Home  Distant Location (provider location):  On-site  Start Time: 8:30 AM  End Time: 9:00 AM     Medication Therapy Recommendations  Multiple sclerosis (H)   1 Current Medication: VITAMIN D PO (Discontinued)   Current Medication Sig: Take 1 tablet by mouth daily. 5000 Unit tab   Rationale: Does not understand instructions - Adherence - Adherence "   Recommendation: Provide Education - VITAMIN D3 PO   Status: Patient Agreed - Adherence/Education   Identified Date: 7/7/2025 Completed Date: 7/7/2025

## 2025-07-07 NOTE — PATIENT INSTRUCTIONS
"Recommendations from today's MTM visit:                                                    MTM (medication therapy management) is a service provided by a clinical pharmacist designed to help you get the most of out of your medicines.   Today we reviewed what your medicines are for, how to know if they are working, that your medicines are safe and how to make your medicine regimen as easy as possible.        Start taking vitamin D at consistent dose and timeframe. Recommend 4000 - 6000 international unit(s) by mouth once daily. May help to take at same time as Depakote for ease of administration.  Continue Depakote at current dose  Reach out if anxiety is worsening or intolerable to see about increasing Depakote dose.     Follow-up:  Will be seeing Psychiatry within the next couple months.      It was great speaking with you today.  I value your experience and would be very thankful for your time in providing feedback in our clinic survey. In the next few days, you may receive an email or text message from [employer] with a link to a survey related to your  clinical pharmacist.\"     To schedule another MTM appointment, please call the clinic directly or you may call the MTM scheduling line at [phone] or toll-free at [phone].     My Clinical Pharmacist's contact information:                                                      Please feel free to contact me with any questions or concerns you have.      Lizbeth Spaulding, PharmD  Medication Therapy Management Pharmacist  [facility]    "